# Patient Record
Sex: MALE | Race: BLACK OR AFRICAN AMERICAN | NOT HISPANIC OR LATINO | Employment: FULL TIME | URBAN - METROPOLITAN AREA
[De-identification: names, ages, dates, MRNs, and addresses within clinical notes are randomized per-mention and may not be internally consistent; named-entity substitution may affect disease eponyms.]

---

## 2020-05-21 DIAGNOSIS — E87.6 HYPOKALEMIA: Primary | ICD-10-CM

## 2020-05-21 DIAGNOSIS — E87.6 HYPOKALEMIA: ICD-10-CM

## 2020-05-21 RX ORDER — POTASSIUM CHLORIDE 1.5 G/1.77G
20 POWDER, FOR SOLUTION ORAL 2 TIMES DAILY
Qty: 60 TABLET | Refills: 1 | Status: SHIPPED | OUTPATIENT
Start: 2020-05-21 | End: 2020-05-21

## 2020-05-21 RX ORDER — POTASSIUM CHLORIDE 1500 MG/1
20 TABLET, FILM COATED, EXTENDED RELEASE ORAL 2 TIMES DAILY
Qty: 60 TABLET | Refills: 3 | Status: SHIPPED | OUTPATIENT
Start: 2020-05-21 | End: 2020-10-12

## 2020-10-10 DIAGNOSIS — E87.6 HYPOKALEMIA: ICD-10-CM

## 2020-10-12 RX ORDER — POTASSIUM CHLORIDE 1500 MG/1
20 TABLET, FILM COATED, EXTENDED RELEASE ORAL 2 TIMES DAILY
Qty: 60 TABLET | Refills: 3 | Status: SHIPPED | OUTPATIENT
Start: 2020-10-12 | End: 2021-04-20 | Stop reason: SDUPTHER

## 2020-10-16 ENCOUNTER — TELEPHONE (OUTPATIENT)
Dept: OTHER | Facility: OTHER | Age: 40
End: 2020-10-16

## 2020-10-16 DIAGNOSIS — E87.6 HYPOKALEMIA: Primary | ICD-10-CM

## 2020-10-19 RX ORDER — AMILORIDE HYDROCHLORIDE 5 MG/1
5 TABLET ORAL DAILY
Qty: 90 TABLET | Refills: 1 | Status: SHIPPED | OUTPATIENT
Start: 2020-10-19 | End: 2021-04-20 | Stop reason: SDUPTHER

## 2021-04-20 DIAGNOSIS — E87.6 HYPOKALEMIA: ICD-10-CM

## 2021-04-20 RX ORDER — POTASSIUM CHLORIDE 1500 MG/1
20 TABLET, FILM COATED, EXTENDED RELEASE ORAL 2 TIMES DAILY
Qty: 180 TABLET | Refills: 0 | Status: SHIPPED | OUTPATIENT
Start: 2021-04-20 | End: 2021-07-06

## 2021-04-20 RX ORDER — AMILORIDE HYDROCHLORIDE 5 MG/1
5 TABLET ORAL DAILY
Qty: 90 TABLET | Refills: 0 | Status: SHIPPED | OUTPATIENT
Start: 2021-04-20 | End: 2021-07-06

## 2021-04-22 ENCOUNTER — TELEPHONE (OUTPATIENT)
Dept: FAMILY MEDICINE CLINIC | Facility: CLINIC | Age: 41
End: 2021-04-22

## 2021-04-22 NOTE — TELEPHONE ENCOUNTER
Pt called and left msg stating wants to schedule an appt,, I returned call and had to leave msg to call back to reschedule an appt

## 2021-06-08 ENCOUNTER — HOSPITAL ENCOUNTER (EMERGENCY)
Facility: HOSPITAL | Age: 41
Discharge: HOME | End: 2021-06-08
Attending: EMERGENCY MEDICINE | Admitting: EMERGENCY MEDICINE
Payer: COMMERCIAL

## 2021-06-08 ENCOUNTER — APPOINTMENT (EMERGENCY)
Dept: RADIOLOGY | Facility: HOSPITAL | Age: 41
End: 2021-06-08
Payer: COMMERCIAL

## 2021-06-08 VITALS
TEMPERATURE: 97.7 F | OXYGEN SATURATION: 100 % | HEART RATE: 104 BPM | SYSTOLIC BLOOD PRESSURE: 170 MMHG | RESPIRATION RATE: 17 BRPM | DIASTOLIC BLOOD PRESSURE: 100 MMHG

## 2021-06-08 DIAGNOSIS — M65.4 DE QUERVAIN'S DISEASE (RADIAL STYLOID TENOSYNOVITIS): Primary | ICD-10-CM

## 2021-06-08 PROCEDURE — 73110 X-RAY EXAM OF WRIST: CPT

## 2021-06-08 PROCEDURE — 99283 EMERGENCY DEPT VISIT LOW MDM: CPT

## 2021-06-08 PROCEDURE — 99284 EMERGENCY DEPT VISIT MOD MDM: CPT | Performed by: EMERGENCY MEDICINE

## 2021-06-08 RX ORDER — IBUPROFEN 600 MG/1
600 TABLET ORAL ONCE
Status: COMPLETED | OUTPATIENT
Start: 2021-06-08 | End: 2021-06-08

## 2021-06-08 RX ADMIN — IBUPROFEN 600 MG: 600 TABLET, FILM COATED ORAL at 11:32

## 2021-06-08 NOTE — ED PROVIDER NOTES
History  Chief Complaint   Patient presents with    Wrist Pain     pt c/o left wrist pain x 1 week, he thinks he might of hurt it at work but unknown BETTY     18-year-old male presenting with persistent left wrist pain  He is right-hand dominant  He works as a  and has noticed worsening pain over the past 2 weeks  Patient believes this is work related but he cannot recall specific injury  He has not taken any medicine for the pain  Prior to Admission Medications   Prescriptions Last Dose Informant Patient Reported? Taking? AMILoride 5 mg tablet   No No   Sig: Take 1 tablet (5 mg total) by mouth daily   Potassium Chloride ER 20 MEQ TBCR   No No   Sig: Take 1 tablet (20 mEq total) by mouth 2 (two) times a day      Facility-Administered Medications: None       Past Medical History:   Diagnosis Date    Anxiety     Hypertension        Past Surgical History:   Procedure Laterality Date    CARDIAC CATHETERIZATION  05/31/2018       Family History   Problem Relation Age of Onset    Hypertension Mother     Hypertension Maternal Grandmother     Diabetes Neg Hx     Coronary artery disease Neg Hx      I have reviewed and agree with the history as documented  E-Cigarette/Vaping    E-Cigarette Use Never User      E-Cigarette/Vaping Substances     Social History     Tobacco Use    Smoking status: Never Smoker    Smokeless tobacco: Never Used   Substance Use Topics    Alcohol use: Never     Frequency: Never     Comment: none per minesh    Drug use: Never       Review of Systems   Constitutional: Negative for fever  HENT: Negative for congestion  Eyes: Negative for visual disturbance  Respiratory: Negative for shortness of breath  Cardiovascular: Negative for chest pain  Gastrointestinal: Negative for abdominal pain  Musculoskeletal:        Left wrist pain     Skin: Negative for rash  Neurological: Negative for weakness     Psychiatric/Behavioral: The patient is not nervous/anxious  Physical Exam  Physical Exam  Vitals signs and nursing note reviewed  Constitutional:       General: He is not in acute distress  HENT:      Head: Normocephalic and atraumatic  Mouth/Throat:      Pharynx: No posterior oropharyngeal erythema  Eyes:      Conjunctiva/sclera: Conjunctivae normal    Neck:      Musculoskeletal: No neck rigidity  Cardiovascular:      Rate and Rhythm: Regular rhythm  Pulmonary:      Effort: Pulmonary effort is normal  No respiratory distress  Abdominal:      General: There is no distension  Musculoskeletal:      Comments: No deformity  Pain localized along left thumb tendons, along distal radius  Pain exacerbated when patient holds thumb in a fist and tries to perform ulnar deviation   Skin:     General: Skin is warm and dry  Neurological:      Mental Status: He is alert  Mental status is at baseline  Psychiatric:         Mood and Affect: Mood normal          Vital Signs  ED Triage Vitals [06/08/21 1125]   Temperature Pulse Respirations Blood Pressure SpO2   97 7 °F (36 5 °C) 104 17 170/100 100 %      Temp Source Heart Rate Source Patient Position - Orthostatic VS BP Location FiO2 (%)   Oral Monitor Lying Left arm --      Pain Score       --           Vitals:    06/08/21 1125   BP: 170/100   Pulse: 104   Patient Position - Orthostatic VS: Lying         Visual Acuity      ED Medications  Medications   ibuprofen (MOTRIN) tablet 600 mg (600 mg Oral Given 6/8/21 1132)       Diagnostic Studies  Results Reviewed     None                 XR wrist 3+ views LEFT   ED Interpretation by Estuardo Cabrera DO (06/08 1154)   No fracture                   Procedures  Procedures         ED Course  ED Course as of Jun 08 1158   Tue Jun 08, 2021   1154 Xray neg for fracture                                              MDM  Number of Diagnoses or Management Options  Diagnosis management comments: 64-year-old male presenting with persistent left thumb/wrist pain  Pain is localized along palpation of some tendons  Patient is unable to hold some in the fist and perform ulnar deviation  Exam consistent with likely de Quervain tenosynovitis  We did discuss NSAIDs and wrist splint  Given the progressive symptoms, we discussed orthopedic follow-up if symptoms persist   Otherwise, we discussed f/u with workman's comp as this could be related to overuse  Disposition  Final diagnoses:   Emily Mines disease (radial styloid tenosynovitis)     Time reflects when diagnosis was documented in both MDM as applicable and the Disposition within this note     Time User Action Codes Description Comment    6/8/2021 11:38 AM William Nice Add [M65 4] Emily Mines disease (radial styloid tenosynovitis)       ED Disposition     ED Disposition Condition Date/Time Comment    Discharge Stable Tue Jun 8, 2021 11:38 AM Lyly Kang discharge to home/self care  Follow-up Information     Follow up With Specialties Details Why Contact Info    Awanda Lanes, MD Orthopedic Surgery, Hand Surgery Schedule an appointment as soon as possible for a visit in 1 week If symptoms worsen [address]            Patient's Medications   Discharge Prescriptions    No medications on file     No discharge procedures on file      PDMP Review     None          ED Provider  Electronically Signed by           Lynita Habermann, DO  06/08/21 4944

## 2021-06-08 NOTE — DISCHARGE INSTRUCTIONS
Please use the wrist splint for comfort  Please take Motrin for inflammation and pain  If symptoms persist, please call the orthopedist for evaluation

## 2021-06-08 NOTE — Clinical Note
Eli Mayorga was seen and treated in our emergency department on 6/8/2021             no use of left wrist    Diagnosis:     Mike    He may return on this date: 06/14/2021         If you have any questions or concerns, please don't hesitate to call        Dario Saunders, DO    ______________________________           _______________          _______________  Hospital Representative                              Date                                Time

## 2021-07-06 ENCOUNTER — OFFICE VISIT (OUTPATIENT)
Dept: FAMILY MEDICINE CLINIC | Facility: CLINIC | Age: 41
End: 2021-07-06

## 2021-07-06 VITALS
DIASTOLIC BLOOD PRESSURE: 100 MMHG | SYSTOLIC BLOOD PRESSURE: 160 MMHG | HEIGHT: 69 IN | OXYGEN SATURATION: 98 % | RESPIRATION RATE: 16 BRPM | TEMPERATURE: 97.7 F | WEIGHT: 155.6 LBS | BODY MASS INDEX: 23.05 KG/M2 | HEART RATE: 109 BPM

## 2021-07-06 DIAGNOSIS — E87.6 HYPOKALEMIA: ICD-10-CM

## 2021-07-06 DIAGNOSIS — I15.1 LIDDLE'S SYNDROME: Primary | ICD-10-CM

## 2021-07-06 PROCEDURE — 99213 OFFICE O/P EST LOW 20 MIN: CPT | Performed by: INTERNAL MEDICINE

## 2021-07-06 RX ORDER — AMILORIDE HYDROCHLORIDE 5 MG/1
10 TABLET ORAL DAILY
Qty: 90 TABLET | Refills: 0 | Status: SHIPPED | OUTPATIENT
Start: 2021-07-06 | End: 2021-08-11

## 2021-07-06 RX ORDER — CARVEDILOL 6.25 MG/1
6.25 TABLET ORAL 2 TIMES DAILY WITH MEALS
Qty: 180 TABLET | Refills: 3 | Status: SHIPPED | OUTPATIENT
Start: 2021-07-06 | End: 2022-03-01 | Stop reason: DRUGHIGH

## 2021-07-06 RX ORDER — AMILORIDE HYDROCHLORIDE 5 MG/1
5 TABLET ORAL DAILY
Qty: 90 TABLET | Refills: 0 | Status: CANCELLED | OUTPATIENT
Start: 2021-07-06

## 2021-07-06 RX ORDER — POTASSIUM CHLORIDE 1500 MG/1
20 TABLET, FILM COATED, EXTENDED RELEASE ORAL 2 TIMES DAILY
Qty: 180 TABLET | Refills: 0 | Status: SHIPPED | OUTPATIENT
Start: 2021-07-06 | End: 2022-04-25

## 2021-07-06 RX ORDER — CARVEDILOL 3.12 MG/1
3.12 TABLET ORAL 2 TIMES DAILY WITH MEALS
Qty: 180 TABLET | Refills: 3 | Status: CANCELLED | OUTPATIENT
Start: 2021-07-06

## 2021-07-06 NOTE — PROGRESS NOTES
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  He reports the blood pressure is not well controlled even between 128-708 systolic  He complains of lightheadedness and palpitations when the blood pressure is high  Sometimes he has blurry vision  Currently denies any of these symptoms  -recommended to go up on amiloride 10 mg daily, carvedilol 6 25 mg twice daily and potassium 20 mg twice a day  -follow with Nephrology next week  -do blood work CBC, BMP, magnesium, lipid panel and come back to the office in 1 week  Diagnoses and all orders for this visit:    Liddle's syndrome  -     Basic metabolic panel; Future  -     CBC and differential; Future  -     carvedilol (COREG) 6 25 mg tablet; Take 1 tablet (6 25 mg total) by mouth 2 (two) times a day with meals  -     Ambulatory referral to Nephrology; Future  -     Lipid panel; Future  -     Magnesium; Future    Hypokalemia  -     Potassium Chloride ER 20 MEQ TBCR; Take 1 tablet (20 mEq total) by mouth 2 (two) times a day  -     AMILoride 5 mg tablet; Take 2 tablets (10 mg total) by mouth daily    Other orders  -     Cancel: AMILoride 5 mg tablet; Take 1 tablet (5 mg total) by mouth daily  -     Cancel: carvedilol (Coreg) 3 125 mg tablet; Take 1 tablet (3 125 mg total) by mouth 2 (two) times a day with meals          Subjective:      Patient ID: Elvira Benites is a 35 y o  male  This is a 49-year-old male that comes today with a chief complaint of elevated blood pressure  He has Liddle syndrome diagnosed 2 years ago he follows with nephrology he needs to make an appointment in the next week  Patient reports being compliant with amiloride and potassium but some days his blood pressure goes as high as 180 and is associated with lightheadedness, nauseous, blurry vision and palpitations  He denies any medication side effects and he is not taking carvedilol he does not remember why    Denies having side effects with carvedilol in the past   Patient also reports muscle cramps sometimes tingling on the lips  Currently during evaluation patient denies any complains, no chest pain, shortness of breath, lightheadedness, palpitations, fever, abdominal pain, change in bowel movements or urinary habits  No smoking history only marijuana occasionally  Denies drinking alcohol or using any kind of IV drugs  Lives in a single family house with the wife and he is physically active  He follows a low-sodium diet and compliant with medications  He got the COVID vaccine otherwise no further screening at this point  Medication Refill  Pertinent negatives include no abdominal pain, arthralgias, chest pain, congestion, coughing, fatigue, fever, headaches, nausea, numbness, vomiting or weakness  Dizziness  Pertinent negatives include no abdominal pain, arthralgias, chest pain, congestion, coughing, fatigue, fever, headaches, nausea, numbness, vomiting or weakness  The following portions of the patient's history were reviewed and updated as appropriate: allergies, current medications, past family history, past medical history, past social history, past surgical history and problem list     Review of Systems   Constitutional: Negative for activity change, appetite change, fatigue and fever  HENT: Negative for congestion  Eyes: Negative for visual disturbance  Respiratory: Negative for cough, chest tightness and shortness of breath  Cardiovascular: Negative for chest pain, palpitations and leg swelling  Gastrointestinal: Negative for abdominal distention, abdominal pain, constipation, diarrhea, nausea and vomiting  Endocrine: Negative for polydipsia, polyphagia and polyuria  Genitourinary: Negative for dysuria  Musculoskeletal: Negative for arthralgias  Neurological: Negative for dizziness, facial asymmetry, weakness, numbness and headaches     Psychiatric/Behavioral: Negative for agitation, behavioral problems and confusion  Objective:      /100 (BP Location: Left arm, Patient Position: Sitting, Cuff Size: Standard)   Pulse (!) 109   Temp 97 7 °F (36 5 °C) (Temporal)   Resp 16   Ht 5' 9" (1 753 m)   Wt 70 6 kg (155 lb 9 6 oz)   SpO2 98%   BMI 22 98 kg/m²          Physical Exam  Constitutional:       General: He is not in acute distress  Appearance: Normal appearance  He is normal weight  HENT:      Head: Normocephalic and atraumatic  Eyes:      Extraocular Movements: Extraocular movements intact  Pupils: Pupils are equal, round, and reactive to light  Cardiovascular:      Rate and Rhythm: Normal rate and regular rhythm  Pulses: Normal pulses  Heart sounds: Normal heart sounds  No murmur heard  Pulmonary:      Effort: Pulmonary effort is normal  No respiratory distress  Breath sounds: Normal breath sounds  Abdominal:      General: Abdomen is flat  Bowel sounds are normal  There is no distension  Palpations: Abdomen is soft  Tenderness: There is no abdominal tenderness  There is no guarding  Musculoskeletal:         General: No swelling  Normal range of motion  Cervical back: Normal range of motion and neck supple  Right lower leg: No edema  Left lower leg: No edema  Skin:     General: Skin is dry  Capillary Refill: Capillary refill takes less than 2 seconds  Neurological:      General: No focal deficit present  Mental Status: He is alert and oriented to person, place, and time     Psychiatric:         Mood and Affect: Mood normal

## 2021-07-06 NOTE — ASSESSMENT & PLAN NOTE
-patient was diagnosed with this 2 years ago  He reports the blood pressure is not well controlled even between 210-094 systolic  He complains of lightheadedness and palpitations when the blood pressure is high  Sometimes he has blurry vision  Currently denies any of these symptoms  -recommended to go up on amiloride 10 mg daily, carvedilol 6 25 mg twice daily and potassium 20 mg twice a day  -follow with Nephrology next week  -do blood work CBC, BMP, magnesium, lipid panel and come back to the office in 1 week

## 2021-08-11 DIAGNOSIS — E87.6 HYPOKALEMIA: ICD-10-CM

## 2021-08-11 RX ORDER — AMILORIDE HYDROCHLORIDE 5 MG/1
TABLET ORAL
Qty: 90 TABLET | Refills: 0 | Status: SHIPPED | OUTPATIENT
Start: 2021-08-11 | End: 2021-09-13

## 2021-09-02 ENCOUNTER — TELEPHONE (OUTPATIENT)
Dept: NEPHROLOGY | Facility: CLINIC | Age: 41
End: 2021-09-02

## 2021-09-12 DIAGNOSIS — E87.6 HYPOKALEMIA: ICD-10-CM

## 2021-09-13 RX ORDER — AMILORIDE HYDROCHLORIDE 5 MG/1
TABLET ORAL
Qty: 60 TABLET | Refills: 1 | Status: SHIPPED | OUTPATIENT
Start: 2021-09-13 | End: 2021-10-07

## 2021-10-07 ENCOUNTER — OFFICE VISIT (OUTPATIENT)
Dept: FAMILY MEDICINE CLINIC | Facility: CLINIC | Age: 41
End: 2021-10-07
Payer: COMMERCIAL

## 2021-10-07 VITALS
DIASTOLIC BLOOD PRESSURE: 100 MMHG | SYSTOLIC BLOOD PRESSURE: 155 MMHG | WEIGHT: 155.4 LBS | HEART RATE: 132 BPM | TEMPERATURE: 97.5 F | OXYGEN SATURATION: 97 % | RESPIRATION RATE: 16 BRPM | HEIGHT: 69 IN | BODY MASS INDEX: 23.02 KG/M2

## 2021-10-07 DIAGNOSIS — I15.1 LIDDLE'S SYNDROME: Primary | ICD-10-CM

## 2021-10-07 DIAGNOSIS — G50.0 TRIGEMINAL NEURALGIA: ICD-10-CM

## 2021-10-07 DIAGNOSIS — E87.6 HYPOKALEMIA: ICD-10-CM

## 2021-10-07 PROCEDURE — 99214 OFFICE O/P EST MOD 30 MIN: CPT | Performed by: INTERNAL MEDICINE

## 2021-10-07 RX ORDER — AMILORIDE HYDROCHLORIDE 5 MG/1
20 TABLET ORAL DAILY
Qty: 60 TABLET | Refills: 1 | Status: SHIPPED | OUTPATIENT
Start: 2021-10-07 | End: 2021-12-16 | Stop reason: CLARIF

## 2021-10-15 ENCOUNTER — TELEPHONE (OUTPATIENT)
Dept: NEPHROLOGY | Facility: CLINIC | Age: 41
End: 2021-10-15

## 2021-11-17 LAB
BASOPHILS # BLD AUTO: 80 CELLS/UL (ref 0–200)
BASOPHILS NFR BLD AUTO: 1 %
BUN SERPL-MCNC: 15 MG/DL (ref 7–25)
BUN/CREAT SERPL: NORMAL (CALC) (ref 6–22)
CALCIUM SERPL-MCNC: 9.6 MG/DL (ref 8.6–10.3)
CHLORIDE SERPL-SCNC: 105 MMOL/L (ref 98–110)
CHOLEST SERPL-MCNC: 196 MG/DL
CHOLEST/HDLC SERPL: 3.8 (CALC)
CO2 SERPL-SCNC: 30 MMOL/L (ref 20–32)
CREAT SERPL-MCNC: 1.13 MG/DL (ref 0.6–1.35)
EOSINOPHIL # BLD AUTO: 520 CELLS/UL (ref 15–500)
EOSINOPHIL NFR BLD AUTO: 6.5 %
ERYTHROCYTE [DISTWIDTH] IN BLOOD BY AUTOMATED COUNT: 12.5 % (ref 11–15)
GLUCOSE SERPL-MCNC: 99 MG/DL (ref 65–99)
HCT VFR BLD AUTO: 44.4 % (ref 38.5–50)
HDLC SERPL-MCNC: 52 MG/DL
HGB BLD-MCNC: 15.1 G/DL (ref 13.2–17.1)
LDLC SERPL CALC-MCNC: 125 MG/DL (CALC)
LYMPHOCYTES # BLD AUTO: 1368 CELLS/UL (ref 850–3900)
LYMPHOCYTES NFR BLD AUTO: 17.1 %
MAGNESIUM SERPL-MCNC: 2 MG/DL (ref 1.5–2.5)
MCH RBC QN AUTO: 29 PG (ref 27–33)
MCHC RBC AUTO-ENTMCNC: 34 G/DL (ref 32–36)
MCV RBC AUTO: 85.4 FL (ref 80–100)
MONOCYTES # BLD AUTO: 744 CELLS/UL (ref 200–950)
MONOCYTES NFR BLD AUTO: 9.3 %
NEUTROPHILS # BLD AUTO: 5288 CELLS/UL (ref 1500–7800)
NEUTROPHILS NFR BLD AUTO: 66.1 %
NONHDLC SERPL-MCNC: 144 MG/DL (CALC)
PLATELET # BLD AUTO: 254 THOUSAND/UL (ref 140–400)
PMV BLD REES-ECKER: 9.7 FL (ref 7.5–12.5)
POTASSIUM SERPL-SCNC: 3.9 MMOL/L (ref 3.5–5.3)
RBC # BLD AUTO: 5.2 MILLION/UL (ref 4.2–5.8)
SL AMB EGFR AFRICAN AMERICAN: 92 ML/MIN/1.73M2
SL AMB EGFR NON AFRICAN AMERICAN: 79 ML/MIN/1.73M2
SODIUM SERPL-SCNC: 141 MMOL/L (ref 135–146)
TRIGL SERPL-MCNC: 86 MG/DL
WBC # BLD AUTO: 8 THOUSAND/UL (ref 3.8–10.8)

## 2021-11-19 ENCOUNTER — TELEPHONE (OUTPATIENT)
Dept: FAMILY MEDICINE CLINIC | Facility: CLINIC | Age: 41
End: 2021-11-19

## 2021-11-19 ENCOUNTER — OFFICE VISIT (OUTPATIENT)
Dept: FAMILY MEDICINE CLINIC | Facility: CLINIC | Age: 41
End: 2021-11-19
Payer: COMMERCIAL

## 2021-11-19 VITALS
HEIGHT: 69 IN | DIASTOLIC BLOOD PRESSURE: 80 MMHG | HEART RATE: 88 BPM | TEMPERATURE: 97.6 F | BODY MASS INDEX: 23.64 KG/M2 | RESPIRATION RATE: 16 BRPM | WEIGHT: 159.6 LBS | SYSTOLIC BLOOD PRESSURE: 140 MMHG | OXYGEN SATURATION: 98 %

## 2021-11-19 DIAGNOSIS — Z00.00 ANNUAL PHYSICAL EXAM: Primary | ICD-10-CM

## 2021-11-19 DIAGNOSIS — I10 PRIMARY HYPERTENSION: ICD-10-CM

## 2021-11-19 DIAGNOSIS — I15.1 LIDDLE'S SYNDROME: ICD-10-CM

## 2021-11-19 PROCEDURE — 99396 PREV VISIT EST AGE 40-64: CPT | Performed by: INTERNAL MEDICINE

## 2021-12-16 ENCOUNTER — CONSULT (OUTPATIENT)
Dept: NEPHROLOGY | Facility: CLINIC | Age: 41
End: 2021-12-16
Payer: COMMERCIAL

## 2021-12-16 VITALS
BODY MASS INDEX: 23.4 KG/M2 | WEIGHT: 158 LBS | HEART RATE: 80 BPM | DIASTOLIC BLOOD PRESSURE: 90 MMHG | SYSTOLIC BLOOD PRESSURE: 150 MMHG | HEIGHT: 69 IN

## 2021-12-16 DIAGNOSIS — I10 PRIMARY HYPERTENSION: ICD-10-CM

## 2021-12-16 DIAGNOSIS — I15.1 LIDDLE'S SYNDROME: ICD-10-CM

## 2021-12-16 DIAGNOSIS — I10 HYPERTENSION, UNSPECIFIED TYPE: Primary | ICD-10-CM

## 2021-12-16 LAB
SL AMB  POCT GLUCOSE, UA: NORMAL
SL AMB LEUKOCYTE ESTERASE,UA: NORMAL
SL AMB POCT BILIRUBIN,UA: NORMAL
SL AMB POCT BLOOD,UA: NORMAL
SL AMB POCT KETONES,UA: NORMAL
SL AMB POCT NITRITE,UA: NORMAL
SL AMB POCT PH,UA: 6.5
SL AMB POCT SPECIFIC GRAVITY,UA: 1.02
SL AMB POCT URINE PROTEIN: NORMAL
SL AMB POCT UROBILINOGEN: 0.2

## 2021-12-16 PROCEDURE — 99244 OFF/OP CNSLTJ NEW/EST MOD 40: CPT | Performed by: INTERNAL MEDICINE

## 2021-12-16 PROCEDURE — 81002 URINALYSIS NONAUTO W/O SCOPE: CPT | Performed by: INTERNAL MEDICINE

## 2021-12-16 RX ORDER — AMILORIDE HYDROCHLORIDE 5 MG/1
10 TABLET ORAL 2 TIMES DAILY
COMMUNITY
End: 2022-03-24 | Stop reason: SDUPTHER

## 2021-12-16 RX ORDER — NIFEDIPINE 30 MG/1
30 TABLET, EXTENDED RELEASE ORAL DAILY
Qty: 30 TABLET | Refills: 5 | Status: SHIPPED | OUTPATIENT
Start: 2021-12-16 | End: 2022-02-23 | Stop reason: ALTCHOICE

## 2022-01-10 ENCOUNTER — TELEPHONE (OUTPATIENT)
Dept: NEPHROLOGY | Facility: CLINIC | Age: 42
End: 2022-01-10

## 2022-02-23 ENCOUNTER — OFFICE VISIT (OUTPATIENT)
Dept: FAMILY MEDICINE CLINIC | Facility: CLINIC | Age: 42
End: 2022-02-23
Payer: COMMERCIAL

## 2022-02-23 VITALS
SYSTOLIC BLOOD PRESSURE: 144 MMHG | BODY MASS INDEX: 23.7 KG/M2 | TEMPERATURE: 97.1 F | RESPIRATION RATE: 16 BRPM | DIASTOLIC BLOOD PRESSURE: 92 MMHG | OXYGEN SATURATION: 98 % | HEIGHT: 69 IN | WEIGHT: 160 LBS | HEART RATE: 97 BPM

## 2022-02-23 DIAGNOSIS — G50.0 TRIGEMINAL NEURALGIA: ICD-10-CM

## 2022-02-23 DIAGNOSIS — I15.1 LIDDLE'S SYNDROME: Primary | ICD-10-CM

## 2022-02-23 PROCEDURE — 99214 OFFICE O/P EST MOD 30 MIN: CPT | Performed by: INTERNAL MEDICINE

## 2022-02-23 NOTE — ASSESSMENT & PLAN NOTE
Has been  better since up amiloride to 20 mg od  Has intolerance to nefedipine- stopped taking it  Also on coreg  Home blood pressure is fluctuating  Most of time it is around 130/84  Check bp home and call me with reading  Advised patient to follow-up with his nephrologist regularly    Check bmp- pt has a script

## 2022-02-23 NOTE — PROGRESS NOTES
Assessment/Plan:         Problem List Items Addressed This Visit        Endocrine    Liddle's syndrome - Primary     Has been  better since up amiloride to 20 mg od  Has intolerance to nefedipine- stopped taking it  Also on coreg  Home blood pressure is fluctuating  Most of time it is around 130/84  Check bp home and call me with reading  Advised patient to follow-up with his nephrologist regularly  Nervous and Auditory    Trigeminal neuralgia     Now gotten better itself  monitor                 Subjective:      Patient ID: Nell Liao is a 35 y o  male  1  htn- liddle's syndrome  He stopped taking nifedipine because it was giving him more headache  He is taking other medicines regularly  He denies any lightheadedness or dizziness  No chest pain or dyspnea  No palpitations or syncope  No edema  No cough  No muscle cramps or weakness  No other new complaint  No orthopnea  The following portions of the patient's history were reviewed and updated as appropriate:   Past Medical History:  He has a past medical history of Anxiety, Hypertension, and Liddle's syndrome  ,  _______________________________________________________________________  Medical Problems:  does not have any pertinent problems on file ,  _______________________________________________________________________  Past Surgical History:   has a past surgical history that includes Cardiac catheterization (05/31/2018)  ,  _______________________________________________________________________  Family History:  family history includes Hypertension in his maternal grandmother and mother ,  _______________________________________________________________________  Social History:   reports that he has never smoked  He has never used smokeless tobacco  He reports that he does not drink alcohol and does not use drugs  ,  _______________________________________________________________________  Allergies:  has No Known Allergies     _______________________________________________________________________  Current Outpatient Medications   Medication Sig Dispense Refill    AMILoride 5 mg tablet Take 10 mg by mouth 2 (two) times a day      ASHWAGANDHA PO Take by mouth daily      carvedilol (COREG) 6 25 mg tablet Take 1 tablet (6 25 mg total) by mouth 2 (two) times a day with meals 180 tablet 3    Potassium Chloride ER 20 MEQ TBCR Take 1 tablet (20 mEq total) by mouth 2 (two) times a day 180 tablet 0     No current facility-administered medications for this visit      _______________________________________________________________________  Review of Systems   Constitutional: Negative for chills, fatigue and fever  HENT: Negative for congestion, nosebleeds and rhinorrhea  Eyes: Negative for discharge and visual disturbance  Respiratory: Negative for cough, chest tightness, shortness of breath and wheezing  Cardiovascular: Negative for chest pain  Gastrointestinal: Negative for abdominal distention, abdominal pain, constipation, diarrhea and vomiting  Endocrine: Negative for polydipsia and polyuria  Genitourinary: Negative for difficulty urinating, frequency and hematuria  Musculoskeletal: Negative for arthralgias, back pain and myalgias  Skin: Negative for rash  Allergic/Immunologic: Negative for environmental allergies  Neurological: Negative for dizziness, syncope, weakness, light-headedness and headaches  Hematological: Negative  Psychiatric/Behavioral: Negative for agitation, confusion, decreased concentration and dysphoric mood  The patient is not nervous/anxious  All other systems reviewed and are negative          Objective:  Vitals:    02/23/22 1609 02/23/22 1627   BP: 130/90 144/92   BP Location: Left arm    Patient Position: Sitting    Cuff Size: Large    Pulse: 97    Resp: 16    Temp: (!) 97 1 °F (36 2 °C)    TempSrc: Temporal    SpO2: 98%    Weight: 72 6 kg (160 lb)    Height: 5' 9" (7 756 m)      Body mass index is 23 63 kg/m²  Physical Exam  Vitals and nursing note reviewed  Constitutional:       General: He is not in acute distress  Appearance: Normal appearance  He is well-developed  HENT:      Head: Normocephalic and atraumatic  Eyes:      General:         Right eye: No discharge  Left eye: No discharge  Neck:      Thyroid: No thyromegaly  Cardiovascular:      Rate and Rhythm: Normal rate and regular rhythm  Pulses: Normal pulses  Heart sounds: Normal heart sounds  No murmur heard  Pulmonary:      Effort: Pulmonary effort is normal       Breath sounds: Normal breath sounds  No wheezing or rhonchi  Musculoskeletal:         General: No swelling or tenderness  Cervical back: Neck supple  Right lower leg: No edema  Left lower leg: No edema  Lymphadenopathy:      Cervical: No cervical adenopathy  Skin:     General: Skin is warm  Capillary Refill: Capillary refill takes less than 2 seconds  Neurological:      Mental Status: He is alert and oriented to person, place, and time  Psychiatric:         Mood and Affect: Mood normal          Behavior: Behavior normal          Thought Content:  Thought content normal

## 2022-02-25 ENCOUNTER — TELEPHONE (OUTPATIENT)
Dept: FAMILY MEDICINE CLINIC | Facility: CLINIC | Age: 42
End: 2022-02-25

## 2022-02-25 NOTE — TELEPHONE ENCOUNTER
Patient's wife called in to state that he took his BP readings on Wednesday after he saw you on 2/23    160/100  140/90  137/88    These were the only three readings he took and that day, roughly a few minutes a part  If needed you can contact them      Ольга Patel

## 2022-03-01 DIAGNOSIS — I10 PRIMARY HYPERTENSION: Primary | ICD-10-CM

## 2022-03-01 RX ORDER — CARVEDILOL 12.5 MG/1
12.5 TABLET ORAL 2 TIMES DAILY WITH MEALS
Qty: 60 TABLET | Refills: 5 | Status: SHIPPED | OUTPATIENT
Start: 2022-03-01 | End: 2022-06-27

## 2022-03-01 NOTE — TELEPHONE ENCOUNTER
Called and spoke to Rip Natacha and states only took a reading 2/28/2022 and was 145/90 at 5:30 pm  Would like to know next step?   Collin Hogan

## 2022-03-02 NOTE — TELEPHONE ENCOUNTER
Called and spoke to Tay Salinas informed to increase Carvedilol 12 5 mg BID, informed new script at pharmacy     Colleen August

## 2022-03-24 DIAGNOSIS — I10 PRIMARY HYPERTENSION: Primary | ICD-10-CM

## 2022-03-24 RX ORDER — AMILORIDE HYDROCHLORIDE 5 MG/1
10 TABLET ORAL 2 TIMES DAILY
Qty: 180 TABLET | Refills: 2 | Status: SHIPPED | OUTPATIENT
Start: 2022-03-24 | End: 2022-08-01

## 2022-04-04 ENCOUNTER — TELEPHONE (OUTPATIENT)
Dept: NEPHROLOGY | Facility: CLINIC | Age: 42
End: 2022-04-04

## 2022-04-04 ENCOUNTER — APPOINTMENT (OUTPATIENT)
Dept: LAB | Facility: HOSPITAL | Age: 42
End: 2022-04-04
Payer: COMMERCIAL

## 2022-04-04 DIAGNOSIS — I15.1 LIDDLE'S SYNDROME: ICD-10-CM

## 2022-04-04 LAB
ANION GAP SERPL CALCULATED.3IONS-SCNC: 9 MMOL/L (ref 4–13)
BASOPHILS # BLD AUTO: 0.06 THOUSANDS/ΜL (ref 0–0.1)
BASOPHILS NFR BLD AUTO: 1 % (ref 0–1)
BUN SERPL-MCNC: 13 MG/DL (ref 5–25)
CALCIUM SERPL-MCNC: 9.4 MG/DL (ref 8.4–10.2)
CHLORIDE SERPL-SCNC: 102 MMOL/L (ref 96–108)
CHOLEST SERPL-MCNC: 194 MG/DL
CO2 SERPL-SCNC: 29 MMOL/L (ref 21–32)
CREAT SERPL-MCNC: 1.13 MG/DL (ref 0.6–1.3)
EOSINOPHIL # BLD AUTO: 0.5 THOUSAND/ΜL (ref 0–0.61)
EOSINOPHIL NFR BLD AUTO: 6 % (ref 0–6)
ERYTHROCYTE [DISTWIDTH] IN BLOOD BY AUTOMATED COUNT: 13.2 % (ref 11.6–15.1)
GFR SERPL CREATININE-BSD FRML MDRD: 78 ML/MIN/1.73SQ M
GLUCOSE SERPL-MCNC: 94 MG/DL (ref 65–140)
HCT VFR BLD AUTO: 43.2 % (ref 36.5–49.3)
HDLC SERPL-MCNC: 44 MG/DL
HGB BLD-MCNC: 14.4 G/DL (ref 12–17)
IMM GRANULOCYTES # BLD AUTO: 0.05 THOUSAND/UL (ref 0–0.2)
IMM GRANULOCYTES NFR BLD AUTO: 1 % (ref 0–2)
LDLC SERPL CALC-MCNC: 126 MG/DL (ref 0–100)
LYMPHOCYTES # BLD AUTO: 1.81 THOUSANDS/ΜL (ref 0.6–4.47)
LYMPHOCYTES NFR BLD AUTO: 22 % (ref 14–44)
MAGNESIUM SERPL-MCNC: 1.9 MG/DL (ref 1.9–2.7)
MCH RBC QN AUTO: 29.4 PG (ref 26.8–34.3)
MCHC RBC AUTO-ENTMCNC: 33.3 G/DL (ref 31.4–37.4)
MCV RBC AUTO: 88 FL (ref 82–98)
MONOCYTES # BLD AUTO: 0.71 THOUSAND/ΜL (ref 0.17–1.22)
MONOCYTES NFR BLD AUTO: 9 % (ref 4–12)
NEUTROPHILS # BLD AUTO: 4.95 THOUSANDS/ΜL (ref 1.85–7.62)
NEUTS SEG NFR BLD AUTO: 61 % (ref 43–75)
NONHDLC SERPL-MCNC: 150 MG/DL
NRBC BLD AUTO-RTO: 0 /100 WBCS
PLATELET # BLD AUTO: 239 THOUSANDS/UL (ref 149–390)
PMV BLD AUTO: 9.5 FL (ref 8.9–12.7)
POTASSIUM SERPL-SCNC: 3.7 MMOL/L (ref 3.5–5.3)
RBC # BLD AUTO: 4.89 MILLION/UL (ref 3.88–5.62)
SODIUM SERPL-SCNC: 140 MMOL/L (ref 135–147)
TRIGL SERPL-MCNC: 121 MG/DL
WBC # BLD AUTO: 8.08 THOUSAND/UL (ref 4.31–10.16)

## 2022-04-04 PROCEDURE — 83735 ASSAY OF MAGNESIUM: CPT

## 2022-04-04 PROCEDURE — 85025 COMPLETE CBC W/AUTO DIFF WBC: CPT

## 2022-04-04 PROCEDURE — 36415 COLL VENOUS BLD VENIPUNCTURE: CPT

## 2022-04-04 PROCEDURE — 80048 BASIC METABOLIC PNL TOTAL CA: CPT

## 2022-04-04 PROCEDURE — 80061 LIPID PANEL: CPT

## 2022-04-04 NOTE — TELEPHONE ENCOUNTER
Appointment Confirmation   Person confirmed appointment with  If not patient, name of the person LM 4/4    Date and time of appointment 4/5  8:30    Patient acknowledged and will be at appointment? no    Did you advise the patient that they will need a urine sample if they are a new patient?  N/A    Did you advise the patient to bring their current medications for verification? (including any OTC) Yes    Additional Information

## 2022-04-05 ENCOUNTER — TELEPHONE (OUTPATIENT)
Dept: NEPHROLOGY | Facility: CLINIC | Age: 42
End: 2022-04-05

## 2022-04-06 ENCOUNTER — TELEPHONE (OUTPATIENT)
Dept: NEPHROLOGY | Facility: CLINIC | Age: 42
End: 2022-04-06

## 2022-04-22 DIAGNOSIS — E87.6 HYPOKALEMIA: ICD-10-CM

## 2022-04-25 RX ORDER — POTASSIUM CHLORIDE 1500 MG/1
TABLET, FILM COATED, EXTENDED RELEASE ORAL
Qty: 60 TABLET | Refills: 2 | Status: SHIPPED | OUTPATIENT
Start: 2022-04-25 | End: 2022-06-24

## 2022-05-11 ENCOUNTER — TELEPHONE (OUTPATIENT)
Dept: NEPHROLOGY | Facility: CLINIC | Age: 42
End: 2022-05-11

## 2022-05-24 ENCOUNTER — TELEPHONE (OUTPATIENT)
Dept: NEPHROLOGY | Facility: CLINIC | Age: 42
End: 2022-05-24

## 2022-05-25 ENCOUNTER — TELEPHONE (OUTPATIENT)
Dept: NEPHROLOGY | Facility: CLINIC | Age: 42
End: 2022-05-25

## 2022-06-24 DIAGNOSIS — E87.6 HYPOKALEMIA: ICD-10-CM

## 2022-06-24 RX ORDER — POTASSIUM CHLORIDE 1500 MG/1
TABLET, FILM COATED, EXTENDED RELEASE ORAL
Qty: 180 TABLET | Refills: 1 | Status: SHIPPED | OUTPATIENT
Start: 2022-06-24

## 2022-06-25 DIAGNOSIS — I10 PRIMARY HYPERTENSION: ICD-10-CM

## 2022-06-27 RX ORDER — CARVEDILOL 12.5 MG/1
TABLET ORAL
Qty: 180 TABLET | Refills: 2 | Status: SHIPPED | OUTPATIENT
Start: 2022-06-27

## 2022-07-14 ENCOUNTER — OFFICE VISIT (OUTPATIENT)
Dept: FAMILY MEDICINE CLINIC | Facility: CLINIC | Age: 42
End: 2022-07-14
Payer: COMMERCIAL

## 2022-07-14 VITALS
HEIGHT: 69 IN | WEIGHT: 157.6 LBS | HEART RATE: 81 BPM | RESPIRATION RATE: 16 BRPM | BODY MASS INDEX: 23.34 KG/M2 | TEMPERATURE: 96.9 F | OXYGEN SATURATION: 96 % | DIASTOLIC BLOOD PRESSURE: 80 MMHG | SYSTOLIC BLOOD PRESSURE: 120 MMHG

## 2022-07-14 DIAGNOSIS — I15.1 LIDDLE'S SYNDROME: Primary | ICD-10-CM

## 2022-07-14 DIAGNOSIS — G50.0 TRIGEMINAL NEURALGIA: ICD-10-CM

## 2022-07-14 DIAGNOSIS — I15.8 OTHER SECONDARY HYPERTENSION: ICD-10-CM

## 2022-07-14 PROCEDURE — 99214 OFFICE O/P EST MOD 30 MIN: CPT | Performed by: INTERNAL MEDICINE

## 2022-07-14 NOTE — PROGRESS NOTES
Assessment/Plan:         Problem List Items Addressed This Visit        Endocrine    Liddle's syndrome - Primary       Cardiovascular and Mediastinum    Other secondary hypertension    Relevant Orders    Basic metabolic panel       Nervous and Auditory    Trigeminal neuralgia     Recent flare up for past 4 days  Today was one episode this morning- lasted 2-3 seconds  Refer to neruologist  Does teeth grinding sometimes  - try otc mouth guard           Relevant Orders    Ambulatory Referral to Neurology            Subjective:      Patient ID: Lane Light is a 38 y o  male  1  htn- home blood pressure is ranging from 110-140/80  Tolerating medicine well  No lightheadedness or syncope  No tinnitus  No chest pain or dyspnea  No palpitations  No edema  No orthopnea  2  Pain left- face- he get  Santi pain on the left side of the face mainly in the lower jaw area  It feels it deep sharp pain in the mandible area  He does not have molar teeth on that side  No swelling inside the oral cavity  No ulcers  The pain sensations last few seconds and then disappears  No aggravating factor  Sometime he does teeth grinding at night      The following portions of the patient's history were reviewed and updated as appropriate:   Past Medical History:  He has a past medical history of Anxiety, Hypertension, and Liddle's syndrome  ,  _______________________________________________________________________  Medical Problems:  does not have any pertinent problems on file ,  _______________________________________________________________________  Past Surgical History:   has a past surgical history that includes Cardiac catheterization (05/31/2018)  ,  _______________________________________________________________________  Family History:  family history includes Hypertension in his maternal grandmother and mother ,  _______________________________________________________________________  Social History:   reports that he has never smoked  He has never used smokeless tobacco  He reports that he does not drink alcohol and does not use drugs  ,  _______________________________________________________________________  Allergies:  has No Known Allergies     _______________________________________________________________________  Current Outpatient Medications   Medication Sig Dispense Refill    AMILoride 5 mg tablet Take 2 tablets (10 mg total) by mouth 2 (two) times a day PATIENT TAKES ONE TABLET  tablet 2    ASHWAGANDHA PO Take by mouth daily      carvedilol (COREG) 12 5 mg tablet TAKE 1 TABLET BY MOUTH TWICE A DAY WITH FOOD 180 tablet 2    Potassium Chloride ER 20 MEQ TBCR TAKE 1 TABLET BY MOUTH TWICE A  tablet 1     No current facility-administered medications for this visit      _______________________________________________________________________  Review of Systems   Constitutional: Negative for chills, fatigue and fever  HENT: Negative for congestion, nosebleeds and rhinorrhea  Eyes: Negative for discharge and visual disturbance  Respiratory: Negative for cough, chest tightness, shortness of breath and wheezing  Cardiovascular: Negative for chest pain and leg swelling  Gastrointestinal: Negative for abdominal distention, abdominal pain, constipation, diarrhea and vomiting  Endocrine: Negative for polydipsia and polyuria  Genitourinary: Negative for difficulty urinating, frequency and hematuria  Musculoskeletal: Negative for arthralgias, back pain and myalgias  Skin: Negative for rash  Allergic/Immunologic: Negative for environmental allergies  Neurological: Negative for dizziness, syncope, weakness, light-headedness and headaches  Hematological: Negative  Psychiatric/Behavioral: Negative for agitation, confusion, decreased concentration, dysphoric mood and suicidal ideas  The patient is not nervous/anxious  All other systems reviewed and are negative          Objective:  Vitals: 07/14/22 1645   BP: 150/90   BP Location: Left arm   Patient Position: Sitting   Cuff Size: Large   Pulse: 81   Resp: 16   Temp: (!) 96 9 °F (36 1 °C)   TempSrc: Temporal   SpO2: 96%   Weight: 71 5 kg (157 lb 9 6 oz)   Height: 5' 9" (1 753 m)     Body mass index is 23 27 kg/m²  Physical Exam  Vitals and nursing note reviewed  Constitutional:       General: He is not in acute distress  Appearance: Normal appearance  He is well-developed  HENT:      Head: Normocephalic and atraumatic  Mouth/Throat:      Mouth: Mucous membranes are moist       Pharynx: No oropharyngeal exudate or posterior oropharyngeal erythema  Eyes:      General:         Right eye: No discharge  Left eye: No discharge  Neck:      Thyroid: No thyromegaly  Cardiovascular:      Rate and Rhythm: Normal rate and regular rhythm  Pulses: Normal pulses  Heart sounds: Normal heart sounds  No murmur heard  Pulmonary:      Effort: Pulmonary effort is normal       Breath sounds: Normal breath sounds  No wheezing or rhonchi  Abdominal:      General: Bowel sounds are normal  There is no distension  Palpations: Abdomen is soft  Tenderness: There is no abdominal tenderness  Musculoskeletal:         General: Tenderness (left jaw area around root of molar  ) present  No swelling  Cervical back: Neck supple  Right lower leg: No edema  Left lower leg: No edema  Lymphadenopathy:      Cervical: No cervical adenopathy  Skin:     General: Skin is warm  Findings: No erythema  Neurological:      Mental Status: He is alert and oriented to person, place, and time  Cranial Nerves: No cranial nerve deficit  Sensory: No sensory deficit  Motor: No weakness  Psychiatric:         Mood and Affect: Mood normal          Behavior: Behavior normal          Thought Content:  Thought content normal

## 2022-07-14 NOTE — ASSESSMENT & PLAN NOTE
Recent pain for past 4 days  Tender spot on left jaw area  Today was one episode this morning- lasted 2-3 seconds  Refer to his dentist- pt to call his dentist  Does teeth grinding sometimes  - try otc mouth guard
Tolerating medicine well  Home blood pressure ranging from 110 to 140  Also seeing nephrologist regularly  He was given nifedipine which he stopped due to intolerance with lightheadedness    Check BMP
Yes

## 2022-08-01 ENCOUNTER — TELEPHONE (OUTPATIENT)
Dept: NEPHROLOGY | Facility: CLINIC | Age: 42
End: 2022-08-01

## 2022-08-01 ENCOUNTER — HOSPITAL ENCOUNTER (EMERGENCY)
Facility: HOSPITAL | Age: 42
Discharge: HOME | End: 2022-08-01
Attending: EMERGENCY MEDICINE
Payer: COMMERCIAL

## 2022-08-01 VITALS
SYSTOLIC BLOOD PRESSURE: 160 MMHG | OXYGEN SATURATION: 100 % | HEART RATE: 85 BPM | WEIGHT: 157 LBS | DIASTOLIC BLOOD PRESSURE: 116 MMHG | RESPIRATION RATE: 18 BRPM | BODY MASS INDEX: 23.18 KG/M2 | TEMPERATURE: 98.1 F

## 2022-08-01 DIAGNOSIS — M54.6 ACUTE LEFT-SIDED THORACIC BACK PAIN: Primary | ICD-10-CM

## 2022-08-01 PROCEDURE — 99283 EMERGENCY DEPT VISIT LOW MDM: CPT

## 2022-08-01 PROCEDURE — 99282 EMERGENCY DEPT VISIT SF MDM: CPT | Performed by: EMERGENCY MEDICINE

## 2022-08-01 NOTE — DISCHARGE INSTRUCTIONS
Take over-the-counter ibuprofen as needed for pain    Return to the ER if you develop chest pain, shortness of breath, fever, or any other new/worse symptoms

## 2022-08-01 NOTE — Clinical Note
Binta Estevez was seen and treated in our emergency department on 8/1/2022  Diagnosis:     Mike  may return to work on return date  He may return on this date: 08/02/2022    Please excuse from work on 8/1/2022  If you have any questions or concerns, please don't hesitate to call        Dario Bianchi MD    ______________________________           _______________          _______________  Hospital Representative                              Date                                Time
Mau Engel was seen and treated in our emergency department on 8/1/2022  Diagnosis:     Mike  may return to work on return date  He may return on this date: 08/02/2022    Please excuse from work on 8/1/2022  If you have any questions or concerns, please don't hesitate to call        Kathy España MD    ______________________________           _______________          _______________  Hospital Representative                              Date                                Time
No

## 2022-08-01 NOTE — TELEPHONE ENCOUNTER
Called pt to see if he was interested in 8/2 opening at 3 with Antonette Jonesmail was not set up to leave a message

## 2022-08-02 ENCOUNTER — TELEPHONE (OUTPATIENT)
Dept: PHYSICAL THERAPY | Facility: OTHER | Age: 42
End: 2022-08-02

## 2022-08-02 NOTE — ED PROVIDER NOTES
History  Chief Complaint   Patient presents with    Back Pain     Woke up this morning with shooting pain in back radiating up towards neck and could not look up or down     The patient reports he woke up this morning with a stiff neck and sore back and neck  He reports he could not touch his chin to his chest or turn his head more than a few degrees left or right  This gradually improved throughout the day and now he is able to move his neck normally but he still has pain with the same neck movements  He denies any radiation anywhere including to his arms or back  He denies any tingling or numbness  The pain is most intense in his upper back slightly to the left  It is not tearing  It feels sore  Patient denies any pain yesterday  There is no pleuritic component and there is no chest pain  No leg swelling or difficulty breathing  Patient reports that he gradually improve with time but still wanted to get checked out because of his age  Back Pain      Prior to Admission Medications   Prescriptions Last Dose Informant Patient Reported? Taking?    AMILoride 5 mg tablet 7/31/2022 at Unknown time  No Yes   Sig: Take 2 tablets (10 mg total) by mouth 2 (two) times a day PATIENT TAKES ONE TABLET BID   ASHWAGANDHA PO 8/1/2022 at Unknown time Self Yes Yes   Sig: Take by mouth daily   Potassium Chloride ER 20 MEQ TBCR 8/1/2022 at Unknown time  No Yes   Sig: TAKE 1 TABLET BY MOUTH TWICE A DAY   carvedilol (COREG) 12 5 mg tablet 8/1/2022 at Unknown time  No Yes   Sig: TAKE 1 TABLET BY MOUTH TWICE A DAY WITH FOOD      Facility-Administered Medications: None       Past Medical History:   Diagnosis Date    Anxiety     Hypertension     Liddle's syndrome     diagnosed 2 years ago       Past Surgical History:   Procedure Laterality Date    CARDIAC CATHETERIZATION  05/31/2018       Family History   Problem Relation Age of Onset    Hypertension Mother     Hypertension Maternal Grandmother     Diabetes Neg Hx     Coronary artery disease Neg Hx      I have reviewed and agree with the history as documented  E-Cigarette/Vaping    E-Cigarette Use Never User      E-Cigarette/Vaping Substances    Nicotine No     THC No     CBD No     Flavoring No      Social History     Tobacco Use    Smoking status: Never Smoker    Smokeless tobacco: Never Used   Vaping Use    Vaping Use: Never used   Substance Use Topics    Alcohol use: Never     Comment: none per [facility] Drug use: Never       Review of Systems   Musculoskeletal: Positive for back pain  All other systems reviewed and are negative  Physical Exam  Physical Exam  Vitals and nursing note reviewed  Constitutional:       Appearance: He is well-developed  HENT:      Head: Normocephalic and atraumatic  Eyes:      Conjunctiva/sclera: Conjunctivae normal    Cardiovascular:      Rate and Rhythm: Normal rate and regular rhythm  Heart sounds: No murmur heard  Pulmonary:      Effort: Pulmonary effort is normal  No respiratory distress  Breath sounds: Normal breath sounds  Abdominal:      Palpations: Abdomen is soft  Tenderness: There is no abdominal tenderness  Musculoskeletal:         General: Tenderness (Palpable tenderness and left paraspinal muscle around area of T4 reproducing patient's pain) present  Cervical back: Neck supple  Skin:     General: Skin is warm and dry  Capillary Refill: Capillary refill takes 2 to 3 seconds  Neurological:      General: No focal deficit present  Mental Status: He is alert           Vital Signs  ED Triage Vitals [08/01/22 1859]   Temperature Pulse Respirations Blood Pressure SpO2   98 1 °F (36 7 °C) 85 18 (!) 160/116 100 %      Temp Source Heart Rate Source Patient Position - Orthostatic VS BP Location FiO2 (%)   Oral Monitor Lying Left arm --      Pain Score       7           Vitals:    08/01/22 1859   BP: (!) 160/116   Pulse: 85   Patient Position - Orthostatic VS: Lying         Visual Acuity      ED Medications  Medications - No data to display    Diagnostic Studies  Results Reviewed     None                 No orders to display              Procedures  Procedures         ED Course                               SBIRT 22yo+    Flowsheet Row Most Recent Value   SBIRT (23 yo +)    In order to provide better care to our patients, we are screening all of our patients for alcohol and drug use  Would it be okay to ask you these screening questions? No Filed at: 08/01/2022 1917                    [facility]  Number of Diagnoses or Management Options  Acute left-sided thoracic back pain  Diagnosis management comments: I evaluated the patient  Physical exam is entirely consistent with musculoskeletal etiology as his inability to flex or rotate neck at onset the gradually improved over time  Disposition  Final diagnoses:   Acute left-sided thoracic back pain     Time reflects when diagnosis was documented in both MDM as applicable and the Disposition within this note     Time User Action Codes Description Comment    8/1/2022  7:10 PM Lupillo Almanza Add [M54 6] Acute left-sided thoracic back pain       ED Disposition     ED Disposition   Discharge    Condition   Stable    Date/Time   Mon Aug 1, 2022  7:10 PM    Comment   Mau Engel discharge to home/self care                 Follow-up Information     Follow up With Specialties Details Why Contact Info    Paula Manley MD Internal Medicine In 2 days  [address] [place] [address]  [phone]            Discharge Medication List as of 8/1/2022  7:15 PM      CONTINUE these medications which have NOT CHANGED    Details   AMILoride 5 mg tablet Take 2 tablets (10 mg total) by mouth 2 (two) times a day PATIENT TAKES ONE TABLET BID, Starting Thu 3/24/2022, Until Mon 8/1/2022, Normal      ASHWAGANDHA PO Take by mouth daily, Historical Med      carvedilol (COREG) 12 5 mg tablet TAKE 1 TABLET BY MOUTH TWICE A DAY WITH FOOD, Normal Potassium Chloride ER 20 MEQ TBCR TAKE 1 TABLET BY MOUTH TWICE A DAY, Normal                 PDMP Review       Value Time User    PDMP Reviewed  Yes 8/1/2022  7:02 PM Shalonda Baird MD          ED Provider  Electronically Signed by           Shalonda Baird MD  08/01/22 1266

## 2022-08-02 NOTE — TELEPHONE ENCOUNTER
Call placed to the patient per [facility] referral     Unable to LM on VM  The mailbox was full    This is the 1st attempt to reach the patient  Will defer per protocol

## 2022-08-03 ENCOUNTER — TELEPHONE (OUTPATIENT)
Dept: NEUROLOGY | Facility: CLINIC | Age: 42
End: 2022-08-03

## 2022-08-05 NOTE — TELEPHONE ENCOUNTER
Call placed to the patient per [facility] referral     Unable to leave a messgae, the mailbox is full    This the 2nd attempt to reach the patient  Will defer per protocol

## 2022-08-15 ENCOUNTER — HOSPITAL ENCOUNTER (EMERGENCY)
Facility: HOSPITAL | Age: 42
Discharge: HOME | End: 2022-08-15
Attending: EMERGENCY MEDICINE
Payer: OTHER MISCELLANEOUS

## 2022-08-15 ENCOUNTER — APPOINTMENT (EMERGENCY)
Dept: RADIOLOGY | Facility: HOSPITAL | Age: 42
End: 2022-08-15
Payer: OTHER MISCELLANEOUS

## 2022-08-15 ENCOUNTER — APPOINTMENT (EMERGENCY)
Dept: CT IMAGING | Facility: HOSPITAL | Age: 42
End: 2022-08-15
Payer: OTHER MISCELLANEOUS

## 2022-08-15 VITALS
BODY MASS INDEX: 22.22 KG/M2 | TEMPERATURE: 98.7 F | HEIGHT: 69 IN | OXYGEN SATURATION: 100 % | HEART RATE: 102 BPM | WEIGHT: 150 LBS | SYSTOLIC BLOOD PRESSURE: 172 MMHG | RESPIRATION RATE: 20 BRPM | DIASTOLIC BLOOD PRESSURE: 108 MMHG

## 2022-08-15 DIAGNOSIS — S52.002A CLOSED FRACTURE OF PROXIMAL END OF LEFT ULNA, UNSPECIFIED FRACTURE MORPHOLOGY, INITIAL ENCOUNTER: Primary | ICD-10-CM

## 2022-08-15 DIAGNOSIS — S32.591A CLOSED FRACTURE OF RIGHT INFERIOR PUBIC RAMUS, INITIAL ENCOUNTER (HCC): ICD-10-CM

## 2022-08-15 DIAGNOSIS — S32.401A CLOSED NONDISPLACED FRACTURE OF RIGHT ACETABULUM, UNSPECIFIED PORTION OF ACETABULUM, INITIAL ENCOUNTER (HCC): ICD-10-CM

## 2022-08-15 LAB
ABO GROUP BLD: NORMAL
ALBUMIN SERPL BCP-MCNC: 4.4 G/DL (ref 3.5–5)
ALP SERPL-CCNC: 71 U/L (ref 34–104)
ALT SERPL W P-5'-P-CCNC: 15 U/L (ref 7–52)
ANION GAP SERPL CALCULATED.3IONS-SCNC: 7 MMOL/L (ref 4–13)
APTT PPP: 24 SECONDS (ref 23–37)
AST SERPL W P-5'-P-CCNC: 18 U/L (ref 13–39)
BASOPHILS # BLD AUTO: 0.05 THOUSANDS/ΜL (ref 0–0.1)
BASOPHILS NFR BLD AUTO: 0 % (ref 0–1)
BILIRUB SERPL-MCNC: 0.67 MG/DL (ref 0.2–1)
BLD GP AB SCN SERPL QL: NEGATIVE
BUN SERPL-MCNC: 11 MG/DL (ref 5–25)
CALCIUM SERPL-MCNC: 9.2 MG/DL (ref 8.4–10.2)
CHLORIDE SERPL-SCNC: 105 MMOL/L (ref 96–108)
CO2 SERPL-SCNC: 27 MMOL/L (ref 21–32)
CREAT SERPL-MCNC: 1.21 MG/DL (ref 0.6–1.3)
EOSINOPHIL # BLD AUTO: 0.57 THOUSAND/ΜL (ref 0–0.61)
EOSINOPHIL NFR BLD AUTO: 4 % (ref 0–6)
ERYTHROCYTE [DISTWIDTH] IN BLOOD BY AUTOMATED COUNT: 13.2 % (ref 11.6–15.1)
GFR SERPL CREATININE-BSD FRML MDRD: 72 ML/MIN/1.73SQ M
GLUCOSE SERPL-MCNC: 94 MG/DL (ref 65–140)
HCT VFR BLD AUTO: 42.8 % (ref 36.5–49.3)
HGB BLD-MCNC: 14.3 G/DL (ref 12–17)
IMM GRANULOCYTES # BLD AUTO: 0.06 THOUSAND/UL (ref 0–0.2)
IMM GRANULOCYTES NFR BLD AUTO: 1 % (ref 0–2)
INR PPP: 0.98 (ref 0.84–1.19)
LYMPHOCYTES # BLD AUTO: 1.76 THOUSANDS/ΜL (ref 0.6–4.47)
LYMPHOCYTES NFR BLD AUTO: 14 % (ref 14–44)
MCH RBC QN AUTO: 29.1 PG (ref 26.8–34.3)
MCHC RBC AUTO-ENTMCNC: 33.4 G/DL (ref 31.4–37.4)
MCV RBC AUTO: 87 FL (ref 82–98)
MONOCYTES # BLD AUTO: 0.92 THOUSAND/ΜL (ref 0.17–1.22)
MONOCYTES NFR BLD AUTO: 7 % (ref 4–12)
NEUTROPHILS # BLD AUTO: 9.71 THOUSANDS/ΜL (ref 1.85–7.62)
NEUTS SEG NFR BLD AUTO: 74 % (ref 43–75)
NRBC BLD AUTO-RTO: 0 /100 WBCS
PLATELET # BLD AUTO: 228 THOUSANDS/UL (ref 149–390)
PMV BLD AUTO: 9 FL (ref 8.9–12.7)
POTASSIUM SERPL-SCNC: 3.7 MMOL/L (ref 3.5–5.3)
PROT SERPL-MCNC: 6.7 G/DL (ref 6.4–8.4)
PROTHROMBIN TIME: 13.2 SECONDS (ref 11.6–14.5)
RBC # BLD AUTO: 4.92 MILLION/UL (ref 3.88–5.62)
RH BLD: POSITIVE
SODIUM SERPL-SCNC: 139 MMOL/L (ref 135–147)
SPECIMEN EXPIRATION DATE: NORMAL
WBC # BLD AUTO: 13.07 THOUSAND/UL (ref 4.31–10.16)

## 2022-08-15 PROCEDURE — 29125 APPL SHORT ARM SPLINT STATIC: CPT | Performed by: PHYSICIAN ASSISTANT

## 2022-08-15 PROCEDURE — 86901 BLOOD TYPING SEROLOGIC RH(D): CPT | Performed by: PHYSICIAN ASSISTANT

## 2022-08-15 PROCEDURE — 96374 THER/PROPH/DIAG INJ IV PUSH: CPT

## 2022-08-15 PROCEDURE — 36415 COLL VENOUS BLD VENIPUNCTURE: CPT | Performed by: PHYSICIAN ASSISTANT

## 2022-08-15 PROCEDURE — 86900 BLOOD TYPING SEROLOGIC ABO: CPT | Performed by: PHYSICIAN ASSISTANT

## 2022-08-15 PROCEDURE — 96375 TX/PRO/DX INJ NEW DRUG ADDON: CPT

## 2022-08-15 PROCEDURE — 73700 CT LOWER EXTREMITY W/O DYE: CPT

## 2022-08-15 PROCEDURE — 99284 EMERGENCY DEPT VISIT MOD MDM: CPT

## 2022-08-15 PROCEDURE — 85025 COMPLETE CBC W/AUTO DIFF WBC: CPT | Performed by: PHYSICIAN ASSISTANT

## 2022-08-15 PROCEDURE — 85730 THROMBOPLASTIN TIME PARTIAL: CPT | Performed by: PHYSICIAN ASSISTANT

## 2022-08-15 PROCEDURE — 80053 COMPREHEN METABOLIC PANEL: CPT | Performed by: PHYSICIAN ASSISTANT

## 2022-08-15 PROCEDURE — 86850 RBC ANTIBODY SCREEN: CPT | Performed by: PHYSICIAN ASSISTANT

## 2022-08-15 PROCEDURE — 99285 EMERGENCY DEPT VISIT HI MDM: CPT | Performed by: PHYSICIAN ASSISTANT

## 2022-08-15 PROCEDURE — 85610 PROTHROMBIN TIME: CPT | Performed by: PHYSICIAN ASSISTANT

## 2022-08-15 PROCEDURE — 73080 X-RAY EXAM OF ELBOW: CPT

## 2022-08-15 PROCEDURE — G1004 CDSM NDSC: HCPCS

## 2022-08-15 PROCEDURE — 73502 X-RAY EXAM HIP UNI 2-3 VIEWS: CPT

## 2022-08-15 RX ORDER — ONDANSETRON 2 MG/ML
4 INJECTION INTRAMUSCULAR; INTRAVENOUS ONCE
Status: COMPLETED | OUTPATIENT
Start: 2022-08-15 | End: 2022-08-15

## 2022-08-15 RX ORDER — ACETAMINOPHEN 325 MG/1
975 TABLET ORAL ONCE
Status: COMPLETED | OUTPATIENT
Start: 2022-08-15 | End: 2022-08-15

## 2022-08-15 RX ORDER — OXYCODONE HYDROCHLORIDE 5 MG/1
5 TABLET ORAL EVERY 6 HOURS PRN
Qty: 12 TABLET | Refills: 0 | Status: SHIPPED | OUTPATIENT
Start: 2022-08-15 | End: 2022-08-18

## 2022-08-15 RX ORDER — MORPHINE SULFATE 4 MG/ML
4 INJECTION, SOLUTION INTRAMUSCULAR; INTRAVENOUS ONCE
Status: COMPLETED | OUTPATIENT
Start: 2022-08-15 | End: 2022-08-15

## 2022-08-15 RX ADMIN — ONDANSETRON 4 MG: 2 INJECTION INTRAMUSCULAR; INTRAVENOUS at 17:04

## 2022-08-15 RX ADMIN — MORPHINE SULFATE 4 MG: 4 INJECTION INTRAVENOUS at 18:37

## 2022-08-15 RX ADMIN — ACETAMINOPHEN 975 MG: 325 TABLET, FILM COATED ORAL at 17:08

## 2022-08-15 NOTE — ED TRIAGE NOTES
Via [address] from work w/complaint of fall approximately 6 feel down off of a beam and hit concrete floor striking left elbow and shoulder first; further complains of right hip pain and inability to bear weight to right leg; denies hitting his head and/or LOC;

## 2022-08-15 NOTE — DISCHARGE INSTRUCTIONS
Please return to the emergency department for worsening symptoms including chest pain, shortness of breath, dizziness, lightheadedness, fever greater than 103, severe pain, inability to walk, fainting episodes, etc  Please follow-up with your family practice provider as soon as possible  You have fractures to your left elbow and also 2 year right hip  I have given you a referral to an orthopedic surgeon  Please call in the morning to make an appointment  Your elbow will likely need surgery  Please continue wearing the sling to the left elbow  This will help keep it immobilized  If at any point you lose sensation in your hand or your hand becomes cold, please return to the emergency department for readjustment of your splint  You have fractures to your right hip  I spoke with the orthopedic surgeon who notes he may be weight-bearing as tolerated; you may put weight as you are comfortable on that leg  Please follow-up with orthopedic surgery as soon as possible  I have sent a pain medication over to your pharmacy  Please take as directed  This pain medication does have abuse potential so please take it only as directed and only as necessary

## 2022-08-16 ENCOUNTER — TELEPHONE (OUTPATIENT)
Dept: OBGYN CLINIC | Facility: HOSPITAL | Age: 42
End: 2022-08-16

## 2022-08-16 NOTE — ED PROVIDER NOTES
History  Chief Complaint   Patient presents with    Fall     [address] [place] from work w/complaint of fall approximately 6 feel down off of a beam and hit concrete floor striking left elbow and shoulder first; further complains of right hip pain and inability to bear weight to right leg; denies hitting his head and/or LOC     This is a 47-year-old male with past medical history significant for hypertension and Liddle's syndrome presenting to the emergency department today status post fall  The patient fell off of a being a while he was at work just prior to arrival and came in via private vehicle  It was about a 6 ft fall  He was not wearing protective equipment  He fell on his left elbow and left shoulder but denies any head strike  He is noting significant pain to his left elbow with deformity noted  He is also noting right-sided hip pain inability to bear weight on his right lower extremity  He denies any numbness or tingling  He has no neck pain, thoracic back pain, or lumbar spine pain  No head strike  No anticoagulants or antiplatelets  He denies any loss of consciousness or vomiting  He denies any chest pain or shortness of breath  He denies other areas of injury  The patient denies other complaints at this time  History provided by:  Patient   used: No    Fall  Mechanism of injury: fall    Injury location: left elbow; right hip  Incident location:  Work  Time since incident: just PTA    Arrived directly from scene: yes    Suspicion of alcohol use: no    Suspicion of drug use: no    Tetanus status:  Unknown  Prior to arrival data:     Loss of consciousness: no      Amnesic to event: no    Associated symptoms: no abdominal pain, no back pain, no blindness, no chest pain, no difficulty breathing, no headaches, no hearing loss, no loss of consciousness, no nausea, no neck pain, no seizures and no vomiting        Prior to Admission Medications   Prescriptions Last Dose Informant Patient Reported? Taking? AMILoride 5 mg tablet   No No   Sig: Take 2 tablets (10 mg total) by mouth 2 (two) times a day PATIENT TAKES ONE TABLET BID   ASHWAGANDHA PO  Self Yes No   Sig: Take by mouth daily   Potassium Chloride ER 20 MEQ TBCR   No No   Sig: TAKE 1 TABLET BY MOUTH TWICE A DAY   carvedilol (COREG) 12 5 mg tablet   No No   Sig: TAKE 1 TABLET BY MOUTH TWICE A DAY WITH FOOD      Facility-Administered Medications: None       Past Medical History:   Diagnosis Date    Anxiety     Hypertension     Liddle's syndrome     diagnosed 2 years ago       Past Surgical History:   Procedure Laterality Date    CARDIAC CATHETERIZATION  05/31/2018       Family History   Problem Relation Age of Onset    Hypertension Mother     Hypertension Maternal Grandmother     Diabetes Neg Hx     Coronary artery disease Neg Hx      I have reviewed and agree with the history as documented  E-Cigarette/Vaping    E-Cigarette Use Never User      E-Cigarette/Vaping Substances    Nicotine No     THC No     CBD No     Flavoring No      Social History     Tobacco Use    Smoking status: Never Smoker    Smokeless tobacco: Never Used   Vaping Use    Vaping Use: Never used   Substance Use Topics    Alcohol use: Never     Comment: none per [facility] Drug use: Never       Review of Systems   Constitutional: Negative for appetite change, chills, diaphoresis, fatigue and fever  HENT: Negative for hearing loss  Eyes: Negative for blindness and visual disturbance  Respiratory: Negative for cough, chest tightness, shortness of breath and wheezing  Cardiovascular: Negative for chest pain, palpitations and leg swelling  Gastrointestinal: Negative for abdominal pain, constipation, diarrhea, nausea and vomiting  Musculoskeletal: Positive for arthralgias (left elbow; right hip)  Negative for back pain, neck pain and neck stiffness  Skin: Negative for rash and wound     Neurological: Negative for dizziness, seizures, loss of consciousness, syncope, weakness, light-headedness, numbness and headaches  Psychiatric/Behavioral: Negative for confusion  All other systems reviewed and are negative  Physical Exam  Physical Exam  Vitals and nursing note reviewed  Constitutional:       General: He is not in acute distress  Appearance: Normal appearance  He is normal weight  He is not ill-appearing, toxic-appearing or diaphoretic  HENT:      Head: Normocephalic and atraumatic  Comments: Negative landeros sign bilaterally     Right Ear: Tympanic membrane, ear canal and external ear normal  There is no impacted cerumen  Left Ear: Tympanic membrane, ear canal and external ear normal  There is no impacted cerumen  Ears:      Comments: No hemotympanum bilaterally     Nose: Nose normal  No congestion or rhinorrhea  Mouth/Throat:      Mouth: Mucous membranes are moist       Pharynx: No oropharyngeal exudate or posterior oropharyngeal erythema  Eyes:      General: No scleral icterus  Right eye: No discharge  Left eye: No discharge  Conjunctiva/sclera: Conjunctivae normal       Comments: Negative raccoon eyes bilaterally   Neck:      Comments: No tenderness to palpation to cervical spine or paracervical musculature bilaterally; no step-offs or deformities  Cardiovascular:      Rate and Rhythm: Normal rate and regular rhythm  Pulses: Normal pulses  Heart sounds: Normal heart sounds  No murmur heard  No friction rub  No gallop  Comments: 2+ radial and DP pulses bilaterally  Pulmonary:      Effort: Pulmonary effort is normal  No respiratory distress  Breath sounds: Normal breath sounds  No stridor  No wheezing, rhonchi or rales  Comments: Clear to auscultation bilaterally without adventitious breath sounds  Chest:      Chest wall: No tenderness  Abdominal:      General: Abdomen is flat  There is no distension  Palpations: Abdomen is soft  Tenderness:  There is no abdominal tenderness  There is no right CVA tenderness, left CVA tenderness, guarding or rebound  Comments: Soft, nontender, nondistended, and without organomegaly   Musculoskeletal:         General: Normal range of motion  Cervical back: Normal range of motion  No rigidity  Right lower leg: No edema  Left lower leg: No edema  Comments: Significantly decreased range of motion to the left elbow to flexion and extension; also has decreased range of motion to supination and pronation of the left elbow joint; significant deformity noted; tenderness to palpation to left elbow joint  Normal range of motion to the left wrist and shoulder joints without tenderness to palpation throughout  The patient has tenderness to palpation to his right hip with some range of motion to flexion, extension, abduction, and adduction to the right hip joint; left hip is without tenderness to palpation and has normal range of motion throughout  Negative Frederick sign bilaterally  No tenderness to palpation to thoracic spine or lumbar spine midline or to paraspinal musculature; no step-offs or deformities throughout   Skin:     General: Skin is warm and dry  Capillary Refill: Capillary refill takes less than 2 seconds  Coloration: Skin is not jaundiced or pale  Neurological:      General: No focal deficit present  Mental Status: He is alert and oriented to person, place, and time  Mental status is at baseline  Comments:  The patient has normal sensation to the bilateral upper extremities; normal  strength bilaterally  5/5 strength in bilateral upper and lower extremities; normal sensation to the bilateral lower extremities  The patient is able to smile, frown, puff out cheeks, and raise eyebrows bilaterally symmetrically without difficulty  Normal finger-to-nose examination  No cerebellar signs are dysdiadochokinesia  Overall, a normal neurologic assessment   Psychiatric:         Mood and Affect: Mood normal          Behavior: Behavior normal          Vital Signs  ED Triage Vitals   Temperature Pulse Respirations Blood Pressure SpO2   08/15/22 1656 08/15/22 1656 08/15/22 1656 08/15/22 1701 08/15/22 1656   98 7 °F (37 1 °C) 95 18 (!) 171/110 100 %      Temp Source Heart Rate Source Patient Position - Orthostatic VS BP Location FiO2 (%)   08/15/22 1656 08/15/22 1656 08/15/22 1656 08/15/22 1656 --   Oral Monitor Lying Left arm       Pain Score       08/15/22 1656       8           Vitals:    08/15/22 1656 08/15/22 1701 08/15/22 1840   BP:  (!) 171/110 (!) 172/108   Pulse: 95  102   Patient Position - Orthostatic VS: Lying  Lying         Visual Acuity      ED Medications  Medications   morphine injection 4 mg (4 mg Intravenous Given 8/15/22 1837)   ondansetron (ZOFRAN) injection 4 mg (4 mg Intravenous Given 8/15/22 1704)   acetaminophen (TYLENOL) tablet 975 mg (975 mg Oral Given 8/15/22 1708)       Diagnostic Studies  Results Reviewed     Procedure Component Value Units Date/Time    Comprehensive metabolic panel [[id number]] Collected: 08/15/22 1659    Lab Status: Final result Specimen: Blood from Arm, Right Updated: 08/15/22 1722     Sodium 139 mmol/L      Potassium 3 7 mmol/L      Chloride 105 mmol/L      CO2 27 mmol/L      ANION GAP 7 mmol/L      BUN 11 mg/dL      Creatinine 1 21 mg/dL      Glucose 94 mg/dL      Calcium 9 2 mg/dL      AST 18 U/L      ALT 15 U/L      Alkaline Phosphatase 71 U/L      Total Protein 6 7 g/dL      Albumin 4 4 g/dL      Total Bilirubin 0 67 mg/dL      eGFR 72 ml/min/1 73sq m     Narrative:      [place] guidelines for Chronic Kidney Disease (CKD):     Stage 1 with normal or high GFR (GFR > 90 mL/min/1 73 square meters)    Stage 2 Mild CKD (GFR = 60-89 mL/min/1 73 square meters)    Stage 3A Moderate CKD (GFR = 45-59 mL/min/1 73 square meters)    Stage 3B Moderate CKD (GFR = 30-44 mL/min/1 73 square meters)    Stage 4 Severe CKD (GFR = 15-29 mL/min/1 73 square meters)    Stage 5 End Stage CKD (GFR <15 mL/min/1 73 square meters)  Note: GFR calculation is accurate only with a steady state creatinine    Protime-INR [[id number]]  (Normal) Collected: 08/15/22 1659    Lab Status: Final result Specimen: Blood from Arm, Right Updated: 08/15/22 1712     Protime 13 2 seconds      INR 0 98    APTT [[id number]]  (Normal) Collected: 08/15/22 1659    Lab Status: Final result Specimen: Blood from Arm, Right Updated: 08/15/22 1712     PTT 24 seconds     CBC and differential [[id number]]  (Abnormal) Collected: 08/15/22 1659    Lab Status: Final result Specimen: Blood from Arm, Right Updated: 08/15/22 1703     WBC 13 07 Thousand/uL      RBC 4 92 Million/uL      Hemoglobin 14 3 g/dL      Hematocrit 42 8 %      MCV 87 fL      MCH 29 1 pg      MCHC 33 4 g/dL      RDW 13 2 %      MPV 9 0 fL      Platelets 274 Thousands/uL      nRBC 0 /100 WBCs      Neutrophils Relative 74 %      Immat GRANS % 1 %      Lymphocytes Relative 14 %      Monocytes Relative 7 %      Eosinophils Relative 4 %      Basophils Relative 0 %      Neutrophils Absolute 9 71 Thousands/µL      Immature Grans Absolute 0 06 Thousand/uL      Lymphocytes Absolute 1 76 Thousands/µL      Monocytes Absolute 0 92 Thousand/µL      Eosinophils Absolute 0 57 Thousand/µL      Basophils Absolute 0 05 Thousands/µL                  CT lower extremity wo contrast right   Final Result by Jarvis Mazariegos MD (08/15 1857)   Nondisplaced right acetabular and right inferior pubic ramus fractures  The study was marked in [facility] for immediate notification  Workstation performed: [id number]         XR elbow 3+ views LEFT   Final Result by Cece Faust MD (08/15 2252)      Olecranon fracture  Workstation performed: [id number]         XR hip/pelv 2-3 vws right   Final Result by Cece Faust MD (08/15 2246)      No acute findings    Note is made that patient has subsequent CT demonstrating a radiographically occult right acetabular and inferior pubic ramus fracture  Workstation performed: [id number]                    Procedures  Splint application    Date/Time: 8/15/2022 6:00 PM  Performed by: Wendy Silveira PA-C  Authorized by: Wendy Silveira PA-C   [place] Protocol:  Procedure performed by: Briana Parks)  Consent given by: patient  Required items: required blood products, implants, devices, and special equipment available  Patient identity confirmed: verbally with patient      Pre-procedure details:     Sensation:  Normal    Skin color:  Pink; Well-Perfused  Procedure details:     Laterality:  Left    Location:  Elbow    Elbow:  L elbow    Strapping: no      Splint type: posterior long arm splint  Supplies:  Fiberglass, cotton padding and elastic bandage  Post-procedure details:     Pain:  Improved    Sensation:  Normal    Skin color:  Pink; Well-Perfused    Patient tolerance of procedure: Tolerated well, no immediate complications             ED Course  ED Course as of 08/16/22 1018   Mon Aug 15, 2022   1925 Spoke with Dr Catalina Loaiza with orthopedics  The patient will likely require surgical intervention for the left elbow  In the meanwhile, he recommends placement and posterior long-arm splint which was applied here in the emergency department  Dr Catalina Loaiza also recommends weight-bearing as tolerated for the right lower extremity acetabular and inferior pubic ramus fractures  Recommend outpatient follow-up with orthopedics  The patient was ambulatory here in the emergency department  He has been given a cane to help him ambulated home  He notes he has help from his wife at home and he would like to be discharged and he will follow-up outpatient with orthopedics  Dispo planning                                               MDM  Number of Diagnoses or Management Options  Closed fracture of proximal end of left ulna, unspecified fracture morphology, initial encounter: new and requires workup  Closed fracture of right inferior pubic ramus, initial encounter [facility]): new and requires workup  Closed nondisplaced fracture of right acetabulum, unspecified portion of acetabulum, initial encounter [facility]): new and requires workup  Diagnosis management comments: 49-year-old male presenting to the emergency department today with left-sided elbow pain and right-sided hip pain status post fall  Denies head strike  Denies neck pain  Not on anticoagulant or antiplatelets  No loss of consciousness  Vitals are stable  Deformity noted to the left elbow  Normal neurovascular assessment of the left upper extremity  The patient has tenderness to palpation with decreased range of motion to the right hip  Noting some difficulty with walking initially  X-ray of the left elbow shows an olecranon fracture  X-ray of the hip and pelvis initially negative for any acute fractures; hip CT shows a nondisplaced acetabular and inferior pubic ramus fracture  Normal neurologic assessment and no head strike; no indication for CT at this time  Patient denies other areas of tenderness and elicit no other tenderness on examination; no indication for additional imaging  I discussed the case with Dr Charlene Flores with orthopedic surgery who recommends posterior long-arm splint for the olecranon fracture  For the acetabular fracture and inferior pubic ramus fracture he recommends outpatient management and weight-bearing as tolerated  The patient was ambulatory here in the emergency department with a cane and notes he is insistent on going home  The patient is stable for discharge at this time  Gave the patient an ambulatory referral to Orthopedic surgery  Oxycodone sent to the patient's pharmacy; safe narcotic use was discussed with the patient  Patient was discharge in a sling and posterior long-arm splint  Also given a cane to aid with ambulation  Strict return precautions were given    Recommend PCP follow-up as soon as possible  The patient and/or patient's proxy verify their understanding and agree to the plan at this time  All questions answered to the patient and/or their proxy's satisfaction  All labs reviewed and utilized in the medical decision making process  All radiology studies independently viewed by me and interpreted by the radiologist  Portions of the record may have been created with voice recognition software   Occasional wrong word or "sound a like" substitutions may have occurred due to the inherent limitations of voice recognition software   Read the chart carefully and recognize, using context, where substitutions have occurred         Amount and/or Complexity of Data Reviewed  Clinical lab tests: ordered and reviewed  Tests in the radiology section of CPT®: ordered and reviewed  Discuss the patient with other providers: yes (Dr Magen Erickson - Orthopedic Surgery)  Independent visualization of images, tracings, or specimens: yes (XR Left Elbow  XR Right Hip and Pelvis)    Patient Progress  Patient progress: stable      Disposition  Final diagnoses:   Closed fracture of proximal end of left ulna, unspecified fracture morphology, initial encounter   Closed nondisplaced fracture of right acetabulum, unspecified portion of acetabulum, initial encounter ([facility] )   Closed fracture of right inferior pubic ramus, initial encounter ([facility] )     Time reflects when diagnosis was documented in both MDM as applicable and the Disposition within this note     Time User Action Codes Description Comment    8/15/2022  5:57 PM Ruby Colmenares Add [S52 002A] Closed fracture of proximal end of left ulna, unspecified fracture morphology, initial encounter     8/15/2022  7:18 PM Ruby Colmenares Add [S32 401A] Closed nondisplaced fracture of right acetabulum, unspecified portion of acetabulum, [facility] )     8/15/2022  7:18 PM Ruby Colmenares Add [S32 591A] Closed fracture of right inferior pubic ramus, initial encounter [facility])       ED Disposition     ED Disposition   Discharge    Condition   Stable    Date/Time   Mon Aug 15, 2022  7:24 PM    Comment   Nellmarlena Liao discharge to home/self care                 Follow-up Information     Follow up With Specialties Details Why Contact Info Additional Information    Jess Hernandez MD Internal Medicine Schedule an appointment as soon as possible for a visit   [address] [facility] [phone]       [address] Emergency Department Emergency Medicine Go to  If symptoms worsen [address] [id number]  [address] [facility] Emergency Department, [address] [facility], [address] [place] [address] Orthopedic Surgery Schedule an appointment as soon as possible for a visit   [address] [facility] [address] Specialists [facility] Orthopedic Surgery Call   [address] [facility] [address] [facility] [address] [facility], [address] [place] [address] [place] [phone]          Discharge Medication List as of 8/15/2022  7:24 PM      START taking these medications    Details   oxyCODONE (ROXICODONE) 5 immediate release tablet Take 1 tablet (5 mg total) by mouth every 6 (six) hours as needed for moderate pain or severe pain for up to 3 days Max Daily Amount: 20 mg, Starting Mon 8/15/2022, Until Thu 8/18/2022 at 2359, Normal         CONTINUE these medications which have NOT CHANGED    Details   AMILoride 5 mg tablet Take 2 tablets (10 mg total) by mouth 2 (two) times a day PATIENT TAKES ONE TABLET BID, Starting Thu 3/24/2022, Until Mon 8/1/2022, Normal      ASHWAGANDHA PO Take by mouth daily, Historical Med      carvedilol (COREG) 12 5 mg tablet TAKE 1 TABLET BY MOUTH TWICE A DAY WITH FOOD, Normal      Potassium Chloride ER 20 MEQ TBCR TAKE 1 TABLET BY MOUTH TWICE A DAY, Normal                 PDMP Review Value Time User    PDMP Reviewed  Yes 8/1/2022  7:02 PM Sandoval Negrete MD          ED Provider  Electronically Signed by           Barby Reeves PA-C  08/16/22 1126

## 2022-08-16 NOTE — TELEPHONE ENCOUNTER
Hello,  Please advise if the following patient can be forced onto the schedule:    Patient: Lenice Sacks    : 1980    MRN: [id number]    Call back #: [phone]    Insurance: [employer]    Reason for appointment: displaced olecranon fracture, L elbow    Requested doctor/location: [facility]/Any      Thank you

## 2022-08-18 ENCOUNTER — HOSPITAL ENCOUNTER (OUTPATIENT)
Dept: CT IMAGING | Facility: HOSPITAL | Age: 42
Discharge: HOME | End: 2022-08-18
Payer: COMMERCIAL

## 2022-08-18 ENCOUNTER — OFFICE VISIT (OUTPATIENT)
Dept: OBGYN CLINIC | Facility: CLINIC | Age: 42
End: 2022-08-18

## 2022-08-18 ENCOUNTER — PREP FOR PROCEDURE (OUTPATIENT)
Dept: OBGYN CLINIC | Facility: CLINIC | Age: 42
End: 2022-08-18

## 2022-08-18 VITALS
OXYGEN SATURATION: 98 % | DIASTOLIC BLOOD PRESSURE: 88 MMHG | BODY MASS INDEX: 22.22 KG/M2 | WEIGHT: 150 LBS | HEART RATE: 101 BPM | HEIGHT: 69 IN | SYSTOLIC BLOOD PRESSURE: 144 MMHG

## 2022-08-18 DIAGNOSIS — S32.591A CLOSED FRACTURE OF RIGHT INFERIOR PUBIC RAMUS, INITIAL ENCOUNTER (HCC): ICD-10-CM

## 2022-08-18 DIAGNOSIS — S52.002A CLOSED FRACTURE OF PROXIMAL END OF LEFT ULNA, UNSPECIFIED FRACTURE MORPHOLOGY, INITIAL ENCOUNTER: ICD-10-CM

## 2022-08-18 DIAGNOSIS — S32.401A CLOSED NONDISPLACED FRACTURE OF RIGHT ACETABULUM, UNSPECIFIED PORTION OF ACETABULUM, INITIAL ENCOUNTER (HCC): ICD-10-CM

## 2022-08-18 PROCEDURE — G1004 CDSM NDSC: HCPCS

## 2022-08-18 PROCEDURE — 73200 CT UPPER EXTREMITY W/O DYE: CPT

## 2022-08-18 PROCEDURE — 99243 OFF/OP CNSLTJ NEW/EST LOW 30: CPT | Performed by: ORTHOPAEDIC SURGERY

## 2022-08-18 RX ORDER — CEFAZOLIN SODIUM 1 G/50ML
1000 SOLUTION INTRAVENOUS ONCE
Status: CANCELLED | OUTPATIENT
Start: 2022-08-24 | End: 2022-08-18

## 2022-08-18 RX ORDER — CHLORHEXIDINE GLUCONATE 4 G/100ML
SOLUTION TOPICAL DAILY PRN
Status: CANCELLED | OUTPATIENT
Start: 2022-08-18

## 2022-08-18 RX ORDER — CHLORHEXIDINE GLUCONATE 0.12 MG/ML
15 RINSE ORAL ONCE
Status: CANCELLED | OUTPATIENT
Start: 2022-08-18 | End: 2022-08-18

## 2022-08-18 NOTE — H&P (VIEW-ONLY)
[address] [address] [place] [id number]  [phone]       Mita Moses  [id number]  1980    ORTHOPAEDIC SURGERY OUTPATIENT NOTE  8/18/2022      HISTORY:  38 y o  male  Presents for evaluation of left elbow  On 8/15 he had a fall at work from a ladder 6 feet up  He was seen in the Ed and found to have a left olecranon fracture  He was splinted   He was also found to have a     Past Medical History:   Diagnosis Date    Anxiety     Hypertension     Liddle's syndrome     diagnosed 2 years ago       Past Surgical History:   Procedure Laterality Date    CARDIAC CATHETERIZATION  05/31/2018       Social History     Socioeconomic History    Marital status: Single     Spouse name: Not on file    Number of children: Not on file    Years of education: Not on file    Highest education level: Not on file   Occupational History    Not on file   Tobacco Use    Smoking status: Never Smoker    Smokeless tobacco: Never Used   Vaping Use    Vaping Use: Never used   Substance and Sexual Activity    Alcohol use: Never     Comment: none per minesh    Drug use: Never    Sexual activity: Yes     Partners: Female   Other Topics Concern    Not on file   Social History Narrative    · Most recent tobacco use screening:   10-      · Do you currently or have you served in [employer]:   No      · Live alone or with others:   with others      · Exercise level:   Occasional      · General stress level:   Medium      · Diet:   Regular      · Caffeine intake:   None      · Last modified by [id number]   07-, 03:32      Social Determinants of Health     Financial Resource Strain: Not on file   Food Insecurity: Not on file   Transportation Needs: Not on file   Physical Activity: Not on file   Stress: Not on file   Social Connections: Not on file   Intimate Partner Violence: Not on file   Housing Stability: Not on file Family History   Problem Relation Age of Onset    Hypertension Mother     Hypertension Maternal Grandmother     Diabetes Neg Hx     Coronary artery disease Neg Hx         Patient's Medications   New Prescriptions    No medications on file   Previous Medications    AMILORIDE 5 MG TABLET    Take 2 tablets (10 mg total) by mouth 2 (two) times a day PATIENT TAKES ONE TABLET BID    ASHWAGANDHA PO    Take by mouth daily    CARVEDILOL (COREG) 12 5 MG TABLET    TAKE 1 TABLET BY MOUTH TWICE A DAY WITH FOOD    OXYCODONE (ROXICODONE) 5 IMMEDIATE RELEASE TABLET    Take 1 tablet (5 mg total) by mouth every 6 (six) hours as needed for moderate pain or severe pain for up to 3 days Max Daily Amount: 20 mg    POTASSIUM CHLORIDE ER 20 MEQ TBCR    TAKE 1 TABLET BY MOUTH TWICE A DAY   Modified Medications    No medications on file   Discontinued Medications    No medications on file       No Known Allergies     /88   Pulse 101   Ht 5' 9" (1 753 m)   Wt 68 kg (150 lb)   SpO2 98%   BMI 22 15 kg/m²      REVIEW OF SYSTEMS:  Constitutional: Negative  HEENT: Negative  Respiratory: Negative  Skin: Negative  Neurological: Negative  Psychiatric/Behavioral: Negative  Musculoskeletal: Negative except for that mentioned in the HPI  /88   Pulse 101   Ht 5' 9" (1 753 m)   Wt 68 kg (150 lb)   SpO2 98%   BMI 22 15 kg/m²   Gen: No acute distress, resting comfortably in bed  HEENT: Eyes clear, moist mucus membranes, hearing intact  Respiratory: No audible wheezing or stridor  Cardiovascular: Well Perfused peripherally, 2+ distal pulse  Abdomen: nondistended, no peritoneal signs     PHYSICAL EXAM:      Left elbow:  Splinted   Skin examined under the splint with 2 small fracture blisters on the skin  ROM not assessed  SILT m/r/u   +finger ROM  Brisk CR    IMAGING:  XR left elbow reviewed demonstrates displaced comminuted olecranon fracture    ASSESSMENT AND PLAN:  38 y o  male  With left comminuted displaced olecranon fracture  Discussed with the patient his imaging findings and treatment course  This fracture requires surgical fixation  Informed consent was signed for ORIF left olecranon  We will order a CT scan for pre-op planning  The patient understands the risks and benefits of the procedure with risks including pain, stiffness, infection, neurovascular injury, recurrence of symptoms, failure of surgical procedure, inadvertent intraoperative complications, blood loss, blood clots, allergic reaction to anesthesia, stroke, heart attack, all up to and including to death  The patient understood and did consent for surgery today

## 2022-08-18 NOTE — PROGRESS NOTES
[address] [address] [place] [id number]  [phone]       Kathy Campos  [id number]  1980    ORTHOPAEDIC SURGERY OUTPATIENT NOTE  8/18/2022      HISTORY:  38 y o  male  Presents for evaluation of left elbow  On 8/15 he had a fall at work from a ladder 6 feet up  He was seen in the Ed and found to have a left olecranon fracture  He was splinted   He was also found to have a     Past Medical History:   Diagnosis Date    Anxiety     Hypertension     Liddle's syndrome     diagnosed 2 years ago       Past Surgical History:   Procedure Laterality Date    CARDIAC CATHETERIZATION  05/31/2018       Social History     Socioeconomic History    Marital status: Single     Spouse name: Not on file    Number of children: Not on file    Years of education: Not on file    Highest education level: Not on file   Occupational History    Not on file   Tobacco Use    Smoking status: Never Smoker    Smokeless tobacco: Never Used   Vaping Use    Vaping Use: Never used   Substance and Sexual Activity    Alcohol use: Never     Comment: none per minesh    Drug use: Never    Sexual activity: Yes     Partners: Female   Other Topics Concern    Not on file   Social History Narrative    · Most recent tobacco use screening:   10-      · Do you currently or have you served in [employer]:   No      · Live alone or with others:   with others      · Exercise level:   Occasional      · General stress level:   Medium      · Diet:   Regular      · Caffeine intake:   None      · Last modified by [id number]   07-, 03:32      Social Determinants of Health     Financial Resource Strain: Not on file   Food Insecurity: Not on file   Transportation Needs: Not on file   Physical Activity: Not on file   Stress: Not on file   Social Connections: Not on file   Intimate Partner Violence: Not on file   Housing Stability: Not on file Family History   Problem Relation Age of Onset    Hypertension Mother     Hypertension Maternal Grandmother     Diabetes Neg Hx     Coronary artery disease Neg Hx         Patient's Medications   New Prescriptions    No medications on file   Previous Medications    AMILORIDE 5 MG TABLET    Take 2 tablets (10 mg total) by mouth 2 (two) times a day PATIENT TAKES ONE TABLET BID    ASHWAGANDHA PO    Take by mouth daily    CARVEDILOL (COREG) 12 5 MG TABLET    TAKE 1 TABLET BY MOUTH TWICE A DAY WITH FOOD    OXYCODONE (ROXICODONE) 5 IMMEDIATE RELEASE TABLET    Take 1 tablet (5 mg total) by mouth every 6 (six) hours as needed for moderate pain or severe pain for up to 3 days Max Daily Amount: 20 mg    POTASSIUM CHLORIDE ER 20 MEQ TBCR    TAKE 1 TABLET BY MOUTH TWICE A DAY   Modified Medications    No medications on file   Discontinued Medications    No medications on file       No Known Allergies     /88   Pulse 101   Ht 5' 9" (1 753 m)   Wt 68 kg (150 lb)   SpO2 98%   BMI 22 15 kg/m²      REVIEW OF SYSTEMS:  Constitutional: Negative  HEENT: Negative  Respiratory: Negative  Skin: Negative  Neurological: Negative  Psychiatric/Behavioral: Negative  Musculoskeletal: Negative except for that mentioned in the HPI  /88   Pulse 101   Ht 5' 9" (1 753 m)   Wt 68 kg (150 lb)   SpO2 98%   BMI 22 15 kg/m²   Gen: No acute distress, resting comfortably in bed  HEENT: Eyes clear, moist mucus membranes, hearing intact  Respiratory: No audible wheezing or stridor  Cardiovascular: Well Perfused peripherally, 2+ distal pulse  Abdomen: nondistended, no peritoneal signs     PHYSICAL EXAM:      Left elbow:  Splinted   Skin examined under the splint with 2 small fracture blisters on the skin  ROM not assessed  SILT m/r/u   +finger ROM  Brisk CR    IMAGING:  XR left elbow reviewed demonstrates displaced comminuted olecranon fracture    ASSESSMENT AND PLAN:  38 y o  male  With left comminuted displaced olecranon fracture  Discussed with the patient his imaging findings and treatment course  This fracture requires surgical fixation  Informed consent was signed for ORIF left olecranon  We will order a CT scan for pre-op planning  The patient understands the risks and benefits of the procedure with risks including pain, stiffness, infection, neurovascular injury, recurrence of symptoms, failure of surgical procedure, inadvertent intraoperative complications, blood loss, blood clots, allergic reaction to anesthesia, stroke, heart attack, all up to and including to death  The patient understood and did consent for surgery today

## 2022-08-23 ENCOUNTER — ANESTHESIA EVENT (OUTPATIENT)
Dept: PERIOP | Facility: HOSPITAL | Age: 42
End: 2022-08-23
Payer: OTHER MISCELLANEOUS

## 2022-08-23 NOTE — PRE-PROCEDURE INSTRUCTIONS
Pre-Surgery Instructions:   Medication Instructions    AMILoride 5 mg tablet Hold day of surgery   ASHWAGANDHA PO Stop taking 1 day prior to surgery   carvedilol (COREG) 12 5 mg tablet Take day of surgery   Potassium Chloride ER 20 MEQ TBCR Hold day of surgery  Pre procedure instructions reviewed verbalizes understanding  NPO after MN  Bathing reviewed  Morning meds with water  No NSAIDS   Stop supplements/vitamins

## 2022-08-24 ENCOUNTER — HOSPITAL ENCOUNTER (OUTPATIENT)
Facility: HOSPITAL | Age: 42
Setting detail: OUTPATIENT SURGERY
Discharge: HOME | End: 2022-08-24
Attending: ORTHOPAEDIC SURGERY | Admitting: ORTHOPAEDIC SURGERY
Payer: OTHER MISCELLANEOUS

## 2022-08-24 ENCOUNTER — ANESTHESIA (OUTPATIENT)
Dept: PERIOP | Facility: HOSPITAL | Age: 42
End: 2022-08-24
Payer: OTHER MISCELLANEOUS

## 2022-08-24 ENCOUNTER — APPOINTMENT (OUTPATIENT)
Dept: RADIOLOGY | Facility: HOSPITAL | Age: 42
End: 2022-08-24
Payer: OTHER MISCELLANEOUS

## 2022-08-24 VITALS
BODY MASS INDEX: 23.37 KG/M2 | DIASTOLIC BLOOD PRESSURE: 106 MMHG | WEIGHT: 157.8 LBS | HEIGHT: 69 IN | HEART RATE: 91 BPM | RESPIRATION RATE: 14 BRPM | OXYGEN SATURATION: 97 % | TEMPERATURE: 98.5 F | SYSTOLIC BLOOD PRESSURE: 165 MMHG

## 2022-08-24 DIAGNOSIS — S52.022A CLOSED FRACTURE OF OLECRANON PROCESS OF LEFT ULNA, INITIAL ENCOUNTER: Primary | ICD-10-CM

## 2022-08-24 PROCEDURE — C1713 ANCHOR/SCREW BN/BN,TIS/BN: HCPCS | Performed by: ORTHOPAEDIC SURGERY

## 2022-08-24 PROCEDURE — 24685 OPTX ULNAR FX PROX END W/FIX: CPT | Performed by: PHYSICIAN ASSISTANT

## 2022-08-24 PROCEDURE — 73070 X-RAY EXAM OF ELBOW: CPT

## 2022-08-24 PROCEDURE — 24685 OPTX ULNAR FX PROX END W/FIX: CPT | Performed by: ORTHOPAEDIC SURGERY

## 2022-08-24 DEVICE — 3.5MM CORTEX SCREW SELF-TAPPING 28MM: Type: IMPLANTABLE DEVICE | Site: ELBOW | Status: FUNCTIONAL

## 2022-08-24 DEVICE — 2.7MM VA LCKNG SCREW SLF-TPNG WITH T8 STARDRIVE RECESS 14MM: Type: IMPLANTABLE DEVICE | Site: ELBOW | Status: FUNCTIONAL

## 2022-08-24 DEVICE — 2.7MM VA LCKNG SCREW SLF-TPNG WITH T8 STARDRIVE RECESS 16MM: Type: IMPLANTABLE DEVICE | Site: ELBOW | Status: FUNCTIONAL

## 2022-08-24 DEVICE — 2.7MM VA LCKNG SCREW SLF-TPNG WITH T8 STARDRIVE RECESS 24MM: Type: IMPLANTABLE DEVICE | Site: ELBOW | Status: FUNCTIONAL

## 2022-08-24 DEVICE — 2.7MM VA LCKNG SCREW SLF-TPNG WITH T8 STARDRIVE RECESS 10MM: Type: IMPLANTABLE DEVICE | Site: ELBOW | Status: FUNCTIONAL

## 2022-08-24 DEVICE — 3.5MM LOCKING SCREW SLF-TPNG W/STARDRIVE(TM) RECESS 22MM: Type: IMPLANTABLE DEVICE | Site: ELBOW | Status: FUNCTIONAL

## 2022-08-24 DEVICE — 2.7MM/3.5MM VA-LCP PROXIMAL OLECRANON PL 2H/LT/73MM
Type: IMPLANTABLE DEVICE | Site: ELBOW | Status: FUNCTIONAL
Brand: VA-LCP

## 2022-08-24 RX ORDER — MIDAZOLAM HYDROCHLORIDE 2 MG/2ML
INJECTION, SOLUTION INTRAMUSCULAR; INTRAVENOUS
Status: COMPLETED | OUTPATIENT
Start: 2022-08-24 | End: 2022-08-24

## 2022-08-24 RX ORDER — PROMETHAZINE HYDROCHLORIDE 25 MG/ML
25 INJECTION, SOLUTION INTRAMUSCULAR; INTRAVENOUS ONCE AS NEEDED
Status: DISCONTINUED | OUTPATIENT
Start: 2022-08-24 | End: 2022-08-24 | Stop reason: HOSPADM

## 2022-08-24 RX ORDER — HYDROMORPHONE HCL 110MG/55ML
PATIENT CONTROLLED ANALGESIA SYRINGE INTRAVENOUS AS NEEDED
Status: DISCONTINUED | OUTPATIENT
Start: 2022-08-24 | End: 2022-08-24

## 2022-08-24 RX ORDER — ONDANSETRON 2 MG/ML
INJECTION INTRAMUSCULAR; INTRAVENOUS AS NEEDED
Status: DISCONTINUED | OUTPATIENT
Start: 2022-08-24 | End: 2022-08-24

## 2022-08-24 RX ORDER — CEFAZOLIN SODIUM 1 G/50ML
1000 SOLUTION INTRAVENOUS ONCE
Status: COMPLETED | OUTPATIENT
Start: 2022-08-24 | End: 2022-08-24

## 2022-08-24 RX ORDER — FENTANYL CITRATE/PF 50 MCG/ML
25 SYRINGE (ML) INJECTION
Status: DISCONTINUED | OUTPATIENT
Start: 2022-08-24 | End: 2022-08-24 | Stop reason: HOSPADM

## 2022-08-24 RX ORDER — HYDROMORPHONE HCL/PF 1 MG/ML
0.5 SYRINGE (ML) INJECTION
Status: DISCONTINUED | OUTPATIENT
Start: 2022-08-24 | End: 2022-08-24 | Stop reason: HOSPADM

## 2022-08-24 RX ORDER — CHLORHEXIDINE GLUCONATE 4 G/100ML
SOLUTION TOPICAL DAILY PRN
Status: DISCONTINUED | OUTPATIENT
Start: 2022-08-24 | End: 2022-08-24 | Stop reason: HOSPADM

## 2022-08-24 RX ORDER — FENTANYL CITRATE 50 UG/ML
INJECTION, SOLUTION INTRAMUSCULAR; INTRAVENOUS AS NEEDED
Status: DISCONTINUED | OUTPATIENT
Start: 2022-08-24 | End: 2022-08-24

## 2022-08-24 RX ORDER — CEPHALEXIN 500 MG/1
500 CAPSULE ORAL EVERY 6 HOURS SCHEDULED
Qty: 8 CAPSULE | Refills: 0 | Status: SHIPPED | OUTPATIENT
Start: 2022-08-24 | End: 2022-08-26

## 2022-08-24 RX ORDER — DEXAMETHASONE SODIUM PHOSPHATE 10 MG/ML
INJECTION, SOLUTION INTRAMUSCULAR; INTRAVENOUS AS NEEDED
Status: DISCONTINUED | OUTPATIENT
Start: 2022-08-24 | End: 2022-08-24

## 2022-08-24 RX ORDER — METOCLOPRAMIDE HYDROCHLORIDE 5 MG/ML
10 INJECTION INTRAMUSCULAR; INTRAVENOUS ONCE AS NEEDED
Status: DISCONTINUED | OUTPATIENT
Start: 2022-08-24 | End: 2022-08-24 | Stop reason: HOSPADM

## 2022-08-24 RX ORDER — PROPOFOL 10 MG/ML
INJECTION, EMULSION INTRAVENOUS CONTINUOUS PRN
Status: DISCONTINUED | OUTPATIENT
Start: 2022-08-24 | End: 2022-08-24

## 2022-08-24 RX ORDER — ROPIVACAINE HYDROCHLORIDE 5 MG/ML
INJECTION, SOLUTION EPIDURAL; INFILTRATION; PERINEURAL
Status: COMPLETED | OUTPATIENT
Start: 2022-08-24 | End: 2022-08-24

## 2022-08-24 RX ORDER — VANCOMYCIN HYDROCHLORIDE 1 G/20ML
INJECTION, POWDER, LYOPHILIZED, FOR SOLUTION INTRAVENOUS AS NEEDED
Status: DISCONTINUED | OUTPATIENT
Start: 2022-08-24 | End: 2022-08-24 | Stop reason: HOSPADM

## 2022-08-24 RX ORDER — OXYCODONE HYDROCHLORIDE 5 MG/1
5 TABLET ORAL EVERY 6 HOURS PRN
Qty: 12 TABLET | Refills: 0 | Status: SHIPPED | OUTPATIENT
Start: 2022-08-24 | End: 2022-08-27

## 2022-08-24 RX ORDER — LIDOCAINE HYDROCHLORIDE 10 MG/ML
INJECTION, SOLUTION EPIDURAL; INFILTRATION; INTRACAUDAL; PERINEURAL AS NEEDED
Status: DISCONTINUED | OUTPATIENT
Start: 2022-08-24 | End: 2022-08-24

## 2022-08-24 RX ORDER — SODIUM CHLORIDE, SODIUM LACTATE, POTASSIUM CHLORIDE, CALCIUM CHLORIDE 600; 310; 30; 20 MG/100ML; MG/100ML; MG/100ML; MG/100ML
INJECTION, SOLUTION INTRAVENOUS CONTINUOUS PRN
Status: DISCONTINUED | OUTPATIENT
Start: 2022-08-24 | End: 2022-08-24

## 2022-08-24 RX ORDER — TRANEXAMIC ACID 100 MG/ML
INJECTION, SOLUTION INTRAVENOUS AS NEEDED
Status: DISCONTINUED | OUTPATIENT
Start: 2022-08-24 | End: 2022-08-24

## 2022-08-24 RX ORDER — GLYCOPYRROLATE 0.2 MG/ML
INJECTION INTRAMUSCULAR; INTRAVENOUS AS NEEDED
Status: DISCONTINUED | OUTPATIENT
Start: 2022-08-24 | End: 2022-08-24

## 2022-08-24 RX ORDER — CHLORHEXIDINE GLUCONATE 0.12 MG/ML
15 RINSE ORAL ONCE
Status: DISCONTINUED | OUTPATIENT
Start: 2022-08-24 | End: 2022-08-24 | Stop reason: HOSPADM

## 2022-08-24 RX ADMIN — FENTANYL CITRATE 25 MCG: 50 INJECTION, SOLUTION INTRAMUSCULAR; INTRAVENOUS at 07:47

## 2022-08-24 RX ADMIN — SODIUM CHLORIDE, SODIUM LACTATE, POTASSIUM CHLORIDE, AND CALCIUM CHLORIDE: .6; .31; .03; .02 INJECTION, SOLUTION INTRAVENOUS at 07:24

## 2022-08-24 RX ADMIN — ONDANSETRON 4 MG: 2 INJECTION INTRAMUSCULAR; INTRAVENOUS at 08:49

## 2022-08-24 RX ADMIN — DEXAMETHASONE SODIUM PHOSPHATE 10 MG: 10 INJECTION INTRAMUSCULAR; INTRAVENOUS at 07:30

## 2022-08-24 RX ADMIN — HYDROMORPHONE HYDROCHLORIDE 0.2 MG: 2 INJECTION, SOLUTION INTRAMUSCULAR; INTRAVENOUS; SUBCUTANEOUS at 07:53

## 2022-08-24 RX ADMIN — CEFAZOLIN SODIUM 1000 MG: 1 SOLUTION INTRAVENOUS at 07:40

## 2022-08-24 RX ADMIN — DEXAMETHASONE SODIUM PHOSPHATE 5 MG: 10 INJECTION INTRAMUSCULAR; INTRAVENOUS at 07:21

## 2022-08-24 RX ADMIN — HYDROMORPHONE HYDROCHLORIDE 0.6 MG: 2 INJECTION, SOLUTION INTRAMUSCULAR; INTRAVENOUS; SUBCUTANEOUS at 08:55

## 2022-08-24 RX ADMIN — TRANEXAMIC ACID 1 G: 1 INJECTION, SOLUTION INTRAVENOUS at 09:12

## 2022-08-24 RX ADMIN — HYDROMORPHONE HYDROCHLORIDE 0.2 MG: 2 INJECTION, SOLUTION INTRAMUSCULAR; INTRAVENOUS; SUBCUTANEOUS at 07:44

## 2022-08-24 RX ADMIN — LIDOCAINE HYDROCHLORIDE 50 MG: 10 INJECTION, SOLUTION EPIDURAL; INFILTRATION; INTRACAUDAL; PERINEURAL at 07:30

## 2022-08-24 RX ADMIN — PROPOFOL 100 MCG/KG/MIN: 10 INJECTION, EMULSION INTRAVENOUS at 07:30

## 2022-08-24 RX ADMIN — MIDAZOLAM HYDROCHLORIDE 2 MG: 1 INJECTION, SOLUTION INTRAMUSCULAR; INTRAVENOUS at 07:18

## 2022-08-24 RX ADMIN — Medication 20 MG: at 08:19

## 2022-08-24 RX ADMIN — ROPIVACAINE HYDROCHLORIDE 25 ML: 5 INJECTION, SOLUTION EPIDURAL; INFILTRATION; PERINEURAL at 07:21

## 2022-08-24 RX ADMIN — Medication 30 MG: at 07:30

## 2022-08-24 RX ADMIN — GLYCOPYRROLATE 0.2 MCG: 0.2 INJECTION, SOLUTION INTRAMUSCULAR; INTRAVENOUS at 07:30

## 2022-08-24 NOTE — OP NOTE
OPERATIVE REPORT  PATIENT NAME: Nell Liao    :  1980  MRN: [id number]  Pt Location: [facility] OR ROOM 01    SURGERY DATE: 2022    Surgeon(s) and Role:     * Farnaz Lora - Primary     * Za Engle PA-C - Assisting    Preop Diagnosis:  Closed fracture of proximal end of left ulna, unspecified fracture morphology, initial encounter [S5 002A]    Post-Op Diagnosis Codes:     * Closed fracture of proximal end of left ulna, unspecified fracture morphology, initial encounter [S52 002A]    Procedure(s) (LRB):  OPEN REDUCTION W/ INTERNAL FIXATION (ORIF) ELBOW- left olecranon and all associated reconstructive procedure (Left)    Specimen(s):  * No specimens in log *    Estimated Blood Loss:   Minimal    Drains:  * No LDAs found *    Anesthesia Type:   General w/ Interscalene Block    Operative Indications:  Closed fracture of proximal end of left ulna, unspecified fracture morphology, initial encounter [S5 002A]      Operative Findings:  Transverse, displaced olecranon fracture    Complications:   None    Procedure and Technique:  The patient was seen in the preoperative holding area where the operative extremity was marked  He was taken operating room placed in lateral decubitus position  His left upper extremity was prepped and draped in usual sterile fashion  A time-out was called and patient was administered Ancef 2 g IV prior to incision  Using a 15 blade knife a curvilinear incision was made over the olecranon  Subcutaneous dissection was taken down to the fracture site  Fracture hematoma was encountered and this was debrided using a combination of irrigation, curette, and rongeur  The 2 fracture ends of the olecranon were cleaned with curette and rongeur is to allow for anatomic reduction and apposition  Two K-wires were placed for provisional fixation  Intraoperative fluoro demonstrated anatomic reduction of the olecranon fracture  A Synthes olecranon plate was then placed on the ulna    A subsequent drilling and filling with cortical and locking screws and then performed under fluoroscopic imaging  Once the plate was fixated  Intraoperative fluoro demonstrated anatomic alignment of the fracture site and appropriate length of the screws  Once the fracture site was fixated the wounds copiously irrigated  Vancomycin powder was placed deep to the wound  The fascia was closed with 0 Vicryl  Subcutaneous tissue was closed with 2-0 and 3-0 Monocryl  Exofin was placed  Dressings included 4 x 4 gauze, Xeroform, Webril, a posterior fiberglass splint and Ace bandage dressing  Patient is placed on regular sling  There were no complications throughout the case     I was present for the entire procedure, A qualified resident physician was not available and A physician assistant was required during the procedure for retraction tissue handling,dissection and suturing    Patient Disposition:  PACU       SIGNATURE: Farnaz Garciamabel  DATE: August 24, 2022  TIME: 9:25 AM

## 2022-08-24 NOTE — ANESTHESIA POSTPROCEDURE EVALUATION
Post-Op Assessment Note    CV Status:  Stable  Pain Score: 0    Pain management: adequate     Mental Status:  Confused   Hydration Status:  Euvolemic   PONV Controlled:  Controlled   Airway Patency:  Patent      Post Op Vitals Reviewed: Yes      Staff: Anesthesiologist, CRNA         No complications documented      BP  156/110   Temp   97 5   Pulse 110   Resp   18   SpO2   99

## 2022-08-24 NOTE — DISCHARGE INSTRUCTIONS
Farnaz Lora,     Orthopedic Surgery, Shoulder/Elbow and Sports Medicine  [facility]   [address] [facility], [address] [place]  Phone: [phone]    General Post-op Surgical Instructions:    Date of Procedure - 08/24/22     Procedure - Left olecranon fracture open reduction internal fixation    Weight Bearing Status - non weight bearing      PT/OT Instructions - no formal therapy yet, movement of fingers encouraged  Stitches/Staples - Will be removed at 7-10 days postop; we will then place steristrips on incision site    Wound Care - N/A - keep splint on and clean and dry until first office follow up visit    Xray follow up - to be done at or prior to office visit follow-up if indicated    Additional Info - Please complete your course of antibiotics - keflex 500 mg every 6 hours for 48 hours    Any questions or concerns please call [phone] please!

## 2022-08-24 NOTE — ANESTHESIA PREPROCEDURE EVALUATION
Procedure:  OPEN REDUCTION W/ INTERNAL FIXATION (ORIF) ELBOW- left olecranon and all associated reconstructive procedure (Left Elbow)    Relevant Problems   CARDIO   (+) Other secondary hypertension        Physical Exam    Airway    Mallampati score: IV  TM Distance: >3 FB  Neck ROM: full     Dental   No notable dental hx     Cardiovascular  Cardiovascular exam normal    Pulmonary  Pulmonary exam normal     Other Findings        Anesthesia Plan  ASA Score- 2     Anesthesia Type- regional with ASA Monitors  Additional Monitors:   Airway Plan:           Plan Factors-Exercise tolerance (METS): >4 METS  Chart reviewed  EKG reviewed  Existing labs reviewed  Patient summary reviewed  Patient is not a current smoker  Induction- intravenous  Postoperative Plan- Plan for postoperative opioid use  Informed Consent- Anesthetic plan and risks discussed with patient  I personally reviewed this patient with the CRNA  Discussed and agreed on the Anesthesia Plan with the CRNA  Noemi Cheng

## 2022-08-24 NOTE — ANESTHESIA PROCEDURE NOTES
Peripheral Block    Patient location during procedure: pre-op  Start time: 8/24/2022 7:21 AM  Reason for block: at surgeon's request and post-op pain management  Staffing  Performed: Anesthesiologist   Anesthesiologist: Jocelyne Sena MD  Preanesthetic Checklist  Completed: patient identified, IV checked, site marked, risks and benefits discussed, surgical consent, monitors and equipment checked, pre-op evaluation and timeout performed  Peripheral Block  Patient position: supine  Prep: ChloraPrep  Patient monitoring: heart rate, continuous pulse ox, frequent blood pressure checks and cardiac monitor  Block type: supraclavicular  Laterality: left  Injection technique: single-shot  Procedures: ultrasound guided, Ultrasound guidance required for the procedure to increase accuracy and safety of medication placement and decrease risk of complications    Ultrasound permanent image savedropivacaine (NAROPIN) 0 5 % - Perineural   25 mL - 8/24/2022 7:21:00 AM  midazolam (VERSED) 2 mg/2 mL - Intravenous   2 mg - 8/24/2022 7:18:00 AM  Needle  Needle type: Stimuplex   Needle gauge: 22 G  Needle length: 10 cm  Needle localization: ultrasound guidance  Assessment  Injection assessment: incremental injection, local visualized surrounding nerve on ultrasound, negative aspiration for heme and no paresthesia on injection  Paresthesia pain: none  Heart rate change: no  Slow fractionated injection: yes  Post-procedure:  site cleaned  patient tolerated the procedure well with no immediate complications

## 2022-08-25 ENCOUNTER — TELEPHONE (OUTPATIENT)
Dept: OBGYN CLINIC | Facility: CLINIC | Age: 42
End: 2022-08-25

## 2022-08-25 NOTE — TELEPHONE ENCOUNTER
Called pt's wife back with Dr Lili Ortiz instructions for pain relief  She verbalized understanding

## 2022-08-25 NOTE — TELEPHONE ENCOUNTER
Pt's wife called asking for something stronger for pt's pain  Stated I would send a message to the provider and get back to her  She verbalized understanding

## 2022-08-30 PROBLEM — E87.6 HYPOKALEMIA: Status: ACTIVE | Noted: 2022-08-30

## 2022-08-31 ENCOUNTER — TELEPHONE (OUTPATIENT)
Dept: NEPHROLOGY | Facility: CLINIC | Age: 42
End: 2022-08-31

## 2022-08-31 ENCOUNTER — HOSPITAL ENCOUNTER (OUTPATIENT)
Dept: RADIOLOGY | Facility: HOSPITAL | Age: 42
Discharge: HOME | End: 2022-08-31
Attending: ORTHOPAEDIC SURGERY
Payer: COMMERCIAL

## 2022-08-31 ENCOUNTER — OFFICE VISIT (OUTPATIENT)
Dept: OBGYN CLINIC | Facility: CLINIC | Age: 42
End: 2022-08-31

## 2022-08-31 DIAGNOSIS — S52.002A CLOSED FRACTURE OF PROXIMAL END OF LEFT ULNA, UNSPECIFIED FRACTURE MORPHOLOGY, INITIAL ENCOUNTER: Primary | ICD-10-CM

## 2022-08-31 DIAGNOSIS — S52.002A CLOSED FRACTURE OF PROXIMAL END OF LEFT ULNA, UNSPECIFIED FRACTURE MORPHOLOGY, INITIAL ENCOUNTER: ICD-10-CM

## 2022-08-31 PROCEDURE — 73080 X-RAY EXAM OF ELBOW: CPT

## 2022-08-31 PROCEDURE — 99024 POSTOP FOLLOW-UP VISIT: CPT | Performed by: ORTHOPAEDIC SURGERY

## 2022-08-31 NOTE — PATIENT INSTRUCTIONS
You may shower now and let water/soap gently wash over area  No scrubbing or soaking incision  We'll have you start therapy to work on motion    No strengthening until 6 weeks postop

## 2022-08-31 NOTE — TELEPHONE ENCOUNTER
Called to reschedule 8/31 no show appt with [facility]  No answer, and the patient's voicemail has not been set up  Letter sent

## 2022-09-06 ENCOUNTER — EVALUATION (OUTPATIENT)
Dept: PHYSICAL THERAPY | Facility: MEDICAL CENTER | Age: 42
End: 2022-09-06
Payer: COMMERCIAL

## 2022-09-06 DIAGNOSIS — S52.002D CLOSED FRACTURE OF PROXIMAL END OF LEFT ULNA WITH ROUTINE HEALING, UNSPECIFIED FRACTURE MORPHOLOGY, SUBSEQUENT ENCOUNTER: Primary | ICD-10-CM

## 2022-09-06 PROCEDURE — 97161 PT EVAL LOW COMPLEX 20 MIN: CPT

## 2022-09-06 NOTE — PROGRESS NOTES
PT Evaluation     Today's date: 2022  Patient name: Nico Torrez  : 1980  MRN: [id number]  Referring provider: Hayley Wren PA-C  Dx:   Encounter Diagnosis     ICD-10-CM    1  Closed fracture of proximal end of left ulna with routine healing, unspecified fracture morphology, subsequent encounter  S52 002D Ambulatory Referral to Physical Therapy       Start Time: 945  Stop Time: 1504  Total time in clinic (min): 30 minutes    Assessment  Assessment details: Patient is a 38 y o  male reporting to therapy with the referring diagnosis of closed fracture of proximal end of left ulna, unspecified fracture morphology  Patient is post ORIF with date of surgery being 22  Upon exam, patient presents with elbow pain/soreness and deficits in strength, elbow AROM,elbow PROM, endurance, and muscle flexibility which interferes with their ability to perform ADLs  This includes performing lifting, bathing, and cooking type activities  Elizabeth Franco did have edema present today with joint line circumferential measurements  He was educated on proper use of elevation and ice throughout each day to assist with this  He is a good candidate for therapy in order to address his stated deficits and improve his function so they can return to all activities  The patient was educated on the technique and use of self PROM at home to help with stiffness and ROM until his next follow up session  This was displayed for him and he was educated on keeping degree of motions within his limits  Positive prognostic factors include good attitude toward therapy and good reception of information today  Negative prognostic factors include traumatic mechanism of injury and need for ORIF          Impairments: abnormal or restricted ROM, abnormal movement, activity intolerance, impaired physical strength, lacks appropriate home exercise program and pain with function    Symptom irritability: moderateUnderstanding of Dx/Px/POC: good   Prognosis: good    Goals  STG  -Patient will be IND with basic level HEP within 4 weeks in order to make improvements at home   - Patient will have a decrease in 3 points on the NPRS within 4 weeks in order to improve quality of life  - Patient will have 90% of elbow PROM within 4 weeks in order to improve function      LTG  -Patient will be IND with an advanced level HEP within 8 weeks in order to make improvements at home   - Patient will have a decrease in 6 points on the NPRS within 6 weeks in order to improve quality of life  - Patient will have 90% of elbow AROM within 8 weeks in order to improve function  - Patient will reach his FOTO score goal within 8 weeks  -Begin strengthening exercises post doctors follow up and post 6 weeks surgery  -return to all ADLs within 12 weeks with minimal interference from his elbow    Plan  Plan details: Skilled PT to improve strength, ROM, flexibility, endurance, pain, and function  Patient would benefit from: PT eval and skilled physical therapy  Referral necessary: No  Planned modality interventions: TENS, electrical stimulation/Russian stimulation, biofeedback and cryotherapy  Planned therapy interventions: ADL retraining, balance/weight bearing training, joint mobilization, manual therapy, massage, muscle pump exercises, neuromuscular re-education, patient education, postural training, strengthening, stretching, therapeutic activities, therapeutic exercise, flexibility, functional ROM exercises, graded activity, graded exercise and home exercise program  Frequency: 2x week  Duration in weeks: 12  Plan of Care beginning date: 9/6/2022  Plan of Care expiration date: 11/29/2022  Treatment plan discussed with: patient        Subjective Evaluation    History of Present Illness  Mechanism of injury: Patient reports that he was at work on 8/15/22 when he slipped and fell and landed on his left side  He reports that he broke his elbow and needed surgery which he had on 8/24   Since this surgery he reports that his elbow is "so sore and stiff'  He has a hard time straightening and bending his elbow  He feels like "it is stuck"  He further states that he feels a little bit of swelling  He is not using any ice to help with the swelling  He feels "limited right now" and reports difficulty with washing his face, lifting his daughter, and cooking  His goals for therapy are to get "back to 100%"  He denies numbness or tingling symptoms  For work the patient is a             Not a recurrent problem   Quality of life: good    Pain  Current pain ratin  At best pain ratin  At worst pain ratin  Quality: discomfort and tight  Relieving factors: relaxation and rest  Progression: no change    Social Support    Employment status: working    Diagnostic Tests  X-ray: abnormal  Treatments  No previous or current treatments  Patient Goals  Patient goals for therapy: decreased pain, decreased edema, increased motion, increased strength, independence with ADLs/IADLs and return to sport/leisure activities          Objective     Tenderness     Left Elbow   Tenderness in the olecranon process  Left Wrist/Hand   Tenderness in the olecranon process       Additional Tenderness Details  General tenderness to lateral and posterior elbow with gentle palpation     Neurological Testing     Sensation     Elbow   Left Elbow   Intact: light touch    Right Elbow   Intact: light touch    Reflexes   Left   Taylor's reflex: negative    Right   Taylor's reflex: negative    Additional Neurological Details  Dermatomes WNL BL  Myotomes WNL no test for left sided elbow flexion, extension due to recent ORIF  Patient denies numbness and tingling    Active Range of Motion     Left Elbow   Flexion: 80 degrees with pain  Extension: -34 degrees with pain  Forearm supination: 85 degrees with pain  Forearm pronation: 80 degrees with pain    Passive Range of Motion     Left Elbow   Flexion: 87 degrees with pain  Extension: -22 degrees with pain  Forearm supination: 90 degrees   Forearm pronation: 80 degrees WFL    Strength/Myotome Testing     Right Elbow   Normal strength    Additional Strength Details  Defer left sided strength testing at this time, surgery 8/24    Swelling   Left Elbow Girth Measurements   Joint line: 28 cm    Right Elbow Girth Measurements   Joint line: 25 cm             Precautions:   Past Medical History:   Diagnosis Date    Anxiety     Hypertension     Liddle's syndrome     diagnosed 2 years ago     ORIF Left ulna 8/24/22   Per doctors orders no strengthening till 6 weeks post op (10/5)      Manuals 9/6            Elbow PROM             STM                                       Neuro Re-Ed                                                                                                        Ther Ex             Elbow self PROM patient educated on technique            Elbow AROM             Supine elbow extension stretch             Wrist AROM                                                                 Ther Activity                                       Gait Training                                       Modalities

## 2022-09-07 ENCOUNTER — OFFICE VISIT (OUTPATIENT)
Dept: PHYSICAL THERAPY | Facility: MEDICAL CENTER | Age: 42
End: 2022-09-07
Payer: COMMERCIAL

## 2022-09-07 DIAGNOSIS — S52.002D CLOSED FRACTURE OF PROXIMAL END OF LEFT ULNA WITH ROUTINE HEALING, UNSPECIFIED FRACTURE MORPHOLOGY, SUBSEQUENT ENCOUNTER: Primary | ICD-10-CM

## 2022-09-07 PROCEDURE — 97110 THERAPEUTIC EXERCISES: CPT

## 2022-09-07 PROCEDURE — 97140 MANUAL THERAPY 1/> REGIONS: CPT

## 2022-09-07 NOTE — PROGRESS NOTES
Daily Note     Today's date: 2022  Patient name: Maryann Rodriguez  : 1980  MRN: [id number]  Referring provider: Kimi Patricio PA-C  Dx:   Encounter Diagnosis     ICD-10-CM    1  Closed fracture of proximal end of left ulna with routine healing, unspecified fracture morphology, subsequent encounter  S52 002D        Start Time: 1402  Stop Time: 3354  Total time in clinic (min): 44 minutes    Subjective: Patient reports that his elbow is "sore and stiff"  He reports doing home exercises since his initial evaluation  Objective: See treatment diary below      Assessment: Tolerated treatment fair  A moderate amount of discomfort was present when the patient got to approximately 80-85 degrees of elbow flexion  Discomfort subsided with quick rest  Patient was educated on the appropriate intensity and degree of motion of elbow self PROM and low load extension stretching in order to keep discomfort minimal  Patient demonstrated fatigue post treatment and would benefit from continued PT      Plan: Continue per plan of care  Precautions:   Past Medical History:   Diagnosis Date    Anxiety     Hypertension     Liddle's syndrome     diagnosed 2 years ago     ORIF Left ulna 22  Per doctors orders no strengthening till 6 weeks post op (10/5)    PT 1:1 time 4816-9810  Manuals            Elbow PROM  TE 13'           STM  TE gentle anterior elbow 5'                                     Neuro Re-Ed                                                                                                        Ther Ex             Elbow self PROM patient educated on technique 20x 2" ea  Flex/ext/pron/sup           Elbow AROM  20x 2" ea  Flex/ext/sup/pron           Supine low load elbow extension stretch  2x2' elbow resting on towel roll           Wrist AROM   10x5" ea  Flex/ext Elbow slightly bent           Table slides for elbow flex/ext ROM  AAROM done over slide board, Forearm neutral 20x ea  Ther Activity                                       Gait Training                                       Modalities

## 2022-09-12 ENCOUNTER — OFFICE VISIT (OUTPATIENT)
Dept: PHYSICAL THERAPY | Facility: MEDICAL CENTER | Age: 42
End: 2022-09-12
Payer: COMMERCIAL

## 2022-09-12 DIAGNOSIS — S52.002D CLOSED FRACTURE OF PROXIMAL END OF LEFT ULNA WITH ROUTINE HEALING, UNSPECIFIED FRACTURE MORPHOLOGY, SUBSEQUENT ENCOUNTER: Primary | ICD-10-CM

## 2022-09-12 PROCEDURE — 97140 MANUAL THERAPY 1/> REGIONS: CPT

## 2022-09-12 PROCEDURE — 97110 THERAPEUTIC EXERCISES: CPT

## 2022-09-12 NOTE — PROGRESS NOTES
Daily Note     Today's date: 2022  Patient name: Shira Gibson  : 1980  MRN: [id number]  Referring provider: Gerard Sierra PA-C  Dx:   Encounter Diagnosis     ICD-10-CM    1  Closed fracture of proximal end of left ulna with routine healing, unspecified fracture morphology, subsequent encounter  S52 002D        Start Time: 1148  Stop Time: 1228  Total time in clinic (min): 40 minutes    Subjective: Patient reports no major changes since last session  Objective: See treatment diary below      Assessment: Tolerated treatment well  Progressed hold times of self PROM exercise  Patient reported less stiffness post session  He also demonstrated the ability to touch his face and contralateral underarm which he reports he has not been able to do in a while  Patient demonstrated fatigue post treatment and would benefit from continued PT  Plan: Continue per plan of care  Precautions:   Past Medical History:   Diagnosis Date    Anxiety     Hypertension     Liddle's syndrome     diagnosed 2 years ago     ORIF Left ulna 22  Per doctors orders no strengthening till 6 weeks post op (10/5)      Manuals           Elbow PROM  TE 13' TE 10'          STM  TE gentle anterior elbow 5' TE gentle anterior elbow 5'                                    Neuro Re-Ed                                                                                                        Ther Ex             Elbow self PROM patient educated on technique 20x 2" ea  Flex/ext/pron/sup 20x 5" ea  Flex/ext/pron/sup          Elbow AROM  20x 2" ea  Flex/ext/sup/pron 20x 2" ea  Flex/ext/sup/pron          Supine low load elbow extension stretch  2x2' elbow resting on towel roll 2'x2 elbow resting on towel           Wrist AROM   10x5" ea  Flex/ext Elbow slightly bent 2x10 5" ea  Flex/ext Elbow extended          Table slides for elbow flex/ext ROM  AAROM done over slide board, Forearm neutral 20x ea   AAROM done over slide board, Forearm neutral 20x 5" ea                                                   Ther Activity                                       Gait Training                                       Modalities

## 2022-09-14 ENCOUNTER — OFFICE VISIT (OUTPATIENT)
Dept: PHYSICAL THERAPY | Facility: MEDICAL CENTER | Age: 42
End: 2022-09-14
Payer: COMMERCIAL

## 2022-09-14 DIAGNOSIS — S52.002D CLOSED FRACTURE OF PROXIMAL END OF LEFT ULNA WITH ROUTINE HEALING, UNSPECIFIED FRACTURE MORPHOLOGY, SUBSEQUENT ENCOUNTER: Primary | ICD-10-CM

## 2022-09-14 PROCEDURE — 97140 MANUAL THERAPY 1/> REGIONS: CPT

## 2022-09-14 PROCEDURE — 97110 THERAPEUTIC EXERCISES: CPT

## 2022-09-14 NOTE — PROGRESS NOTES
Daily Note     Today's date: 2022  Patient name: Herson Balbuena  : 1980  MRN: [id number]  Referring provider: Lu Millan PA-C  Dx:   Encounter Diagnosis     ICD-10-CM    1  Closed fracture of proximal end of left ulna with routine healing, unspecified fracture morphology, subsequent encounter  S52 002D        Start Time: 1020  Stop Time: 1100  Total time in clinic (min): 40 minutes    Subjective: Patient reports he feels like his motion is improving  He reports that his elbow started to stiffen up again about 30 minutes after last therapy session  Objective: See treatment diary below      Assessment: Tolerated treatment well  Patient was educated today on the location of his fracture and surgery and how it effects his flexion and extension movement  Patient was also educated that he can do his home exercises, especially self PROM exercises, multiple times throughout the day to help with stiffness as long as he tolerates the increased frequency well  Patient demonstrated fatigue post treatment and would benefit from continued PT  Plan: Continue per plan of care  Precautions:   Past Medical History:   Diagnosis Date    Anxiety     Hypertension     Liddle's syndrome     diagnosed 2 years ago     ORIF Left ulna 22  Per doctors orders no strengthening till 6 weeks post op (10/5)      PT 1:1 time [id number]  Manuals          Elbow PROM  TE 13' TE 10' TE 15' focus more on flex/ext today         STM  TE gentle anterior elbow 5' TE gentle anterior elbow 5'                                    Neuro Re-Ed                                                                                                        Ther Ex             Elbow self PROM patient educated on technique 20x 2" ea  Flex/ext/pron/sup 20x 5" ea  Flex/ext/pron/sup 20x 5" ea  Flex/ext/pron/sup         Elbow AROM  20x 2" ea  Flex/ext/sup/pron 20x 2" ea  Flex/ext/sup/pron 20x 2" ea   Flex/ext/sup/pron Supine low load elbow extension stretch  2x2' elbow resting on towel roll 2'x2 elbow resting on towel  2'x2 elbow resting on towel          Wrist AROM   10x5" ea  Flex/ext Elbow slightly bent 2x10 5" ea  Flex/ext Elbow extended 2x10 5" ea  Flex/ext Elbow extended         Table slides for elbow flex/ext ROM  AAROM done over slide board, Forearm neutral 20x ea  AROM done over slide board, Forearm neutral 20x 5" ea  AROM done over slide board, Forearm neutral 20x 5" ea                                                  Ther Activity                                       Gait Training                                       Modalities

## 2022-09-19 ENCOUNTER — OFFICE VISIT (OUTPATIENT)
Dept: PHYSICAL THERAPY | Facility: MEDICAL CENTER | Age: 42
End: 2022-09-19
Payer: COMMERCIAL

## 2022-09-19 DIAGNOSIS — S52.002D CLOSED FRACTURE OF PROXIMAL END OF LEFT ULNA WITH ROUTINE HEALING, UNSPECIFIED FRACTURE MORPHOLOGY, SUBSEQUENT ENCOUNTER: Primary | ICD-10-CM

## 2022-09-19 PROCEDURE — 97110 THERAPEUTIC EXERCISES: CPT

## 2022-09-19 PROCEDURE — 97140 MANUAL THERAPY 1/> REGIONS: CPT

## 2022-09-21 ENCOUNTER — TELEPHONE (OUTPATIENT)
Dept: OBGYN CLINIC | Facility: HOSPITAL | Age: 42
End: 2022-09-21

## 2022-09-21 ENCOUNTER — APPOINTMENT (OUTPATIENT)
Dept: PHYSICAL THERAPY | Facility: MEDICAL CENTER | Age: 42
End: 2022-09-21
Payer: COMMERCIAL

## 2022-09-22 ENCOUNTER — OFFICE VISIT (OUTPATIENT)
Dept: PHYSICAL THERAPY | Facility: MEDICAL CENTER | Age: 42
End: 2022-09-22
Payer: COMMERCIAL

## 2022-09-22 DIAGNOSIS — S52.002D CLOSED FRACTURE OF PROXIMAL END OF LEFT ULNA WITH ROUTINE HEALING, UNSPECIFIED FRACTURE MORPHOLOGY, SUBSEQUENT ENCOUNTER: Primary | ICD-10-CM

## 2022-09-22 PROCEDURE — 97110 THERAPEUTIC EXERCISES: CPT

## 2022-09-22 PROCEDURE — 97140 MANUAL THERAPY 1/> REGIONS: CPT

## 2022-09-22 NOTE — PROGRESS NOTES
Daily Note     Today's date: 2022  Patient name: Maryann Rodriguez  : 1980  MRN: [id number]  Referring provider: Kimi Patricio PA-C  Dx:   Encounter Diagnosis     ICD-10-CM    1  Closed fracture of proximal end of left ulna with routine healing, unspecified fracture morphology, subsequent encounter  S52 002D        Start Time: 1530  Stop Time: 1615  Total time in clinic (min): 45 minutes    Subjective: Patient reports no change in status since last session  Objective: See treatment diary below      Assessment: Patient able to achieve approximately 90 degrees of elbow flexion today before onset of soreness  Soreness dissipates with rest  Overall he continues to tolerate his ROM exercises well  Patient demonstrated fatigue post treatment and would benefit from continued PT  Plan: Continue per plan of care  Precautions:   Past Medical History:   Diagnosis Date    Anxiety     Hypertension     Liddle's syndrome     diagnosed 2 years ago     ORIF Left ulna 22  Per doctors orders no strengthening till 6 weeks post op (10/5)      PT 1:1 time [id number]  Manuals        Elbow PROM  TE 13' TE 10' TE 15' focus more on flex/ext today TE 15' flex/ext TE 15' flex/ext       STM  TE gentle anterior elbow 5' TE gentle anterior elbow 5'                                    Neuro Re-Ed                                                                                                        Ther Ex             Elbow self PROM patient educated on technique 20x 2" ea  Flex/ext/pron/sup 20x 5" ea  Flex/ext/pron/sup 20x 5" ea  Flex/ext/pron/sup 20x 5" ea  Flex/ext/pron/sup 20x 5" ea  Flex/ext/pron/sup       Elbow AROM  20x 2" ea  Flex/ext/sup/pron 20x 2" ea  Flex/ext/sup/pron 20x 2" ea  Flex/ext/sup/pron 20x 2" ea  Flex/ext/sup/pron 20x 2" ea   Flex/ext/sup/pron       Supine low load elbow extension stretch  2x2' elbow resting on towel roll 2'x2 elbow resting on towel  2'x2 elbow resting on towel  2'x2 elbow resting on towel  2'x1 elbow resting on towel        Wrist AROM   10x5" ea  Flex/ext Elbow slightly bent 2x10 5" ea  Flex/ext Elbow extended 2x10 5" ea  Flex/ext Elbow extended 10x10" ea  Flex/ext Elbow extended 10x10" ea  Flex/ext Elbow extended       Table slides for elbow flex/ext ROM  AAROM done over slide board, Forearm neutral 20x ea  AROM done over slide board, Forearm neutral 20x 5" ea  AROM done over slide board, Forearm neutral 20x 5" ea  AROM done over slide board, Forearm neutral 20x 5" ea  AROM done over slide board, Forearm neutral 20x 5" ea         Finger ladder      x10 x10                                 Ther Activity                                       Gait Training                                       Modalities

## 2022-09-26 ENCOUNTER — OFFICE VISIT (OUTPATIENT)
Dept: PHYSICAL THERAPY | Facility: MEDICAL CENTER | Age: 42
End: 2022-09-26
Payer: COMMERCIAL

## 2022-09-26 DIAGNOSIS — S52.002D CLOSED FRACTURE OF PROXIMAL END OF LEFT ULNA WITH ROUTINE HEALING, UNSPECIFIED FRACTURE MORPHOLOGY, SUBSEQUENT ENCOUNTER: Primary | ICD-10-CM

## 2022-09-26 PROCEDURE — 97110 THERAPEUTIC EXERCISES: CPT

## 2022-09-26 PROCEDURE — 97140 MANUAL THERAPY 1/> REGIONS: CPT

## 2022-09-26 NOTE — PROGRESS NOTES
Daily Note     Today's date: 2022  Patient name: Eli Mayorga  : 1980  MRN: [id number]  Referring provider: Sherman Dewitt PA-C  Dx:   Encounter Diagnosis     ICD-10-CM    1  Closed fracture of proximal end of left ulna with routine healing, unspecified fracture morphology, subsequent encounter  S52 002D        Start Time: 1100  Stop Time: 1145  Total time in clinic (min): 45 minutes    Subjective: Patient reports pain is getting better but stiffness remains  Overall his motion has been better  Objective: See treatment diary below      Assessment: Tolerated interventions well today  Implemented pully exercise to further assist with regaining elbow range of motion  Patient demonstrated fatigue post treatment and would benefit from continued PT  Range of motion exercises will be continue and progressed as tolerated until patient is cleared for light strengthening  Plan: Continue per plan of care  Precautions:   Past Medical History:   Diagnosis Date    Anxiety     Hypertension     Liddle's syndrome     diagnosed 2 years ago     ORIF Left ulna 22  Per doctors orders no strengthening till 6 weeks post op (10/5)      PT 1:1 time 6534-5397  Manuals       Elbow PROM  TE 13' TE 10' TE 15' focus more on flex/ext today TE 15' flex/ext TE 15' flex/ext TE 15' flex/ext      STM  TE gentle anterior elbow 5' TE gentle anterior elbow 5'                                    Neuro Re-Ed                                                                                                        Ther Ex             Elbow self PROM patient educated on technique 20x 2" ea  Flex/ext/pron/sup 20x 5" ea  Flex/ext/pron/sup 20x 5" ea  Flex/ext/pron/sup 20x 5" ea  Flex/ext/pron/sup 20x 5" ea  Flex/ext/pron/sup 20x 5" ea  Flex/ext/pron/sup      Elbow AROM  20x 2" ea  Flex/ext/sup/pron 20x 2" ea  Flex/ext/sup/pron 20x 2" ea  Flex/ext/sup/pron 20x 2" ea  Flex/ext/sup/pron 20x 2" ea  Flex/ext/sup/pron 20x 2" ea  Flex/ext/sup/pron      Supine low load elbow extension stretch  2x2' elbow resting on towel roll 2'x2 elbow resting on towel  2'x2 elbow resting on towel  2'x2 elbow resting on towel  2'x1 elbow resting on towel  2'x1 elbow resting on towel       Wrist AROM   10x5" ea  Flex/ext Elbow slightly bent 2x10 5" ea  Flex/ext Elbow extended 2x10 5" ea  Flex/ext Elbow extended 10x10" ea  Flex/ext Elbow extended 10x10" ea  Flex/ext Elbow extended 10x10" ea  Flex/ext Elbow extended      Table slides for elbow flex/ext ROM  AAROM done over slide board, Forearm neutral 20x ea  AROM done over slide board, Forearm neutral 20x 5" ea  AROM done over slide board, Forearm neutral 20x 5" ea  AROM done over slide board, Forearm neutral 20x 5" ea  AROM done over slide board, Forearm neutral 20x 5" ea  AROM done over slide board, Forearm neutral 20x 5" ea        Finger ladder      x10 x10 x10      pullies       3'                   Ther Activity                                       Gait Training                                       Modalities

## 2022-09-28 ENCOUNTER — APPOINTMENT (OUTPATIENT)
Dept: PHYSICAL THERAPY | Facility: MEDICAL CENTER | Age: 42
End: 2022-09-28
Payer: COMMERCIAL

## 2022-10-03 ENCOUNTER — OFFICE VISIT (OUTPATIENT)
Dept: PHYSICAL THERAPY | Facility: MEDICAL CENTER | Age: 42
End: 2022-10-03
Payer: COMMERCIAL

## 2022-10-03 DIAGNOSIS — S52.002D CLOSED FRACTURE OF PROXIMAL END OF LEFT ULNA WITH ROUTINE HEALING, UNSPECIFIED FRACTURE MORPHOLOGY, SUBSEQUENT ENCOUNTER: Primary | ICD-10-CM

## 2022-10-03 PROCEDURE — 97110 THERAPEUTIC EXERCISES: CPT

## 2022-10-03 PROCEDURE — 97140 MANUAL THERAPY 1/> REGIONS: CPT

## 2022-10-03 NOTE — PROGRESS NOTES
Daily Note     Today's date: 10/3/2022  Patient name: Mau Engel  : 1980  MRN: [id number]  Referring provider: Marcia Ashford PA-C  Dx:   Encounter Diagnosis     ICD-10-CM    1  Closed fracture of proximal end of left ulna with routine healing, unspecified fracture morphology, subsequent encounter  S52 002D        Start Time: 1100  Stop Time: 1142  Total time in clinic (min): 42 minutes    Subjective: Patient reports tightness and stiffness has been getting better  Objective: See treatment diary below      Assessment: Patient displays improved flexion ROM today based on observation of movements  He completes all range of motion exercises without pain  Patient demonstrated fatigue post treatment and would benefit from continued PT  Patient has his six week post op appointment on 10/5  Increased reps of finger ladder and pullie exercise performed today  Plan: Continue per plan of care  Progress note next session  Patient has his six week post op visit on 10/5 with his referring provider  Precautions:   Past Medical History:   Diagnosis Date    Anxiety     Hypertension     Liddle's syndrome     diagnosed 2 years ago     ORIF Left ulna 22  Per doctors orders no strengthening till 6 weeks post op (10/5)        Manuals 9/6 9/7 9/12 9/14 9/19 9/22 9/26 10/3     Elbow PROM  TE 13' TE 10' TE 15' focus more on flex/ext today TE 15' flex/ext TE 15' flex/ext TE 15' flex/ext TE 15' flex/ext     STM  TE gentle anterior elbow 5' TE gentle anterior elbow 5'                                    Neuro Re-Ed                                       Bicep curls             tricep extensions             therabar wrist flexion/ext             Hammer pronation/supination              strengthening             Ther Ex             Elbow self PROM patient educated on technique 20x 2" ea  Flex/ext/pron/sup 20x 5" ea  Flex/ext/pron/sup 20x 5" ea  Flex/ext/pron/sup 20x 5" ea  Flex/ext/pron/sup 20x 5" ea  Flex/ext/pron/sup 20x 5" ea  Flex/ext/pron/sup np today      Elbow AROM  20x 2" ea  Flex/ext/sup/pron 20x 2" ea  Flex/ext/sup/pron 20x 2" ea  Flex/ext/sup/pron 20x 2" ea  Flex/ext/sup/pron 20x 2" ea  Flex/ext/sup/pron 20x 2" ea  Flex/ext/sup/pron 20x 2" ea  Flex/ext/sup/pron     Supine low load elbow extension stretch  2x2' elbow resting on towel roll 2'x2 elbow resting on towel  2'x2 elbow resting on towel  2'x2 elbow resting on towel  2'x1 elbow resting on towel  2'x1 elbow resting on towel  2'x1 elbow resting on towel      Wrist AROM   10x5" ea  Flex/ext Elbow slightly bent 2x10 5" ea  Flex/ext Elbow extended 2x10 5" ea  Flex/ext Elbow extended 10x10" ea  Flex/ext Elbow extended 10x10" ea  Flex/ext Elbow extended 10x10" ea  Flex/ext Elbow extended 10x10" ea  Flex/ext Elbow extended     Table slides for elbow flex/ext ROM  AAROM done over slide board, Forearm neutral 20x ea  AROM done over slide board, Forearm neutral 20x 5" ea  AROM done over slide board, Forearm neutral 20x 5" ea  AROM done over slide board, Forearm neutral 20x 5" ea  AROM done over slide board, Forearm neutral 20x 5" ea  AROM done over slide board, Forearm neutral 20x 5" ea  AROM done over slide board, Forearm neutral 20x 5" ea       Finger ladder      x10 x10 x10 2x10     pullies       3' 5'                  Ther Activity                                       Gait Training                                       Modalities

## 2022-10-05 ENCOUNTER — HOSPITAL ENCOUNTER (OUTPATIENT)
Dept: RADIOLOGY | Facility: HOSPITAL | Age: 42
Discharge: HOME | End: 2022-10-05
Attending: ORTHOPAEDIC SURGERY
Payer: COMMERCIAL

## 2022-10-05 ENCOUNTER — APPOINTMENT (OUTPATIENT)
Dept: PHYSICAL THERAPY | Facility: MEDICAL CENTER | Age: 42
End: 2022-10-05

## 2022-10-05 ENCOUNTER — OFFICE VISIT (OUTPATIENT)
Dept: OBGYN CLINIC | Facility: CLINIC | Age: 42
End: 2022-10-05

## 2022-10-05 VITALS
SYSTOLIC BLOOD PRESSURE: 128 MMHG | DIASTOLIC BLOOD PRESSURE: 74 MMHG | WEIGHT: 156 LBS | RESPIRATION RATE: 16 BRPM | OXYGEN SATURATION: 100 % | HEIGHT: 69 IN | BODY MASS INDEX: 23.11 KG/M2 | HEART RATE: 49 BPM

## 2022-10-05 DIAGNOSIS — Z48.89 AFTERCARE FOLLOWING SURGERY: ICD-10-CM

## 2022-10-05 DIAGNOSIS — Z48.89 AFTERCARE FOLLOWING SURGERY: Primary | ICD-10-CM

## 2022-10-05 PROCEDURE — 73080 X-RAY EXAM OF ELBOW: CPT

## 2022-10-05 PROCEDURE — 99024 POSTOP FOLLOW-UP VISIT: CPT | Performed by: ORTHOPAEDIC SURGERY

## 2022-10-05 NOTE — PROGRESS NOTES
[address] [facility] [address]  [phone]       Lenice Sacks  [id number]  1980    ORTHOPAEDIC SURGERY OUTPATIENT NOTE  10/5/2022      HISTORY:  38 y o  male who presents to the office today 6 weeks s/p left elbow olecranon ORIF, DOS 8/24/2022  He states that he is doing well postoperatively  He states that his pain is well controlled and rates his pain as a 3-4/10  He has been complaint with formal physical therapy and notes improvements      Past Medical History:   Diagnosis Date    Anxiety     Hypertension     Liddle's syndrome     diagnosed 2 years ago       Past Surgical History:   Procedure Laterality Date    CARDIAC CATHETERIZATION  05/31/2018    OH OPEN TX ULNAR FRACTURE PROX END Left 8/24/2022    Procedure: OPEN REDUCTION W/ INTERNAL FIXATION (ORIF) ELBOW- left olecranon and all associated reconstructive procedure;  Surgeon: Shane Torres;  Location: [facility];  Service: Orthopedics       Social History     Socioeconomic History    Marital status: Single     Spouse name: Not on file    Number of children: Not on file    Years of education: Not on file    Highest education level: Not on file   Occupational History    Not on file   Tobacco Use    Smoking status: Never Smoker    Smokeless tobacco: Never Used   Vaping Use    Vaping Use: Never used   Substance and Sexual Activity    Alcohol use: Never     Comment: none per minesh    Drug use: Yes     Types: Marijuana     Comment: every two three weeks    Sexual activity: Yes     Partners: Female   Other Topics Concern    Not on file   Social History Narrative    · Most recent tobacco use screening:   10-      · Do you currently or have you served in the [employer]:   No      · Live alone or with others:   with others      · Exercise level:   Occasional      · General stress level:   Medium      · Diet:   Regular      · Caffeine intake:   None      · Last modified by [id number]   07-, 03:32      Social Determinants of Health     Financial Resource Strain: Not on file   Food Insecurity: Not on file   Transportation Needs: Not on file   Physical Activity: Not on file   Stress: Not on file   Social Connections: Not on file   Intimate Partner Violence: Not on file   Housing Stability: Not on file       Family History   Problem Relation Age of Onset    Hypertension Mother     Hypertension Maternal Grandmother     Diabetes Neg Hx     Coronary artery disease Neg Hx         Patient's Medications   New Prescriptions    No medications on file   Previous Medications    AMILORIDE 5 MG TABLET    Take 2 tablets (10 mg total) by mouth 2 (two) times a day PATIENT TAKES ONE TABLET BID    ASHWAGANDHA PO    Take by mouth daily    CARVEDILOL (COREG) 12 5 MG TABLET    TAKE 1 TABLET BY MOUTH TWICE A DAY WITH FOOD    POTASSIUM CHLORIDE ER 20 MEQ TBCR    TAKE 1 TABLET BY MOUTH TWICE A DAY   Modified Medications    No medications on file   Discontinued Medications    No medications on file       No Known Allergies     /74 (BP Location: Left arm, Patient Position: Sitting)   Pulse (!) 49   Resp 16   Ht 5' 9" (1 753 m)   Wt 70 8 kg (156 lb)   SpO2 100%   BMI 23 04 kg/m²      REVIEW OF SYSTEMS:  Constitutional: Negative  HEENT: Negative  Respiratory: Negative  Skin: Negative  Neurological: Negative  Psychiatric/Behavioral: Negative  Musculoskeletal: Negative except for that mentioned in the HPI  /74 (BP Location: Left arm, Patient Position: Sitting)   Pulse (!) 49   Resp 16   Ht 5' 9" (1 753 m)   Wt 70 8 kg (156 lb)   SpO2 100%   BMI 23 04 kg/m²   Gen: Alert and oriented to person, place, time  HEENT: EOMI, eyes clear, moist mucus membranes, hearing intact  Respiratory: Bilateral chest rise   No audible wheezing found  Cardiovascular: Regular Rate and Rhythm  Abdomen: soft nontender/nondistended     PHYSICAL EXAM:  Left Elbow  Well-healed surgical scar  No erythema, warmth or signs of infection  Flexion 136 degrees  Extension 10 degrees  Full supination and pronation  Neurovascularly intact    IMAGING:  X-rays performed in the office today of his left elbow demonstrates stable orthopedic hardware with no signs of hardware failure  There is interval signs of healing but fracture is healing slower than expected  ASSESSMENT AND PLAN:  38 y o  male 6 weeks s/p left elbow olecranon ORIF, DOS 8/24/2022    X-rays were reviewed with the patient at today's visit  He is doing well postoperatively  I discussed with the patient that he should take OTC vitamin D and calcium to help with bone healing  He should continue his efforts with formal physical therapy and may begin strengthening at this time  He rates his SANE score as a 70% and VAS score as a 3-4/10  At this time, he will remain out of work  A work note was provided to the patient at today's visit  I will follow-up with him in 6 weeks for a clinical re-evaluation and repeat left elbow x-rays  Understood and had no further questions        Scribe Attestation    I,:  Emerald Cordon am acting as a scribe while in the presence of the attending physician :       I,:  Shelly Mejia personally performed the services described in this documentation    as scribed in my presence :

## 2022-10-05 NOTE — LETTER
October 5, 2022     Patient: Olegario Singh  YOB: 1980  Date of Visit: 10/5/2022      To Whom it May Concern:    Mike Jeannine Solorzano is under my professional care  Mike was seen in my office on 10/5/2022  Mike will remain out of work until cleared  If you have any questions or concerns, please don't hesitate to call           Sincerely,          Farnaz Lora        CC: No Recipients

## 2022-10-06 ENCOUNTER — OFFICE VISIT (OUTPATIENT)
Dept: PHYSICAL THERAPY | Facility: MEDICAL CENTER | Age: 42
End: 2022-10-06
Payer: COMMERCIAL

## 2022-10-06 ENCOUNTER — TELEPHONE (OUTPATIENT)
Dept: OBGYN CLINIC | Facility: HOSPITAL | Age: 42
End: 2022-10-06

## 2022-10-06 DIAGNOSIS — S52.002D CLOSED FRACTURE OF PROXIMAL END OF LEFT ULNA WITH ROUTINE HEALING, UNSPECIFIED FRACTURE MORPHOLOGY, SUBSEQUENT ENCOUNTER: Primary | ICD-10-CM

## 2022-10-06 PROCEDURE — 97140 MANUAL THERAPY 1/> REGIONS: CPT

## 2022-10-06 PROCEDURE — 97112 NEUROMUSCULAR REEDUCATION: CPT

## 2022-10-06 PROCEDURE — 97110 THERAPEUTIC EXERCISES: CPT

## 2022-10-06 NOTE — TELEPHONE ENCOUNTER
Caller: Lizbeth    Doctor: Birtha Siemens    Reason for call: check on if patient came in for his appt    Call back#:

## 2022-10-06 NOTE — PROGRESS NOTES
PT ReEvaluation/Daily Note    Today's date: 10/6/2022  Patient name: Tessa Tipton  : 1980  MRN: [id number]  Referring provider: Jean-Paul Guerin PA-C  Dx:   Encounter Diagnosis     ICD-10-CM    1  Closed fracture of proximal end of left ulna with routine healing, unspecified fracture morphology, subsequent encounter  S52 002D        Start Time: 0800  Stop Time: 0845  Total time in clinic (min): 45 minutes    Assessment  Assessment details: ReEvaluation 10/6  Patient reports to therapy for a reevaluation on his ninth visit to date over the course of the last month  The patient is currently six weeks post op  Upon examination today, he displays steady progress in elbow passive and active range of motion, decreases in self reported pain levels, and improvements in function according to foto outcomes scores  These stated improvements have led to more independence with basic tasks including bathing and reaching his under arms  However he remains limited with moderated to higher level activities such as lifting  The patient will continue to benefit from skilled physical therapy to further progress toward return to full level of function  He shows good progress in therapy goals  He recently had his follow up doctors visit and was cleared for strengthening exercises  A few light strengthening exercises were introduced to him today and were tolerated well  They will be progressed moving forward as the patient tolerates  He was educated on possibility of mild delayed onset muscle soreness following new exercises       Impairments: abnormal or restricted ROM, abnormal movement, activity intolerance, impaired physical strength, lacks appropriate home exercise program and pain with function    Symptom irritability: moderateUnderstanding of Dx/Px/POC: good   Prognosis: good    Goals  STG  -Patient will be IND with basic level HEP within 4 weeks in order to make improvements at home - met   - Patient will have a decrease in 3 points on the NPRS within 4 weeks in order to improve quality of life- met   - Patient will have 90% of elbow PROM within 4 weeks in order to improve function- met       LTG  -Patient will be IND with an advanced level HEP within 8 weeks in order to make improvements at home - not met   - Patient will have a decrease in 6 points on the NPRS within 6 weeks in order to improve quality of life- not met   - Patient will have 90% of elbow AROM within 8 weeks in order to improve function- met   - Patient will reach his FOTO score goal within 8 weeks- not met   -Begin strengthening exercises post doctors follow up and post 6 weeks surgery- met   -return to all ADLs within 12 weeks with minimal interference from his elbow- not met     Plan  Plan details: Skilled PT to improve strength, ROM, flexibility, endurance, pain, and function  Patient would benefit from: PT eval and skilled physical therapy  Referral necessary: No  Planned modality interventions: TENS, electrical stimulation/Russian stimulation, biofeedback and cryotherapy  Planned therapy interventions: ADL retraining, balance/weight bearing training, joint mobilization, manual therapy, massage, muscle pump exercises, neuromuscular re-education, patient education, postural training, strengthening, stretching, therapeutic activities, therapeutic exercise, flexibility, functional ROM exercises, graded activity, graded exercise and home exercise program  Frequency: 2x week  Duration in weeks: 12  Plan of Care beginning date: 9/6/2022  Plan of Care expiration date: 11/29/2022  Treatment plan discussed with: patient        Subjective Evaluation    History of Present Illness  Mechanism of injury: ReEvaluation 10/6  Patient reports that since starting therapy his motions overall have gotten better  He states he is now able to reach his under arm and his face  He is also able to now do more basic activities with this arm around the home   Pain and soreness has "improved a lot" however he still does get his usual stiffness  Patient also still reports some difficulty with lifting and getting dressed  His goals for therapy moving forward are to continue to "get back as close to 100% as he can"  Overall he states he is about 70% improved since starting therapy  Not a recurrent problem   Quality of life: good    Pain  Current pain ratin  At best pain ratin  At worst pain ratin  Quality: discomfort and tight  Relieving factors: relaxation and rest  Progression: no change    Social Support    Employment status: working    Diagnostic Tests  X-ray: abnormal  Treatments  No previous or current treatments  Patient Goals  Patient goals for therapy: decreased pain, decreased edema, increased motion, increased strength, independence with ADLs/IADLs and return to sport/leisure activities          Objective     Tenderness     Left Elbow   Tenderness in the olecranon process  Left Wrist/Hand   Tenderness in the olecranon process  Neurological Testing     Additional Neurological Details  No changes reported since initial evaluation     Active Range of Motion     Left Elbow   Flexion: 129 degrees   Extension: -10 degrees   Forearm supination: WFL  Forearm pronation: WFL    Passive Range of Motion     Left Elbow   Flexion: 130 degrees   Extension: -8 degrees   Forearm supination: WFL  Forearm pronation: [facility]    Strength/Myotome Testing     Left Elbow   Flexion: 3  Extension: 3  Forearm supination: 3  Forearm pronation: 3    Right Elbow   Normal strength    Additional Strength Details  MMT based on AROM    Swelling   Left Elbow Girth Measurements   Joint line: 25 cm    Right Elbow Girth Measurements   Joint line: 25 cm             Precautions:   Past Medical History:   Diagnosis Date    Anxiety     Hypertension     Liddle's syndrome     diagnosed 2 years ago     ORIF Left ulna 22   Per doctors orders no strengthening till 6 weeks post op (10/5)    Manuals 9/6 9/7 9/12 9/14 9/19 9/22 9/26 10/3 10/6    Elbow PROM  TE 13' TE 10' TE 15' focus more on flex/ext today TE 15' flex/ext TE 15' flex/ext TE 15' flex/ext TE 15' flex/ext TE 15' flex/ext    STM  TE gentle anterior elbow 5' TE gentle anterior elbow 5'                                    Neuro Re-Ed             Elbow isometrics         2x10 2" ea  flex/ext/pron/sup     strengthening         Green 2x10 2"    Wrist flex/ext         2x10 1" 1# ea  Bicep curls             tricep extensions             therabar wrist flexion/ext             Hammer pronation/supination                          Ther Ex             Elbow self PROM patient educated on technique 20x 2" ea  Flex/ext/pron/sup 20x 5" ea  Flex/ext/pron/sup 20x 5" ea  Flex/ext/pron/sup 20x 5" ea  Flex/ext/pron/sup 20x 5" ea  Flex/ext/pron/sup 20x 5" ea  Flex/ext/pron/sup np today      Elbow AROM  20x 2" ea  Flex/ext/sup/pron 20x 2" ea  Flex/ext/sup/pron 20x 2" ea  Flex/ext/sup/pron 20x 2" ea  Flex/ext/sup/pron 20x 2" ea  Flex/ext/sup/pron 20x 2" ea  Flex/ext/sup/pron 20x 2" ea  Flex/ext/sup/pron     Supine low load elbow extension stretch  2x2' elbow resting on towel roll 2'x2 elbow resting on towel  2'x2 elbow resting on towel  2'x2 elbow resting on towel  2'x1 elbow resting on towel  2'x1 elbow resting on towel  2'x1 elbow resting on towel      Wrist AROM   10x5" ea  Flex/ext Elbow slightly bent 2x10 5" ea  Flex/ext Elbow extended 2x10 5" ea  Flex/ext Elbow extended 10x10" ea  Flex/ext Elbow extended 10x10" ea  Flex/ext Elbow extended 10x10" ea  Flex/ext Elbow extended 10x10" ea  Flex/ext Elbow extended     Table slides for elbow flex/ext ROM  AAROM done over slide board, Forearm neutral 20x ea  AROM done over slide board, Forearm neutral 20x 5" ea  AROM done over slide board, Forearm neutral 20x 5" ea  AROM done over slide board, Forearm neutral 20x 5" ea  AROM done over slide board, Forearm neutral 20x 5" ea   AROM done over slide board, Forearm neutral 20x 5" ea  AROM done over slide board, Forearm neutral 20x 5" ea  AROM done over slide board, Forearm neutral 10x 5" ea      Finger ladder      x10 x10 x10 2x10 2x10    pullies       3' 5' 5'                 Ther Activity                                       Gait Training                                       Modalities

## 2022-10-10 ENCOUNTER — OFFICE VISIT (OUTPATIENT)
Dept: PHYSICAL THERAPY | Facility: MEDICAL CENTER | Age: 42
End: 2022-10-10
Payer: COMMERCIAL

## 2022-10-10 DIAGNOSIS — S52.002D CLOSED FRACTURE OF PROXIMAL END OF LEFT ULNA WITH ROUTINE HEALING, UNSPECIFIED FRACTURE MORPHOLOGY, SUBSEQUENT ENCOUNTER: Primary | ICD-10-CM

## 2022-10-10 PROCEDURE — 97112 NEUROMUSCULAR REEDUCATION: CPT

## 2022-10-10 PROCEDURE — 97110 THERAPEUTIC EXERCISES: CPT

## 2022-10-10 PROCEDURE — 97140 MANUAL THERAPY 1/> REGIONS: CPT

## 2022-10-10 NOTE — PROGRESS NOTES
Daily Note     Today's date: 10/10/2022  Patient name: Federico Gutierrez  : 1980  MRN: [id number]  Referring provider: Jazmin Moses PA-C  Dx:   Encounter Diagnosis     ICD-10-CM    1  Closed fracture of proximal end of left ulna with routine healing, unspecified fracture morphology, subsequent encounter  S52 002D        Start Time: 1015  Stop Time: 1056  Total time in clinic (min): 41 minutes    Subjective: Patient reports stiffness is still present but it is getting better day by day  No complaints reported following last session  Objective: See treatment diary below      Assessment:  Continued implementation of light forearm and elbow strengthening exercises  Patient tolerated them all well and was appropriately challenged  Mild muscle fatigue was present post session  Patient demonstrated fatigue post treatment and would benefit from continued PT  Plan: Continue per plan of care  Precautions:   Past Medical History:   Diagnosis Date   • Anxiety    • Hypertension    • Liddle's syndrome     diagnosed 2 years ago     ORIF Left ulna 22  Manuals 9/6 9/7 9/12 9/14 9/19 9/22 9/26 10/3 10/6 10/10   Elbow PROM  TE 13' TE 10' TE 15' focus more on flex/ext today TE 15' flex/ext TE 15' flex/ext TE 15' flex/ext TE 15' flex/ext TE 15' flex/ext TE 10' flex/ext   STM  TE gentle anterior elbow 5' TE gentle anterior elbow 5'                                    Neuro Re-Ed             Elbow isometrics         2x10 2" ea  flex/ext/pron/sup 2x10 2" ea  flex/ext/pron/sup    strengthening         Green 2x10 2" Green 2x10 2"   Wrist flex/ext         2x10 1" 1# ea  2x10 1" 1# ea  therabar wrist flexion/ext             therabar pronation/supination          Red 20x 2" ea  Ther Ex             Elbow self PROM patient educated on technique 20x 2" ea  Flex/ext/pron/sup 20x 5" ea  Flex/ext/pron/sup 20x 5" ea  Flex/ext/pron/sup 20x 5" ea  Flex/ext/pron/sup 20x 5" ea   Flex/ext/pron/sup 20x 5" ea  Flex/ext/pron/sup np today      Elbow AROM  20x 2" ea  Flex/ext/sup/pron 20x 2" ea  Flex/ext/sup/pron 20x 2" ea  Flex/ext/sup/pron 20x 2" ea  Flex/ext/sup/pron 20x 2" ea  Flex/ext/sup/pron 20x 2" ea  Flex/ext/sup/pron 20x 2" ea  Flex/ext/sup/pron     Supine low load elbow extension stretch  2x2' elbow resting on towel roll 2'x2 elbow resting on towel  2'x2 elbow resting on towel  2'x2 elbow resting on towel  2'x1 elbow resting on towel  2'x1 elbow resting on towel  2'x1 elbow resting on towel      Wrist AROM   10x5" ea  Flex/ext Elbow slightly bent 2x10 5" ea  Flex/ext Elbow extended 2x10 5" ea  Flex/ext Elbow extended 10x10" ea  Flex/ext Elbow extended 10x10" ea  Flex/ext Elbow extended 10x10" ea  Flex/ext Elbow extended 10x10" ea  Flex/ext Elbow extended     Table slides for elbow flex/ext ROM  AAROM done over slide board, Forearm neutral 20x ea  AROM done over slide board, Forearm neutral 20x 5" ea  AROM done over slide board, Forearm neutral 20x 5" ea  AROM done over slide board, Forearm neutral 20x 5" ea  AROM done over slide board, Forearm neutral 20x 5" ea  AROM done over slide board, Forearm neutral 20x 5" ea  AROM done over slide board, Forearm neutral 20x 5" ea  AROM done over slide board, Forearm neutral 10x 5" ea  AROM done over slide board, Forearm neutral 10x 5" ea     Finger ladder      x10 x10 x10 2x10 2x10 x10   pullies       3' 5' 5' 5'   Bicep curls          2x10 1# arm supinated and neutral   triceps extensions          2x10 YTB   Ther Activity                                       Gait Training                                       Modalities

## 2022-10-12 ENCOUNTER — APPOINTMENT (OUTPATIENT)
Dept: PHYSICAL THERAPY | Facility: MEDICAL CENTER | Age: 42
End: 2022-10-12

## 2022-10-18 ENCOUNTER — OFFICE VISIT (OUTPATIENT)
Dept: PHYSICAL THERAPY | Facility: MEDICAL CENTER | Age: 42
End: 2022-10-18
Payer: COMMERCIAL

## 2022-10-18 DIAGNOSIS — S52.002D CLOSED FRACTURE OF PROXIMAL END OF LEFT ULNA WITH ROUTINE HEALING, UNSPECIFIED FRACTURE MORPHOLOGY, SUBSEQUENT ENCOUNTER: Primary | ICD-10-CM

## 2022-10-18 PROCEDURE — 97110 THERAPEUTIC EXERCISES: CPT

## 2022-10-18 PROCEDURE — 97112 NEUROMUSCULAR REEDUCATION: CPT

## 2022-10-18 NOTE — PROGRESS NOTES
Daily Note     Today's date: 10/18/2022  Patient name: José Miguel Doan  : 1980  MRN: [id number]  Referring provider: Tobias Hugo PA-C  Dx:   Encounter Diagnosis     ICD-10-CM    1  Closed fracture of proximal end of left ulna with routine healing, unspecified fracture morphology, subsequent encounter  S52 002D        Start Time: 930  Stop Time: 1015  Total time in clinic (min): 45 minutes    Subjective: Patient offers no new complaints at this time  No soreness following last session  Objective: See treatment diary below      Assessment:  Patient tolerated exercises last session well without reports of discomfort or soreness following their completion  Light strengthening activities were further progressed today  Patient demonstrated fatigue post treatment and would benefit from continued PT  Fatigue subsided with brief 60 second recovery breaks  Plan: Continue per plan of care  Precautions:   Past Medical History:   Diagnosis Date   • Anxiety    • Hypertension    • Liddle's syndrome     diagnosed 2 years ago     ORIF Left ulna 22  PT 1:1 time [id number]  Manuals 10/18         1010   Elbow PROM          TE 10' flex/ext   STM                                       Neuro Re-Ed             Elbow isometrics np today         2x10 2" ea  flex/ext/pron/sup    strengthening Blue 2x10 2"         Green 2x10 2"   Wrist flex/ext 2x10 1" 2# ea  2x10 1" 1# ea  therabar wrist flexion/ext Red 2x10            therabar pronation/supination  20x 2" ea  1# cuff weight on end         Red 20x 2" ea  Radial/ulnar deviation   20x 2" ea  1# cuff weight on end            Ther Ex             Elbow self PROM             Elbow AROM             Supine low load elbow extension stretch             Wrist AROM              Table slides for elbow flex/ext ROM AROM done over slide board, Forearm neutral 20x 5" ea  AROM done over slide board, Forearm neutral 10x 5" ea     Finger ladder  x10 x10   pullies 5'         5'   Bicep curls 2x10 3# arm supinated and neutral         2x10 1# arm supinated and neutral   triceps extensions 2x10 YTB         2x10 YTB   Ther Activity                                       Gait Training                                       Modalities

## 2022-10-20 ENCOUNTER — OFFICE VISIT (OUTPATIENT)
Dept: PHYSICAL THERAPY | Facility: MEDICAL CENTER | Age: 42
End: 2022-10-20
Payer: COMMERCIAL

## 2022-10-20 DIAGNOSIS — S52.002D CLOSED FRACTURE OF PROXIMAL END OF LEFT ULNA WITH ROUTINE HEALING, UNSPECIFIED FRACTURE MORPHOLOGY, SUBSEQUENT ENCOUNTER: Primary | ICD-10-CM

## 2022-10-20 PROCEDURE — 97110 THERAPEUTIC EXERCISES: CPT

## 2022-10-20 PROCEDURE — 97112 NEUROMUSCULAR REEDUCATION: CPT

## 2022-10-20 NOTE — PROGRESS NOTES
Daily Note     Today's date: 10/20/2022  Patient name: Fitz Ceja  : 1980  MRN: [id number]  Referring provider: Erma Madrigal PA-C  Dx:   Encounter Diagnosis     ICD-10-CM    1  Closed fracture of proximal end of left ulna with routine healing, unspecified fracture morphology, subsequent encounter  S52 002D        Start Time: 933  Stop Time: 1015  Total time in clinic (min): 42 minutes    Subjective: Patient reports having some muscle soreness following last session but it has since subsided  Objective: See treatment diary below      Assessment:  Continued strengthening exercises with good toleration to all interventions  Patient's discomfort was minimal  Able to add standing upright wall push ups  Patient demonstrated fatigue post treatment and would benefit from continued PT  Patient will continue to be progressed as tolerated  Plan: Continue per plan of care  Precautions:   Past Medical History:   Diagnosis Date   • Anxiety    • Hypertension    • Liddle's syndrome     diagnosed 2 years ago     ORIF Left ulna 22  Manuals 10/18 10/20        10/10   Elbow PROM          TE 10' flex/ext   STM                                       Neuro Re-Ed             Elbow isometrics np today np today        2x10 2" ea  flex/ext/pron/sup    strengthening Blue 2x10 2" Blue 2x10 2"        Green 2x10 2"   Wrist flex/ext 2x10 1" 2# ea  2x10 1" 2# ea  2x10 1" 1# ea  therabar wrist flexion/ext Red 2x10 green 2x10           therabar pronation/supination  20x 2" ea  1# cuff weight on end 20x 2" ea  1# cuff weight on end        Red 20x 2" ea     Radial/ulnar deviation   20x 2" ea  with therabar and 1# cuff weight on end 20x 2" ea  with therabar and 1# cuff weight on end           Ther Ex             Elbow self PROM             Elbow AROM             Supine low load elbow extension stretch             Wrist AROM              Table slides for elbow flex/ext ROM AROM done over slide board, Forearm neutral 20x 5" ea  AROM done over slide board, Forearm neutral 20x 5" ea  AROM done over slide board, Forearm neutral 10x 5" ea     Finger ladder  x10 2x10        x10   pullies 5' 5'        5'   Bicep curls 2x10 3# arm supinated and neutral 2x10 3# arm supinated and neutral        2x10 1# arm supinated and neutral   triceps extensions 2x10 YTB 2x10 YTB        2x10 YTB   Overhead press  2x10 2#           Wall push ups  10x standing straight upward            Ther Activity                                       Gait Training                                       Modalities

## 2022-10-24 ENCOUNTER — OFFICE VISIT (OUTPATIENT)
Dept: PHYSICAL THERAPY | Facility: MEDICAL CENTER | Age: 42
End: 2022-10-24
Payer: COMMERCIAL

## 2022-10-24 DIAGNOSIS — S52.002D CLOSED FRACTURE OF PROXIMAL END OF LEFT ULNA WITH ROUTINE HEALING, UNSPECIFIED FRACTURE MORPHOLOGY, SUBSEQUENT ENCOUNTER: Primary | ICD-10-CM

## 2022-10-24 PROCEDURE — 97110 THERAPEUTIC EXERCISES: CPT

## 2022-10-24 PROCEDURE — 97112 NEUROMUSCULAR REEDUCATION: CPT

## 2022-10-24 NOTE — PROGRESS NOTES
Daily Note     Today's date: 10/24/2022  Patient name: Parrish Rodarte  : 1980  MRN: [id number]  Referring provider: Luz Moore PA-C  Dx:   Encounter Diagnosis     ICD-10-CM    1  Closed fracture of proximal end of left ulna with routine healing, unspecified fracture morphology, subsequent encounter  S52 002D        Start Time: 1016  Stop Time: 1056  Total time in clinic (min): 40 minutes    Subjective: Patient reports no complaints following last exercise session  Soreness has been minimal        Objective: See treatment diary below      Assessment:  Small progressions made in resistance of strengthening exercises  This was tolerated well with occasional need for brief recovery breaks throughout the session  Patient demonstrated fatigue post treatment and would benefit from continued PT  Plan: Continue per plan of care  Precautions:   Past Medical History:   Diagnosis Date   • Anxiety    • Hypertension    • Liddle's syndrome     diagnosed 2 years ago     ORIF Left ulna 22  Manuals 10/18 10/20 10/24       10/10   Elbow PROM          TE 10' flex/ext   STM                                       Neuro Re-Ed             Elbow isometrics np today np today        2x10 2" ea  flex/ext/pron/sup    strengthening Blue 2x10 2" Blue 2x10 2" Blue 2x10 2"       Green 2x10 2"   Wrist flex/ext 2x10 1" 2# ea  2x10 1" 2# ea  2x10 1" 3# ea  2x10 1" 1# ea  therabar wrist flexion/ext Red 2x10 green 2x10 green 2x10          therabar pronation/supination  20x 2" ea  1# cuff weight on end 20x 2" ea  1# cuff weight on end 20x 2" ea  1 5# cuff weight on end       Red 20x 2" ea     Radial/ulnar deviation   20x 2" ea  with therabar and 1# cuff weight on end 20x 2" ea  with therabar and 1# cuff weight on end 20x 2" ea  with therabar and 1 5# cuff weight on end          Ther Ex             Elbow self PROM             Elbow AROM             Supine low load elbow extension stretch             Wrist AROM              Table slides for elbow flex/ext ROM AROM done over slide board, Forearm neutral 20x 5" ea  AROM done over slide board, Forearm neutral 20x 5" ea  AROM done over slide board, Forearm neutral 20x 5" ea  AROM done over slide board, Forearm neutral 10x 5" ea     Finger ladder  x10 2x10 2x10       x10   pullies 5' 5' 5'       5'   Bicep curls 2x10 3# arm supinated and neutral 2x10 3# arm supinated and neutral 2x10 3# arm supinated and neutral       2x10 1# arm supinated and neutral   triceps extensions 2x10 YTB 2x10 YTB 2x10 YTB       2x10 YTB   Overhead press  2x10 2# 2x10 2#          Wall push ups  10x standing straight upward  10x standing straight upward           Ther Activity                                       Gait Training                                       Modalities

## 2022-10-26 ENCOUNTER — OFFICE VISIT (OUTPATIENT)
Dept: PHYSICAL THERAPY | Facility: MEDICAL CENTER | Age: 42
End: 2022-10-26
Payer: COMMERCIAL

## 2022-10-26 DIAGNOSIS — S52.002D CLOSED FRACTURE OF PROXIMAL END OF LEFT ULNA WITH ROUTINE HEALING, UNSPECIFIED FRACTURE MORPHOLOGY, SUBSEQUENT ENCOUNTER: Primary | ICD-10-CM

## 2022-10-26 PROCEDURE — 97110 THERAPEUTIC EXERCISES: CPT

## 2022-10-26 PROCEDURE — 97112 NEUROMUSCULAR REEDUCATION: CPT

## 2022-10-26 NOTE — PROGRESS NOTES
Daily Note     Today's date: 10/26/2022  Patient name: Don Ronquillo  : 1980  MRN: [id number]  Referring provider: Lalito Atkins PA-C  Dx:   Encounter Diagnosis     ICD-10-CM    1  Closed fracture of proximal end of left ulna with routine healing, unspecified fracture morphology, subsequent encounter  S52 002D        Start Time: 1015  Stop Time: 1100  Total time in clinic (min): 45 minutes    Subjective: Patient reports no complaints or changes at this time  Exercises are all going well  Objective: See treatment diary below      Assessment:  Patient reports no significant complaints with his elbow and has been adhering to all of his strengthening exercises  He reports good tolerance to these exercises without complaints of discomfort or soreness  At this time patient believes he can transition to his home exercise program  He was provided with a written handout of all exercises to refer to if needed  His chart will be left open for the next two weeks and he will follow up with therapy as needed over that time  If he is not heard from he will be discharged  Patient is in agreement with this plan  He will be following up with his referring provider on   Plan: Transition to HEP and follow up with therapy if needed until his doctors follow up  Precautions:   Past Medical History:   Diagnosis Date   • Anxiety    • Hypertension    • Liddle's syndrome     diagnosed 2 years ago     ORIF Left ulna 22  Manuals 10/18 10/20 10/24 10/26      10/10   Elbow PROM          TE 10' flex/ext   STM                                       Neuro Re-Ed             Elbow isometrics np today np today        2x10 2" ea  flex/ext/pron/sup    strengthening Blue 2x10 2" Blue 2x10 2" Blue 2x10 2" Black 2x10 2"      Green 2x10 2"   Wrist flex/ext 2x10 1" 2# ea  2x10 1" 2# ea  2x10 1" 3# ea  2x10 1" 4# ea  2x10 1" 1# ea     therabar wrist flexion/ext Red 2x10 green 2x10 green 2x10 green 2x10 therabar pronation/supination  20x 2" ea  1# cuff weight on end 20x 2" ea  1# cuff weight on end 20x 2" ea  1 5# cuff weight on end 20x 2" ea  1 5# cuff weight on end      Red 20x 2" ea  Radial/ulnar deviation   20x 2" ea  with therabar and 1# cuff weight on end 20x 2" ea  with therabar and 1# cuff weight on end 20x 2" ea  with therabar and 1 5# cuff weight on end 20x 2" ea  with therabar and 1 5# cuff weight on end         Ther Ex             Elbow self PROM             Elbow AROM             Supine low load elbow extension stretch             Wrist AROM              Table slides for elbow flex/ext ROM AROM done over slide board, Forearm neutral 20x 5" ea  AROM done over slide board, Forearm neutral 20x 5" ea  AROM done over slide board, Forearm neutral 20x 5" ea  AROM done over slide board, Forearm neutral 20x 5" ea  AROM done over slide board, Forearm neutral 10x 5" ea     Finger ladder  x10 2x10 2x10 2x10      x10   pullies 5' 5' 5' 6'      5'   Bicep curls 2x10 3# arm supinated and neutral 2x10 3# arm supinated and neutral 2x10 3# arm supinated and neutral 2x10 4# arm supinated and neutral      2x10 1# arm supinated and neutral   triceps extensions 2x10 YTB 2x10 YTB 2x10 YTB 2x10 YTB      2x10 YTB   Overhead press  2x10 2# 2x10 2# 2x10 3#         Wall push ups  10x standing straight upward  10x standing straight upward  2x10 standing straight upward          Ther Activity                                       Gait Training                                       Modalities

## 2022-11-02 ENCOUNTER — HOSPITAL ENCOUNTER (EMERGENCY)
Facility: HOSPITAL | Age: 42
Discharge: HOME | End: 2022-11-02
Attending: EMERGENCY MEDICINE

## 2022-11-02 VITALS
DIASTOLIC BLOOD PRESSURE: 110 MMHG | WEIGHT: 150 LBS | SYSTOLIC BLOOD PRESSURE: 183 MMHG | RESPIRATION RATE: 14 BRPM | BODY MASS INDEX: 22.15 KG/M2 | HEART RATE: 105 BPM | TEMPERATURE: 97.9 F | OXYGEN SATURATION: 96 %

## 2022-11-02 DIAGNOSIS — E87.6 HYPOKALEMIA: ICD-10-CM

## 2022-11-02 DIAGNOSIS — G50.0 TRIGEMINAL NEURALGIA OF LEFT SIDE OF FACE: Primary | ICD-10-CM

## 2022-11-02 DIAGNOSIS — R03.0 ELEVATED BLOOD PRESSURE READING: ICD-10-CM

## 2022-11-02 LAB
ANION GAP SERPL CALCULATED.3IONS-SCNC: 8 MMOL/L (ref 4–13)
BASOPHILS # BLD AUTO: 0.05 THOUSANDS/ÂΜL (ref 0–0.1)
BASOPHILS NFR BLD AUTO: 1 % (ref 0–1)
BUN SERPL-MCNC: 11 MG/DL (ref 5–25)
CALCIUM SERPL-MCNC: 9.2 MG/DL (ref 8.4–10.2)
CHLORIDE SERPL-SCNC: 101 MMOL/L (ref 96–108)
CO2 SERPL-SCNC: 29 MMOL/L (ref 21–32)
CREAT SERPL-MCNC: 1.16 MG/DL (ref 0.6–1.3)
EOSINOPHIL # BLD AUTO: 0.25 THOUSAND/ÂΜL (ref 0–0.61)
EOSINOPHIL NFR BLD AUTO: 3 % (ref 0–6)
ERYTHROCYTE [DISTWIDTH] IN BLOOD BY AUTOMATED COUNT: 12.9 % (ref 11.6–15.1)
GFR SERPL CREATININE-BSD FRML MDRD: 76 ML/MIN/1.73SQ M
GLUCOSE SERPL-MCNC: 104 MG/DL (ref 65–140)
HCT VFR BLD AUTO: 44.9 % (ref 36.5–49.3)
HGB BLD-MCNC: 14.8 G/DL (ref 12–17)
IMM GRANULOCYTES # BLD AUTO: 0.04 THOUSAND/UL (ref 0–0.2)
IMM GRANULOCYTES NFR BLD AUTO: 0 % (ref 0–2)
LYMPHOCYTES # BLD AUTO: 0.75 THOUSANDS/ÂΜL (ref 0.6–4.47)
LYMPHOCYTES NFR BLD AUTO: 8 % (ref 14–44)
MCH RBC QN AUTO: 29.1 PG (ref 26.8–34.3)
MCHC RBC AUTO-ENTMCNC: 33 G/DL (ref 31.4–37.4)
MCV RBC AUTO: 88 FL (ref 82–98)
MONOCYTES # BLD AUTO: 1.37 THOUSAND/ÂΜL (ref 0.17–1.22)
MONOCYTES NFR BLD AUTO: 15 % (ref 4–12)
NEUTROPHILS # BLD AUTO: 6.99 THOUSANDS/ÂΜL (ref 1.85–7.62)
NEUTS SEG NFR BLD AUTO: 73 % (ref 43–75)
NRBC BLD AUTO-RTO: 0 /100 WBCS
PLATELET # BLD AUTO: 207 THOUSANDS/UL (ref 149–390)
PMV BLD AUTO: 9 FL (ref 8.9–12.7)
POTASSIUM SERPL-SCNC: 3.2 MMOL/L (ref 3.5–5.3)
RBC # BLD AUTO: 5.09 MILLION/UL (ref 3.88–5.62)
SODIUM SERPL-SCNC: 138 MMOL/L (ref 135–147)
WBC # BLD AUTO: 9.45 THOUSAND/UL (ref 4.31–10.16)

## 2022-11-02 RX ORDER — GABAPENTIN 100 MG/1
100 CAPSULE ORAL ONCE
Status: COMPLETED | OUTPATIENT
Start: 2022-11-02 | End: 2022-11-02

## 2022-11-02 RX ORDER — GABAPENTIN 100 MG/1
100 CAPSULE ORAL 3 TIMES DAILY PRN
Qty: 15 CAPSULE | Refills: 0 | Status: SHIPPED | OUTPATIENT
Start: 2022-11-02 | End: 2022-11-07

## 2022-11-02 RX ORDER — POTASSIUM CHLORIDE 20 MEQ/1
40 TABLET, EXTENDED RELEASE ORAL ONCE
Status: COMPLETED | OUTPATIENT
Start: 2022-11-02 | End: 2022-11-02

## 2022-11-02 RX ADMIN — POTASSIUM CHLORIDE 40 MEQ: 1500 TABLET, EXTENDED RELEASE ORAL at 19:53

## 2022-11-02 RX ADMIN — GABAPENTIN 100 MG: 100 CAPSULE ORAL at 19:57

## 2022-11-02 NOTE — DISCHARGE INSTRUCTIONS
Follow up with neurology and dentistry as scheduled  You can try the prescribed medication as directed when your facial pain occurs  Please return to the emergency department if you develop worsening symptoms, severe pain, drooping of your face, numbness, weakness, vision changes, fever, or anything else concerning to you  Follow up with your primary care physician regarding your elevated blood pressure

## 2022-11-02 NOTE — ED PROVIDER NOTES
History  Chief Complaint   Patient presents with   • Headache     Extreme headache left jaw and left ear ongoing for a while  Sometimes weeks between episodes  Pain started this morning and has not subsided  Took tylenol and oxy without relief  Pt describes pain as stabbing and shooting     38year-old-male with history of hypertension, anxiety who presents for left sided facial and jaw pain  Patient reports that he has had several episodes of this pain over the past year  The pain originates around the anterior portion of his left ear and radiates through the left jaw  He described the pain as an electric shock sensation  The pain is very brief only lasting a few seconds before resolving  Today he has had the pain numerous times which prompted him to seek evaluation  He currently does not have the pain  He tried tylenol and oxycodone today without relief  Of note, he was diagnosed with trigeminal neuralgia by his primary care physician who referred him to neurology  He has an appointment with them in January  He has not had any numbness, vision changes, hearing changes, facial drooping  Prior to Admission Medications   Prescriptions Last Dose Informant Patient Reported? Taking?    AMILoride 5 mg tablet 11/1/2022 at Unknown time Self No Yes   Sig: Take 2 tablets (10 mg total) by mouth 2 (two) times a day PATIENT TAKES ONE TABLET BID   ASHWAGANDHA PO 11/1/2022 at Unknown time Self Yes Yes   Sig: Take by mouth daily   Potassium Chloride ER 20 MEQ TBCR 11/1/2022 at Unknown time Self No Yes   Sig: TAKE 1 TABLET BY MOUTH TWICE A DAY   carvedilol (COREG) 12 5 mg tablet 11/1/2022 at Unknown time Self No Yes   Sig: TAKE 1 TABLET BY MOUTH TWICE A DAY WITH FOOD      Facility-Administered Medications: None       Past Medical History:   Diagnosis Date   • Anxiety    • Hypertension    • Liddle's syndrome     diagnosed 2 years ago       Past Surgical History:   Procedure Laterality Date   • CARDIAC CATHETERIZATION 05/31/2018   • [place] OPEN TX ULNAR FRACTURE PROX END Left 8/24/2022    Procedure: OPEN REDUCTION W/ INTERNAL FIXATION (ORIF) ELBOW- left olecranon and all associated reconstructive procedure;  Surgeon: Kojo Bowser;  Location:  MAIN OR;  Service: Orthopedics       Family History   Problem Relation Age of Onset   • Hypertension Mother    • Hypertension Maternal Grandmother    • Diabetes Neg Hx    • Coronary artery disease Neg Hx      I have reviewed and agree with the history as documented  E-Cigarette/Vaping   • E-Cigarette Use Never User      E-Cigarette/Vaping Substances   • Nicotine No    • THC No    • CBD No    • Flavoring No    • Other No    • Unknown No      Social History     Tobacco Use   • Smoking status: Never Smoker   • Smokeless tobacco: Never Used   Vaping Use   • Vaping Use: Never used   Substance Use Topics   • Alcohol use: Never     Comment: none per minesh   • Drug use: Yes     Types: Marijuana     Comment: every two three weeks       Review of Systems   Constitutional: Negative for chills and fever  HENT: Negative for congestion, dental problem, facial swelling and trouble swallowing  Eyes: Negative for visual disturbance  Respiratory: Negative for cough, chest tightness and shortness of breath  Cardiovascular: Negative for chest pain and leg swelling  Gastrointestinal: Negative for abdominal pain, diarrhea, nausea and vomiting  Genitourinary: Negative for difficulty urinating and flank pain  Musculoskeletal: Negative for back pain and gait problem  Skin: Negative for pallor and rash  Neurological: Negative for syncope, weakness, light-headedness, numbness and headaches  All other systems reviewed and are negative  Physical Exam  Physical Exam  Vitals and nursing note reviewed  Constitutional:       General: He is not in acute distress  HENT:      Head: Normocephalic and atraumatic  Comments: No tenderness along the temporal region  No facial swelling       Left Ear: Tympanic membrane and ear canal normal       Nose: Nose normal       Mouth/Throat:      Mouth: Mucous membranes are moist       Pharynx: No oropharyngeal exudate or posterior oropharyngeal erythema  Comments: Normal dentition  Eyes:      Extraocular Movements: Extraocular movements intact  Pupils: Pupils are equal, round, and reactive to light  Cardiovascular:      Rate and Rhythm: Normal rate and regular rhythm  Heart sounds: No murmur heard  No friction rub  No gallop  Pulmonary:      Effort: Pulmonary effort is normal       Breath sounds: Normal breath sounds  No wheezing, rhonchi or rales  Abdominal:      General: There is no distension  Palpations: Abdomen is soft  Tenderness: There is no abdominal tenderness  Musculoskeletal:         General: No swelling or tenderness  Normal range of motion  Cervical back: Normal range of motion and neck supple  Skin:     General: Skin is warm and dry  Coloration: Skin is not pale  Findings: No rash  Neurological:      General: No focal deficit present  Mental Status: He is alert and oriented to person, place, and time  Comments: CN 2-12 intact  Strength and sensation intact to all extremities     Psychiatric:         Behavior: Behavior normal          Vital Signs  ED Triage Vitals [11/02/22 1838]   Temperature Pulse Respirations Blood Pressure SpO2   97 9 °F (36 6 °C) (!) 110 18 (!) 197/113 99 %      Temp Source Heart Rate Source Patient Position - Orthostatic VS BP Location FiO2 (%)   Oral Monitor Lying Left arm --      Pain Score       10 - Worst Possible Pain           Vitals:    11/02/22 1838 11/02/22 1953   BP: (!) 197/113 (!) 183/110   Pulse: (!) 110 105   Patient Position - Orthostatic VS: Lying          Visual Acuity      ED Medications  Medications   potassium chloride (K-DUR,KLOR-CON) CR tablet 40 mEq (40 mEq Oral Given 11/2/22 1953)   gabapentin (NEURONTIN) capsule 100 mg (100 mg Oral Given 11/2/22 1957)       Diagnostic Studies  Results Reviewed     Procedure Component Value Units Date/Time    Basic metabolic panel [[id number]]  (Abnormal) Collected: 11/02/22 1901    Lab Status: Final result Specimen: Blood from Arm, Left Updated: 11/02/22 1949     Sodium 138 mmol/L      Potassium 3 2 mmol/L      Chloride 101 mmol/L      CO2 29 mmol/L      ANION GAP 8 mmol/L      BUN 11 mg/dL      Creatinine 1 16 mg/dL      Glucose 104 mg/dL      Calcium 9 2 mg/dL      eGFR 76 ml/min/1 73sq m     Narrative:      [place] guidelines for Chronic Kidney Disease (CKD):   •  Stage 1 with normal or high GFR (GFR > 90 mL/min/1 73 square meters)  •  Stage 2 Mild CKD (GFR = 60-89 mL/min/1 73 square meters)  •  Stage 3A Moderate CKD (GFR = 45-59 mL/min/1 73 square meters)  •  Stage 3B Moderate CKD (GFR = 30-44 mL/min/1 73 square meters)  •  Stage 4 Severe CKD (GFR = 15-29 mL/min/1 73 square meters)  •  Stage 5 End Stage CKD (GFR <15 mL/min/1 73 square meters)  Note: GFR calculation is accurate only with a steady state creatinine    CBC and differential [[id number]]  (Abnormal) Collected: 11/02/22 1901    Lab Status: Final result Specimen: Blood from Arm, Left Updated: 11/02/22 1906     WBC 9 45 Thousand/uL      RBC 5 09 Million/uL      Hemoglobin 14 8 g/dL      Hematocrit 44 9 %      MCV 88 fL      MCH 29 1 pg      MCHC 33 0 g/dL      RDW 12 9 %      MPV 9 0 fL      Platelets 883 Thousands/uL      nRBC 0 /100 WBCs      Neutrophils Relative 73 %      Immat GRANS % 0 %      Lymphocytes Relative 8 %      Monocytes Relative 15 %      Eosinophils Relative 3 %      Basophils Relative 1 %      Neutrophils Absolute 6 99 Thousands/µL      Immature Grans Absolute 0 04 Thousand/uL      Lymphocytes Absolute 0 75 Thousands/µL      Monocytes Absolute 1 37 Thousand/µL      Eosinophils Absolute 0 25 Thousand/µL      Basophils Absolute 0 05 Thousands/µL                  No orders to display Procedures  Procedures         ED Course  ED Course as of 11/02/22 2339   Wed Nov 02, 2022   1908 CBC and differential(!)                               SBIRT 20yo+    Pinky Warebeto Most Recent Value   SBIRT (23 yo +)    In order to provide better care to our patients, we are screening all of our patients for alcohol and drug use  Would it be okay to ask you these screening questions? No Filed at: 11/02/2022 1910                    [facility]  Number of Diagnoses or Management Options  Elevated blood pressure reading: new and requires workup  Hypokalemia: new and requires workup  Trigeminal neuralgia of left side of face: new and requires workup  Diagnosis management comments: 40-year-old male who presents for left-sided facial pain  History and exam consistent with trigeminal neuralgia  No other concerning exam findings  Patient asymptomatic at this current time, offered treatment with carbamazepine, patient declining at this time given his lack of symptoms  Will check basic labs and initiate patient on gabapentin  Labs remarkable for mild hypokalemia which was orally repleted  Renal function normal   Patient noted to have hypertension but asymptomatic  Advised follow-up with primary care physician regarding elevated blood pressure reading as well as Neurology regarding his trigeminal neuralgia  Return precautions discussed           Amount and/or Complexity of Data Reviewed  Clinical lab tests: ordered and reviewed  Review and summarize past medical records: yes    Risk of Complications, Morbidity, and/or Mortality  Presenting problems: high  Diagnostic procedures: low  Management options: low    Patient Progress  Patient progress: stable      Disposition  Final diagnoses:   Trigeminal neuralgia of left side of face   Hypokalemia   Elevated blood pressure reading     Time reflects when diagnosis was documented in both MDM as applicable and the Disposition within this note     Time User Action Codes Description Comment    11/2/2022  7:23 PM Helena Haines Add [G50 0] Trigeminal neuralgia of left side of face     11/2/2022  7:52 PM Helena Haines Add [E87 6] Hypokalemia     11/2/2022  7:52 PM Helena Haines Modify [E87 6] Hypokalemia     11/2/2022  7:54 PM Helena Haines Add [R03 0] Elevated blood pressure reading       ED Disposition     ED Disposition   Discharge    Condition   Stable    Date/Time   Wed Nov 2, 2022  7:23 PM    Comment   Portia Gastelum discharge to home/self care  Follow-up Information     Follow up With Specialties Details Why Contact Info Additional Information    Titus Marquez MD Internal Medicine In 2 days  [address] [place] [address]  [address] [facility] [address]       Neurology [facility] Neurology  Or any neurologist [address] [place] [id number] [phone] Neurology [facility], [address] [place] [address], [address] [facility] [address]          Discharge Medication List as of 11/2/2022  7:55 PM      START taking these medications    Details   gabapentin (Neurontin) 100 mg capsule Take 1 capsule (100 mg total) by mouth 3 (three) times a day as needed (facial pain) for up to 5 days, Starting Wed 11/2/2022, Until Mon 11/7/2022 at 2359, Normal         CONTINUE these medications which have NOT CHANGED    Details   AMILoride 5 mg tablet Take 2 tablets (10 mg total) by mouth 2 (two) times a day PATIENT TAKES ONE TABLET BID, Starting Thu 3/24/2022, Until Wed 11/2/2022, Normal      ASHWAGANDHA PO Take by mouth daily, Historical Med      carvedilol (COREG) 12 5 mg tablet TAKE 1 TABLET BY MOUTH TWICE A DAY WITH FOOD, Normal      Potassium Chloride ER 20 MEQ TBCR TAKE 1 TABLET BY MOUTH TWICE A DAY, Normal             No discharge procedures on file      PDMP Review       Value Time User    PDMP Reviewed  Yes 8/1/2022  7:02 PM Shwetha Bhardwaj MD          ED Provider  Electronically Signed by           Chiquita Hou MD  11/02/22 1019

## 2022-11-16 ENCOUNTER — OFFICE VISIT (OUTPATIENT)
Dept: OBGYN CLINIC | Facility: CLINIC | Age: 42
End: 2022-11-16

## 2022-11-16 ENCOUNTER — HOSPITAL ENCOUNTER (OUTPATIENT)
Dept: RADIOLOGY | Facility: HOSPITAL | Age: 42
Discharge: HOME | End: 2022-11-16
Attending: ORTHOPAEDIC SURGERY

## 2022-11-16 VITALS
WEIGHT: 155.2 LBS | BODY MASS INDEX: 22.99 KG/M2 | HEART RATE: 98 BPM | HEIGHT: 69 IN | DIASTOLIC BLOOD PRESSURE: 113 MMHG | SYSTOLIC BLOOD PRESSURE: 165 MMHG

## 2022-11-16 DIAGNOSIS — Z48.89 AFTERCARE FOLLOWING SURGERY: ICD-10-CM

## 2022-11-16 DIAGNOSIS — Z48.89 AFTERCARE FOLLOWING SURGERY: Primary | ICD-10-CM

## 2022-11-16 NOTE — PROGRESS NOTES
[address] [facility] [address]  [phone]       Herson Balbuena  [id number]  1980    ORTHOPAEDIC SURGERY OUTPATIENT NOTE  11/16/2022      HISTORY:  38 y o  male  who presents today 12 weeks status post left elbow olecranon ORIF, date of surgery 08/24/2022  Patient states he is doing well  Denies any pain  He does feel his strength is getting better  He is currently physical therapy and tolerated sessions well  Overall he feels is 85% improved  He states his pain level today is a 1/10      Past Medical History:   Diagnosis Date   • Anxiety    • Hypertension    • Liddle's syndrome     diagnosed 2 years ago       Past Surgical History:   Procedure Laterality Date   • CARDIAC CATHETERIZATION  05/31/2018   • [place] OPEN TX ULNAR FRACTURE PROX END Left 8/24/2022    Procedure: OPEN REDUCTION W/ INTERNAL FIXATION (ORIF) ELBOW- left olecranon and all associated reconstructive procedure;  Surgeon: Georgina Hernandez;  Location:  MAIN OR;  Service: Orthopedics       Social History     Socioeconomic History   • Marital status: Single     Spouse name: Not on file   • Number of children: Not on file   • Years of education: Not on file   • Highest education level: Not on file   Occupational History   • Not on file   Tobacco Use   • Smoking status: Never   • Smokeless tobacco: Never   Vaping Use   • Vaping Use: Never used   Substance and Sexual Activity   • Alcohol use: Never     Comment: none per minesh   • Drug use: Yes     Types: Marijuana     Comment: every two three weeks   • Sexual activity: Yes     Partners: Female   Other Topics Concern   • Not on file   Social History Narrative    · Most recent tobacco use screening:   10-      · Do you currently or have you served in [employer]:   No      · Live alone or with others:   with others      · Exercise level:   Occasional      · General stress level:   Medium · Diet:   Regular      · Caffeine intake:   None      · Last modified by [id number]   07-, 03:32      Social Determinants of Health     Financial Resource Strain: Not on file   Food Insecurity: Not on file   Transportation Needs: Not on file   Physical Activity: Not on file   Stress: Not on file   Social Connections: Not on file   Intimate Partner Violence: Not on file   Housing Stability: Not on file       Family History   Problem Relation Age of Onset   • Hypertension Mother    • Hypertension Maternal Grandmother    • Diabetes Neg Hx    • Coronary artery disease Neg Hx         Patient's Medications   New Prescriptions    No medications on file   Previous Medications    AMILORIDE 5 MG TABLET    Take 2 tablets (10 mg total) by mouth 2 (two) times a day PATIENT TAKES ONE TABLET BID    ASHWAGANDHA PO    Take by mouth daily    CARVEDILOL (COREG) 12 5 MG TABLET    TAKE 1 TABLET BY MOUTH TWICE A DAY WITH FOOD    GABAPENTIN (NEURONTIN) 100 MG CAPSULE    Take 1 capsule (100 mg total) by mouth 3 (three) times a day as needed (facial pain) for up to 5 days    POTASSIUM CHLORIDE ER 20 MEQ TBCR    TAKE 1 TABLET BY MOUTH TWICE A DAY   Modified Medications    No medications on file   Discontinued Medications    No medications on file       No Known Allergies     BP (!) 165/113 (BP Location: Right arm, Patient Position: Sitting, Cuff Size: Adult)   Pulse 98   Ht 5' 9" (1 753 m) Comment: verbal  Wt 70 4 kg (155 lb 3 2 oz)   BMI 22 92 kg/m²      REVIEW OF SYSTEMS:  Constitutional: Negative  HEENT: Negative  Respiratory: Negative  Skin: Negative  Neurological: Negative  Psychiatric/Behavioral: Negative  Musculoskeletal: Negative except for that mentioned in the HPI        PHYSICAL EXAM:   Left elbow   Surgical incision well healed  No erythema   Extension : 5 degrees short of full extension   4/5 strengthening forward flexion   Full supination and pronation       IMAGING:  X-ray left elbow demonstrates status post ORIF, no evidence of hardware failure, well-healed proximal ulnar fracture, fracture line maintained    ASSESSMENT AND PLAN:  38 y o  male  12 weeks status post a left elbow olecranon ORIF, date of surgery 08/24/2022  Patient is doing well  He is continue physical therapy working on strengthening  Patient has no restrictions and is able to start working out again  He is aware it may take up to 6-12 months overall for his strength to improve  He is to follow-up as an as needed basis        Scribe Attestation    I,:  Sandi Melvin am acting as a scribe while in the presence of the attending physician :       I,:  David Cosme personally performed the services described in this documentation    as scribed in my presence :

## 2022-12-07 ENCOUNTER — TRANSCRIBE ORDERS (OUTPATIENT)
Dept: OBGYN CLINIC | Facility: CLINIC | Age: 42
End: 2022-12-07

## 2023-02-14 ENCOUNTER — TELEPHONE (OUTPATIENT)
Dept: NEUROLOGY | Facility: CLINIC | Age: 43
End: 2023-02-14

## 2023-02-15 ENCOUNTER — CONSULT (OUTPATIENT)
Dept: NEUROLOGY | Facility: CLINIC | Age: 43
End: 2023-02-15

## 2023-02-15 VITALS
DIASTOLIC BLOOD PRESSURE: 98 MMHG | WEIGHT: 160.2 LBS | TEMPERATURE: 98.2 F | SYSTOLIC BLOOD PRESSURE: 150 MMHG | HEART RATE: 98 BPM | HEIGHT: 69 IN | BODY MASS INDEX: 23.73 KG/M2

## 2023-02-15 DIAGNOSIS — G50.0 TRIGEMINAL NEURALGIA: Primary | ICD-10-CM

## 2023-02-15 DIAGNOSIS — H92.09 EAR PAIN: ICD-10-CM

## 2023-02-15 NOTE — PATIENT INSTRUCTIONS
Patient Instructions:  -please schedule your MRI brain/trigeminal  -please make an appointment to be seen by the ear, nose and throat specialists  -please reach out to the office if you have worsening pain and would like to be started on a medication

## 2023-02-15 NOTE — PROGRESS NOTES
[address] Neurology Consult  PATIENT:  Aiyana Guerrero  MRN:  [id number]  :  1980  DATE OF SERVICE:  2/15/2023  REFERRED BY: Hillary Rapp*  PMD: Vivian Mackay MD    Assessment/Plan:     Aiyana Guerrero is a very pleasant 38 y o  male with a past medical history that includes HTN who presents for evaluation of facial pain  Trigeminal vs glossopharyngeal neuralgia  -main distribution of pt's pain is more consistent with trigeminal neuralgia  However, he endorses significant pain involving the ear as well, which is less typical  -recommend ENT evaluation for exam given pt endorses pain deep within his L ear  -discussed possible preventative options  Pt declined preventative treatment at this time  Discussed starting preventative treatment in the future if symptoms become more frequent  -MRI brain trigeminal w/wo to r/o structural causes of his symptoms    CC:   Facial pain    History of Present Illness:     38 y o  male with a past medical history that includes HTN who presents for evaluation of facial pain  He states that his symptoms started over 1 year ago  Symptoms have occurred episodicaly over the past year  Describes a stabbing, sharp pain in the top of his L ear, inside of his L ear, and radiating to the V2/V3 distribution  These symptoms usually only last for a few minutes  Describes the pain as shooting, electric-like pain which almost feels like a needle  Denies any cutaneous or mechanical triggers  Denies any change in sensation over the affected areas  In this past week he has had 2 of these episodes  Denies any trauma, dental procedures, or other triggers prior to the onset of these episodes  He presented to the ED for his symptoms back in 2022  He was offered a trial of carbamazepine at that time but he declined  He states that he was seen by his dentist for these episodes but examination and xrays were unremarkable       Past Medical History:     Past Medical History:   Diagnosis Date   • Anxiety    • Hypertension    • Liddle's syndrome     diagnosed 2 years ago       Patient Active Problem List   Diagnosis   • Liddle's syndrome   • Trigeminal neuralgia   • Annual physical exam   • Other secondary hypertension   • Hypokalemia       Medications:      Current Outpatient Medications   Medication Sig Dispense Refill   • AMILoride 5 mg tablet Take 2 tablets (10 mg total) by mouth 2 (two) times a day PATIENT TAKES ONE TABLET  tablet 2   • ASHWAGANDHA PO Take by mouth daily     • carvedilol (COREG) 12 5 mg tablet TAKE 1 TABLET BY MOUTH TWICE A DAY WITH FOOD 180 tablet 2   • Potassium Chloride ER 20 MEQ TBCR TAKE 1 TABLET BY MOUTH TWICE A  tablet 1   • gabapentin (Neurontin) 100 mg capsule Take 1 capsule (100 mg total) by mouth 3 (three) times a day as needed (facial pain) for up to 5 days (Patient not taking: Reported on 2/15/2023) 15 capsule 0     No current facility-administered medications for this visit          Allergies:    No Known Allergies    Family History:     Family History   Problem Relation Age of Onset   • Hypertension Mother    • Hypertension Maternal Grandmother    • Diabetes Neg Hx    • Coronary artery disease Neg Hx        Social History:       Social History     Socioeconomic History   • Marital status: Single     Spouse name: Not on file   • Number of children: Not on file   • Years of education: Not on file   • Highest education level: Not on file   Occupational History   • Not on file   Tobacco Use   • Smoking status: Never   • Smokeless tobacco: Never   Vaping Use   • Vaping Use: Never used   Substance and Sexual Activity   • Alcohol use: Never     Comment: none per minesh   • Drug use: Yes     Types: Marijuana     Comment: every two three weeks   • Sexual activity: Yes     Partners: Female   Other Topics Concern   • Not on file   Social History Narrative    · Most recent tobacco use screening:   10-      · Do you currently or have you served in the [employer]:   No      · Live alone or with others:   with others      · Exercise level:   Occasional      · General stress level:   Medium      · Diet:   Regular      · Caffeine intake:   None      · Last modified by [id number]   07-, 03:32      Social Determinants of Health     Financial Resource Strain: Not on file   Food Insecurity: Not on file   Transportation Needs: Not on file   Physical Activity: Not on file   Stress: Not on file   Social Connections: Not on file   Intimate Partner Violence: Not on file   Housing Stability: Not on file         Objective:   /98 (BP Location: Left arm, Patient Position: Sitting, Cuff Size: Large)   Pulse 98   Temp 98 2 °F (36 8 °C)   Ht 5' 9" (1 753 m)   Wt 72 7 kg (160 lb 3 2 oz)   BMI 23 66 kg/m²     General: Patient is not in any acute/apparent distress, well nourished, well developed and cooperative  HEENT: normocephalic, atraumatic, moist membranes  Neck: supple  Extremities: no edema noted   Skin: no lesions or rash  Musculosketal: no bony abnormalities    Neurologic Examination:   Mental status: alert, awake, oriented X 3 and following commands  Speech/Language: Speech is fluent without any dysarthria, no aphasia noted, can name, repeat, read and write and comprehension intact    Cranial Nerves:   CN I: smell not tested  CN II: Visual fields full to confrontation  CN III, IV, VI: Extraocular movements intact bilaterally  Pupils equal round and reactive to light bilaterally  CN V: Facial sensation is normal   CN VII: Full and symmetric facial movement  CN VIII: Hearing is normal   CN IX, X: Palate elevates symmetrically  CN XI: Shoulder shrug strength is normal   CN XII: Tongue midline without atrophy or fasciculations  Motor:   Strength 5/5 in all 4 extremities  Bulk/tone - normal   Fasiculations - none    Sensory:   Sensation intact to soft touch in all 4 extremities      Cerebellar:   Finger-to-nose intact, normal heel to shin  Reflexes: 2+ in all 4 extremities  Pathologic reflexes - babinski reflex negative    Gait:   Normal casual gait     Review of Systems:     Review of Systems   Constitutional: Negative  Negative for appetite change and fever  HENT: Negative  Negative for hearing loss, tinnitus, trouble swallowing and voice change  Eyes: Negative  Negative for photophobia, pain and visual disturbance  Respiratory: Negative  Negative for shortness of breath  Cardiovascular: Negative  Negative for palpitations  Gastrointestinal: Negative  Negative for nausea and vomiting  Endocrine: Negative  Negative for cold intolerance  Genitourinary: Negative  Negative for dysuria, frequency and urgency  Musculoskeletal: Negative  Negative for gait problem, myalgias and neck pain  Skin: Negative  Negative for rash  Allergic/Immunologic: Negative  Neurological: Negative  Negative for dizziness, tremors, seizures, syncope, facial asymmetry, speech difficulty, weakness, light-headedness, numbness and headaches  Pain in the left side of ear and in ear   Hematological: Negative  Does not bruise/bleed easily  Psychiatric/Behavioral: Positive for sleep disturbance (Sometimes)  Negative for confusion and hallucinations  I have spent 32 minutes with Patient today in which greater than 50% of this time was spent in counseling/coordination of care regarding Risks and benefits of tx options, Instructions for management, Patient and family education, Risk factor reductions and Impressions  I also spent 15 minutes non face to face for this patient the same day

## 2023-02-20 DIAGNOSIS — I10 PRIMARY HYPERTENSION: ICD-10-CM

## 2023-02-20 RX ORDER — AMILORIDE HYDROCHLORIDE 5 MG/1
10 TABLET ORAL 2 TIMES DAILY
Qty: 180 TABLET | Refills: 2 | OUTPATIENT
Start: 2023-02-20 | End: 2023-05-21

## 2023-02-20 NOTE — TELEPHONE ENCOUNTER
Good morning  I'm calling with regards to Ragini Christopher  His birth date is [id number]  Reasons I need to reschedule an appointment that he missed with Doctor Genoveva Carlson  And the second reason is there was a prescription that he urgently needs one of his daily medicines  It's called the amiloride HCL 5 milligram  He ran out  [facility] is unable to refill it and he really does need that  That's one of his daily required medications  My name is Melinda See  I'm his wife  You can give me a call back at [phone] so I can get an appointment for him to come in and see [facility] and see if he can get his medicine  Thank you

## 2023-02-22 ENCOUNTER — TELEPHONE (OUTPATIENT)
Dept: FAMILY MEDICINE CLINIC | Facility: CLINIC | Age: 43
End: 2023-02-22

## 2023-02-22 DIAGNOSIS — I10 PRIMARY HYPERTENSION: ICD-10-CM

## 2023-02-22 RX ORDER — AMILORIDE HYDROCHLORIDE 5 MG/1
10 TABLET ORAL 2 TIMES DAILY
Qty: 180 TABLET | Refills: 2 | Status: SHIPPED | OUTPATIENT
Start: 2023-02-22 | End: 2023-05-23

## 2023-02-22 NOTE — TELEPHONE ENCOUNTER
Patient needs a refill on amiloride he has an appointment on 3/27/23   The patient is out of this medication  Can it be sent to [facility]   Thank you

## 2023-03-22 ENCOUNTER — TELEPHONE (OUTPATIENT)
Dept: FAMILY MEDICINE CLINIC | Facility: CLINIC | Age: 43
End: 2023-03-22

## 2023-05-16 ENCOUNTER — TELEPHONE (OUTPATIENT)
Dept: FAMILY MEDICINE CLINIC | Facility: CLINIC | Age: 43
End: 2023-05-16

## 2023-05-16 NOTE — TELEPHONE ENCOUNTER
Good morning  I'm calling on behalf of Anne Smith  First name is MIKE's last name is Nick Foley  His birthday is [birth year]  I am calling with regards to a refill for his Colorcon M20 Potassium pills  I know Doctor Lazaro Sandoval is no longer with the practice and Mike has to schedule an appointment to come in to see your physician  But he is running low on Colorcon and Colorcon is one of his needed daily medicines if someone can give me a call back  My name is Solange Arguellomaria del carmen  I'm his wife  I can be reached at [phone]  Thank you

## 2023-05-16 NOTE — TELEPHONE ENCOUNTER
Requested medications will be addressed at time of visit 5/17/2023  Patient has not been seen in over a year

## 2023-05-17 ENCOUNTER — TELEPHONE (OUTPATIENT)
Dept: FAMILY MEDICINE CLINIC | Facility: CLINIC | Age: 43
End: 2023-05-17

## 2023-05-17 NOTE — TELEPHONE ENCOUNTER
Pt.'s wife called in requesting a refill for Klor-Con 20 mgs, Per Kaelyn Cleaves we have no record on file showing pt. On Klor-Con and would need pharmacy to fax a copy of medical records indicating that pt. Has been prescribed this med previously. I provided our fax number to pt. s wife

## 2023-06-14 ENCOUNTER — APPOINTMENT (EMERGENCY)
Dept: RADIOLOGY | Facility: HOSPITAL | Age: 43
End: 2023-06-14
Payer: COMMERCIAL

## 2023-06-14 ENCOUNTER — HOSPITAL ENCOUNTER (EMERGENCY)
Facility: HOSPITAL | Age: 43
Discharge: HOME | End: 2023-06-14
Attending: EMERGENCY MEDICINE
Payer: COMMERCIAL

## 2023-06-14 VITALS
TEMPERATURE: 97.5 F | RESPIRATION RATE: 18 BRPM | OXYGEN SATURATION: 100 % | HEART RATE: 85 BPM | DIASTOLIC BLOOD PRESSURE: 107 MMHG | SYSTOLIC BLOOD PRESSURE: 199 MMHG

## 2023-06-14 DIAGNOSIS — I10 HYPERTENSION: ICD-10-CM

## 2023-06-14 DIAGNOSIS — R07.89 CHEST DISCOMFORT: Primary | ICD-10-CM

## 2023-06-14 LAB
ALBUMIN SERPL BCP-MCNC: 4.7 G/DL (ref 3.5–5)
ALP SERPL-CCNC: 74 U/L (ref 34–104)
ALT SERPL W P-5'-P-CCNC: 25 U/L (ref 7–52)
ANION GAP SERPL CALCULATED.3IONS-SCNC: 6 MMOL/L (ref 4–13)
AST SERPL W P-5'-P-CCNC: 19 U/L (ref 13–39)
BASOPHILS # BLD AUTO: 0.06 THOUSANDS/ÂΜL (ref 0–0.1)
BASOPHILS NFR BLD AUTO: 1 % (ref 0–1)
BILIRUB SERPL-MCNC: 1.13 MG/DL (ref 0.2–1)
BUN SERPL-MCNC: 13 MG/DL (ref 5–25)
CALCIUM SERPL-MCNC: 9.6 MG/DL (ref 8.4–10.2)
CARDIAC TROPONIN I PNL SERPL HS: 4 NG/L
CHLORIDE SERPL-SCNC: 101 MMOL/L (ref 96–108)
CO2 SERPL-SCNC: 30 MMOL/L (ref 21–32)
CREAT SERPL-MCNC: 1.27 MG/DL (ref 0.6–1.3)
CRP SERPL QL: <1 MG/L
D DIMER PPP FEU-MCNC: 0.44 UG/ML FEU
EOSINOPHIL # BLD AUTO: 0.29 THOUSAND/ÂΜL (ref 0–0.61)
EOSINOPHIL NFR BLD AUTO: 3 % (ref 0–6)
ERYTHROCYTE [DISTWIDTH] IN BLOOD BY AUTOMATED COUNT: 12.7 % (ref 11.6–15.1)
ERYTHROCYTE [SEDIMENTATION RATE] IN BLOOD: 1 MM/HOUR (ref 0–15)
GFR SERPL CREATININE-BSD FRML MDRD: 67 ML/MIN/1.73SQ M
GLUCOSE SERPL-MCNC: 105 MG/DL (ref 65–140)
HCT VFR BLD AUTO: 46.9 % (ref 36.5–49.3)
HGB BLD-MCNC: 15.7 G/DL (ref 12–17)
IMM GRANULOCYTES # BLD AUTO: 0.03 THOUSAND/UL (ref 0–0.2)
IMM GRANULOCYTES NFR BLD AUTO: 0 % (ref 0–2)
LYMPHOCYTES # BLD AUTO: 1.68 THOUSANDS/ÂΜL (ref 0.6–4.47)
LYMPHOCYTES NFR BLD AUTO: 18 % (ref 14–44)
MAGNESIUM SERPL-MCNC: 2.2 MG/DL (ref 1.9–2.7)
MCH RBC QN AUTO: 28.7 PG (ref 26.8–34.3)
MCHC RBC AUTO-ENTMCNC: 33.5 G/DL (ref 31.4–37.4)
MCV RBC AUTO: 86 FL (ref 82–98)
MONOCYTES # BLD AUTO: 0.78 THOUSAND/ÂΜL (ref 0.17–1.22)
MONOCYTES NFR BLD AUTO: 8 % (ref 4–12)
NEUTROPHILS # BLD AUTO: 6.72 THOUSANDS/ÂΜL (ref 1.85–7.62)
NEUTS SEG NFR BLD AUTO: 70 % (ref 43–75)
NRBC BLD AUTO-RTO: 0 /100 WBCS
PLATELET # BLD AUTO: 228 THOUSANDS/UL (ref 149–390)
PMV BLD AUTO: 8.8 FL (ref 8.9–12.7)
POTASSIUM SERPL-SCNC: 4 MMOL/L (ref 3.5–5.3)
PROT SERPL-MCNC: 7.2 G/DL (ref 6.4–8.4)
RBC # BLD AUTO: 5.47 MILLION/UL (ref 3.88–5.62)
SODIUM SERPL-SCNC: 137 MMOL/L (ref 135–147)
WBC # BLD AUTO: 9.56 THOUSAND/UL (ref 4.31–10.16)

## 2023-06-14 PROCEDURE — 96360 HYDRATION IV INFUSION INIT: CPT

## 2023-06-14 PROCEDURE — 85379 FIBRIN DEGRADATION QUANT: CPT | Performed by: PHYSICIAN ASSISTANT

## 2023-06-14 PROCEDURE — 85025 COMPLETE CBC W/AUTO DIFF WBC: CPT | Performed by: PHYSICIAN ASSISTANT

## 2023-06-14 PROCEDURE — 80053 COMPREHEN METABOLIC PANEL: CPT | Performed by: PHYSICIAN ASSISTANT

## 2023-06-14 PROCEDURE — 99285 EMERGENCY DEPT VISIT HI MDM: CPT

## 2023-06-14 PROCEDURE — 71046 X-RAY EXAM CHEST 2 VIEWS: CPT

## 2023-06-14 PROCEDURE — 83735 ASSAY OF MAGNESIUM: CPT | Performed by: PHYSICIAN ASSISTANT

## 2023-06-14 PROCEDURE — 86140 C-REACTIVE PROTEIN: CPT | Performed by: PHYSICIAN ASSISTANT

## 2023-06-14 PROCEDURE — 93005 ELECTROCARDIOGRAM TRACING: CPT

## 2023-06-14 PROCEDURE — 84484 ASSAY OF TROPONIN QUANT: CPT | Performed by: PHYSICIAN ASSISTANT

## 2023-06-14 PROCEDURE — 36415 COLL VENOUS BLD VENIPUNCTURE: CPT | Performed by: PHYSICIAN ASSISTANT

## 2023-06-14 PROCEDURE — 85652 RBC SED RATE AUTOMATED: CPT | Performed by: PHYSICIAN ASSISTANT

## 2023-06-14 RX ORDER — KETOROLAC TROMETHAMINE 30 MG/ML
15 INJECTION, SOLUTION INTRAMUSCULAR; INTRAVENOUS ONCE
Status: DISCONTINUED | OUTPATIENT
Start: 2023-06-14 | End: 2023-06-14 | Stop reason: HOSPADM

## 2023-06-14 RX ADMIN — SODIUM CHLORIDE 1000 ML: 0.9 INJECTION, SOLUTION INTRAVENOUS at 13:45

## 2023-06-14 NOTE — DISCHARGE INSTRUCTIONS
Please return to the emergency department for worsening symptoms including chest pain, shortness of breath, dizziness, lightheadedness, fever greater than 103, severe pain, inability to walk, fainting episodes, etc  Please follow-up with your family practice provider as soon as possible    Follow-up with a cardiologist

## 2023-06-14 NOTE — ED PROVIDER NOTES
"History  Chief Complaint   Patient presents with   • Chest Pain     Pt reports unusual sensations in chest x 2 days; states \"its not pain\"      This is a 78-year-old male with past medical history significant for hypertension presenting to the emergency department today for chest discomfort  This has been ongoing for approximately 3 days  It is located diffusely throughout his chest   He points out that it is not \"pain\" per se but it is an uncomfortable feeling in his chest   Pain is constant and does not radiate  He notes it is worse with deep breathing  He denies any palpitations or shortness of breath  He denies any dizziness or visual disturbances but does occasionally note lightheadedness  He denies any nausea, vomiting, diarrhea, or constipation  He has not had any fevers or chills  He has no upper respiratory symptoms  He has a history of hypertension and notes that he took his medication earlier this morning  The patient denies other complaints at this time  History provided by:  Patient   used: No    Chest Pain  Chest pain location: generalized    Pain radiates to:  Does not radiate  Pain radiates to the back: no    Pain severity:  Moderate  Onset quality:  Gradual  Duration:  3 days  Timing:  Constant  Progression:  Worsening  Chronicity:  New  Context: breathing    Relieved by:  Nothing  Worsened by:  Nothing tried  Ineffective treatments:  None tried  Associated symptoms: anxiety    Associated symptoms: no abdominal pain, no altered mental status, no anorexia, no back pain, no claudication, no cough, no diaphoresis, no dizziness, no dysphagia, no fatigue, no fever, no headache, no heartburn, no lower extremity edema, no nausea, no near-syncope, no numbness, no orthopnea, no palpitations, no PND, no shortness of breath, no syncope, not vomiting and no weakness    Risk factors: hypertension and male sex        Prior to Admission Medications   Prescriptions Last Dose " Informant Patient Reported? Taking? AMILoride 5 mg tablet   No No   Sig: Take 2 tablets (10 mg total) by mouth 2 (two) times a day PATIENT TAKES ONE TABLET BID   ASHWAGANDHA PO  Self Yes No   Sig: Take by mouth daily   Potassium Chloride ER 20 MEQ TBCR  Self No No   Sig: TAKE 1 TABLET BY MOUTH TWICE A DAY   carvedilol (COREG) 12 5 mg tablet  Self No No   Sig: TAKE 1 TABLET BY MOUTH TWICE A DAY WITH FOOD      Facility-Administered Medications: None       Past Medical History:   Diagnosis Date   • Anxiety    • Hypertension    • Liddle's syndrome     diagnosed 2 years ago       Past Surgical History:   Procedure Laterality Date   • CARDIAC CATHETERIZATION  05/31/2018   • [place] OPEN TREATMENT ULNAR FRACTURE PROXIMAL END Left 8/24/2022    Procedure: OPEN REDUCTION W/ INTERNAL FIXATION (ORIF) ELBOW- left olecranon and all associated reconstructive procedure;  Surgeon: Luisa Grayson;  Location: [facility];  Service: Orthopedics       Family History   Problem Relation Age of Onset   • Hypertension Mother    • Hypertension Maternal Grandmother    • Diabetes Neg Hx    • Coronary artery disease Neg Hx      I have reviewed and agree with the history as documented  E-Cigarette/Vaping   • E-Cigarette Use Never User      E-Cigarette/Vaping Substances   • Nicotine No    • THC No    • CBD No    • Flavoring No    • Other No    • Unknown No      Social History     Tobacco Use   • Smoking status: Never   • Smokeless tobacco: Never   Vaping Use   • Vaping Use: Never used   Substance Use Topics   • Alcohol use: Never     Comment: none per minesh   • Drug use: Yes     Types: Marijuana     Comment: every two three weeks       Review of Systems   Constitutional: Negative for appetite change, chills, diaphoresis, fatigue and fever  HENT: Negative for trouble swallowing  Eyes: Negative for visual disturbance  Respiratory: Positive for chest tightness  Negative for cough, shortness of breath and wheezing      Cardiovascular: Positive for chest pain  Negative for palpitations, orthopnea, claudication, leg swelling, syncope, PND and near-syncope  Gastrointestinal: Negative for abdominal pain, anorexia, constipation, diarrhea, heartburn, nausea and vomiting  Musculoskeletal: Negative for back pain, neck pain and neck stiffness  Skin: Negative for rash and wound  Neurological: Positive for light-headedness  Negative for dizziness, seizures, syncope, weakness, numbness and headaches  Psychiatric/Behavioral: Negative for confusion  All other systems reviewed and are negative  Physical Exam  Physical Exam  Vitals and nursing note reviewed  Constitutional:       General: He is not in acute distress  Appearance: Normal appearance  He is normal weight  He is not ill-appearing, toxic-appearing or diaphoretic  HENT:      Head: Normocephalic and atraumatic  Nose: Nose normal  No congestion or rhinorrhea  Mouth/Throat:      Mouth: Mucous membranes are moist       Pharynx: No oropharyngeal exudate or posterior oropharyngeal erythema  Eyes:      General: No scleral icterus  Right eye: No discharge  Left eye: No discharge  Conjunctiva/sclera: Conjunctivae normal    Cardiovascular:      Rate and Rhythm: Regular rhythm  Tachycardia present  Pulses: Normal pulses  Heart sounds: Normal heart sounds  No murmur heard  No friction rub  No gallop  Pulmonary:      Effort: Pulmonary effort is normal  No respiratory distress  Breath sounds: Normal breath sounds  No stridor  No wheezing, rhonchi or rales  Comments: Clear to auscultation bilaterally without adventitious breath sounds  Chest:      Chest wall: No tenderness  Abdominal:      General: Abdomen is flat  There is no distension  Palpations: Abdomen is soft  Tenderness: There is no abdominal tenderness  There is no right CVA tenderness, left CVA tenderness, guarding or rebound     Musculoskeletal:         General: Normal range of motion  Cervical back: Normal range of motion  No rigidity  Right lower leg: No edema  Left lower leg: No edema  Comments: Negative Homans' sign bilaterally   Skin:     General: Skin is warm and dry  Capillary Refill: Capillary refill takes less than 2 seconds  Coloration: Skin is not jaundiced or pale  Neurological:      General: No focal deficit present  Mental Status: He is alert and oriented to person, place, and time  Mental status is at baseline     Psychiatric:         Mood and Affect: Mood normal          Behavior: Behavior normal          Vital Signs  ED Triage Vitals   Temperature Pulse Respirations Blood Pressure SpO2   06/14/23 1317 06/14/23 1316 06/14/23 1315 06/14/23 1317 06/14/23 1316   97 5 °F (36 4 °C) 101 18 (!) 193/103 100 %      Temp Source Heart Rate Source Patient Position - Orthostatic VS BP Location FiO2 (%)   06/14/23 1317 06/14/23 1315 -- -- --   Oral Monitor         Pain Score       06/14/23 1331       No Pain           Vitals:    06/14/23 1316 06/14/23 1317 06/14/23 1431   BP:  (!) 193/103 (!) 199/107   Pulse: 101  85         Visual Acuity      ED Medications  Medications   sodium chloride 0 9 % bolus 1,000 mL (0 mL Intravenous Stopped 6/14/23 1502)       Diagnostic Studies  Results Reviewed     Procedure Component Value Units Date/Time    Sedimentation rate, automated [[id number]]  (Normal) Collected: 06/14/23 1329    Lab Status: Final result Specimen: Blood from Arm, Right Updated: 06/14/23 1416     Sed Rate 1 mm/hour     C-reactive protein [[id number]]  (Normal) Collected: 06/14/23 1329    Lab Status: Final result Specimen: Blood from Arm, Right Updated: 06/14/23 1414     CRP <1 0 mg/L     HS Troponin 0hr (reflex protocol) [[id number]]  (Normal) Collected: 06/14/23 1329    Lab Status: Final result Specimen: Blood from Arm, Right Updated: 06/14/23 1359     hs TnI 0hr 4 ng/L     Comprehensive metabolic panel [[id number]]  (Abnormal) Collected: 06/14/23 1329    Lab Status: Final result Specimen: Blood from Arm, Right Updated: 06/14/23 1354     Sodium 137 mmol/L      Potassium 4 0 mmol/L      Chloride 101 mmol/L      CO2 30 mmol/L      ANION GAP 6 mmol/L      BUN 13 mg/dL      Creatinine 1 27 mg/dL      Glucose 105 mg/dL      Calcium 9 6 mg/dL      AST 19 U/L      ALT 25 U/L      Alkaline Phosphatase 74 U/L      Total Protein 7 2 g/dL      Albumin 4 7 g/dL      Total Bilirubin 1 13 mg/dL      eGFR 67 ml/min/1 73sq m     Narrative:      [place] guidelines for Chronic Kidney Disease (CKD):   •  Stage 1 with normal or high GFR (GFR > 90 mL/min/1 73 square meters)  •  Stage 2 Mild CKD (GFR = 60-89 mL/min/1 73 square meters)  •  Stage 3A Moderate CKD (GFR = 45-59 mL/min/1 73 square meters)  •  Stage 3B Moderate CKD (GFR = 30-44 mL/min/1 73 square meters)  •  Stage 4 Severe CKD (GFR = 15-29 mL/min/1 73 square meters)  •  Stage 5 End Stage CKD (GFR <15 mL/min/1 73 square meters)  Note: GFR calculation is accurate only with a steady state creatinine    Magnesium [[id number]]  (Normal) Collected: 06/14/23 1329    Lab Status: Final result Specimen: Blood from Arm, Right Updated: 06/14/23 1354     Magnesium 2 2 mg/dL     D-Dimer [[id number]]  (Normal) Collected: 06/14/23 1329    Lab Status: Final result Specimen: Blood from Arm, Right Updated: 06/14/23 1349     D-Dimer, Quant 0 44 ug/ml FEU     CBC and differential [[id number]]  (Abnormal) Collected: 06/14/23 1329    Lab Status: Final result Specimen: Blood from Arm, Right Updated: 06/14/23 1338     WBC 9 56 Thousand/uL      RBC 5 47 Million/uL      Hemoglobin 15 7 g/dL      Hematocrit 46 9 %      MCV 86 fL      MCH 28 7 pg      MCHC 33 5 g/dL      RDW 12 7 %      MPV 8 8 fL      Platelets 587 Thousands/uL      nRBC 0 /100 WBCs      Neutrophils Relative 70 %      Immat GRANS % 0 %      Lymphocytes Relative 18 %      Monocytes Relative 8 %      Eosinophils Relative 3 %      Basophils Relative 1 %      Neutrophils Absolute 6 72 Thousands/µL      Immature Grans Absolute 0 03 Thousand/uL      Lymphocytes Absolute 1 68 Thousands/µL      Monocytes Absolute 0 78 Thousand/µL      Eosinophils Absolute 0 29 Thousand/µL      Basophils Absolute 0 06 Thousands/µL                  XR chest 2 views   ED Interpretation by Ruby Souza PA-C (06/14 7868)   No acute cardiopulmonary disease                 Procedures  ECG 12 Lead Documentation Only    Date/Time: 6/14/2023 1:17 PM    Performed by: Ruby Souza PA-C  Authorized by: Lord Jorge Alberto Colmenares PA-C    Indications / Diagnosis:  Chest Discomfort  Patient location:  ED  Previous ECG:     Previous ECG:  Unavailable  Interpretation:     Interpretation: non-specific    Rate:     ECG rate:  83    ECG rate assessment: normal    Rhythm:     Rhythm: sinus rhythm    Ectopy:     Ectopy: none    QRS:     QRS axis:  Normal  Conduction:     Conduction: normal    ST segments:     ST segments:  Non-specific    Depression:  V2 and V3  T waves:     T waves: normal    Comments:      Normal sinus rhythm with a rate of 83  Nonspecific ST segment changes in V2 and V3  Normal axis  No ectopy  No reciprocal changes  No STEMI  ED Course             HEART Risk Score    Flowsheet Row Most Recent Value   Heart Score Risk Calculator    History 1 Filed at: 06/14/2023 1456   ECG 1 Filed at: 06/14/2023 1456   Age 1 Filed at: 06/14/2023 1456   Risk Factors 2 Filed at: 06/14/2023 1456   Troponin 0 Filed at: 06/14/2023 1456   HEART Score 5 Filed at: 06/14/2023 1456                                      Medical Decision Making  This is a 57-year-old male presenting to the emergency department today for chest discomfort  Ongoing for 3 days  Has been constant  Vitals show hypertension  Patient has a long history of hypertension and takes medications for this at home    Lungs are clear to auscultation bilaterally without adventitious breath "sounds  Negative Homans' sign bilaterally  EKG shows normal sinus rhythm with a rate of 83  Nonspecific ST segment changes are likely benign  Chest x-ray shows no acute cardiopulmonary disease on my independent interpretation  Initial troponin is 4  D-dimer 0 44  Negative inflammatory markers  Normal magnesium  Heart score is 5  The patient is stable for discharge at this time  Follow-up outpatient with cardiology based upon elevated heart score  Strict return precautions were given  Recommend PCP follow-up as soon as possible  The patient and/or patient's proxy verify their understanding and agree to the plan at this time  All questions answered to the patient and/or their proxy's satisfaction  All labs reviewed and utilized in the medical decision making process (if labs were ordered)  Portions of the record may have been created with voice recognition software   Occasional wrong word or \"sound a like\" substitutions may have occurred due to the inherent limitations of voice recognition software   Read the chart carefully and recognize, using context, where substitutions have occurred  Chest discomfort: complicated acute illness or injury  Hypertension: complicated acute illness or injury  Amount and/or Complexity of Data Reviewed  External Data Reviewed: notes  Labs: ordered  Decision-making details documented in ED Course  Radiology: ordered and independent interpretation performed  Decision-making details documented in ED Course  ECG/medicine tests: ordered  Decision-making details documented in ED Course            Disposition  Final diagnoses:   Hypertension   Chest discomfort     Time reflects when diagnosis was documented in both MDM as applicable and the Disposition within this note     Time User Action Codes Description Comment    6/14/2023  2:54 PM Sandeep Barton Add [I10] Hypertension     6/14/2023  2:54 PM Ronnell Colmenares Add [R07 89] Chest discomfort     6/14/2023  " 2:55 PM Dany Colmenares Modify [I10] Hypertension     6/14/2023  2:55 PM Dany Colmenares Modify [R07 89] Chest discomfort       ED Disposition     ED Disposition   Discharge    Condition   Stable    Date/Time   Wed Jun 14, 2023  2:54 PM    Comment   Lyle Hernandez discharge to home/self care                 Follow-up Information     Follow up With Specialties Details Why Contact Info Additional Information    Jeanie Nelson MD Internal Medicine Schedule an appointment as soon as possible for a visit   [address] [facility] [phone]       [address] [facility] Emergency Department Emergency Medicine Go to  If symptoms worsen [address] [facility],[address] [id number]  [address] [facility] Emergency Department, [address], [address] [place] [address]    Grover Contreras MD Cardiology, Cardiology Imaging, Multidisciplinary Schedule an appointment as soon as possible for a visit   [address] [facility]  [address] [place] [address]  [phone]             Discharge Medication List as of 6/14/2023  2:56 PM      CONTINUE these medications which have NOT CHANGED    Details   AMILoride 5 mg tablet Take 2 tablets (10 mg total) by mouth 2 (two) times a day PATIENT TAKES ONE TABLET BID, Starting Wed 2/22/2023, Until Tue 5/23/2023, Normal      ASHWAGANDHA PO Take by mouth daily, Historical Med      carvedilol (COREG) 12 5 mg tablet TAKE 1 TABLET BY MOUTH TWICE A DAY WITH FOOD, Normal      Potassium Chloride ER 20 MEQ TBCR TAKE 1 TABLET BY MOUTH TWICE A DAY, Normal                 PDMP Review       Value Time User    PDMP Reviewed  Yes 8/1/2022  7:02 PM Arpita Taylor MD          ED Provider  Electronically Signed by           Nadiya Jones PA-C  06/14/23 7863

## 2023-06-15 LAB
ATRIAL RATE: 83 BPM
P AXIS: 78 DEGREES
PR INTERVAL: 152 MS
QRS AXIS: 72 DEGREES
QRSD INTERVAL: 70 MS
QT INTERVAL: 340 MS
QTC INTERVAL: 399 MS
T WAVE AXIS: 52 DEGREES
VENTRICULAR RATE: 83 BPM

## 2023-06-15 PROCEDURE — 93010 ELECTROCARDIOGRAM REPORT: CPT | Performed by: INTERNAL MEDICINE

## 2023-07-14 RX ORDER — POTASSIUM CHLORIDE 1500 MG/1
TABLET, EXTENDED RELEASE ORAL
Qty: 120 TABLET | OUTPATIENT
Start: 2023-07-14

## 2023-08-16 ENCOUNTER — OFFICE VISIT (OUTPATIENT)
Dept: FAMILY MEDICINE CLINIC | Facility: CLINIC | Age: 43
End: 2023-08-16
Payer: COMMERCIAL

## 2023-08-16 VITALS
RESPIRATION RATE: 18 BRPM | WEIGHT: 146.8 LBS | BODY MASS INDEX: 22.25 KG/M2 | HEART RATE: 87 BPM | DIASTOLIC BLOOD PRESSURE: 100 MMHG | OXYGEN SATURATION: 99 % | SYSTOLIC BLOOD PRESSURE: 150 MMHG | HEIGHT: 68 IN | TEMPERATURE: 98.3 F

## 2023-08-16 DIAGNOSIS — Z11.4 SCREENING FOR HIV (HUMAN IMMUNODEFICIENCY VIRUS): ICD-10-CM

## 2023-08-16 DIAGNOSIS — E78.2 MIXED HYPERLIPIDEMIA: ICD-10-CM

## 2023-08-16 DIAGNOSIS — I10 PRIMARY HYPERTENSION: ICD-10-CM

## 2023-08-16 DIAGNOSIS — Z12.11 COLON CANCER SCREENING: ICD-10-CM

## 2023-08-16 DIAGNOSIS — E87.6 HYPOKALEMIA: ICD-10-CM

## 2023-08-16 DIAGNOSIS — I15.8 OTHER SECONDARY HYPERTENSION: ICD-10-CM

## 2023-08-16 DIAGNOSIS — I15.1 LIDDLE'S SYNDROME: Primary | ICD-10-CM

## 2023-08-16 DIAGNOSIS — Z11.59 NEED FOR HEPATITIS C SCREENING TEST: ICD-10-CM

## 2023-08-16 PROCEDURE — 99213 OFFICE O/P EST LOW 20 MIN: CPT | Performed by: INTERNAL MEDICINE

## 2023-08-16 RX ORDER — POTASSIUM CHLORIDE 1500 MG/1
1 TABLET, EXTENDED RELEASE ORAL 2 TIMES DAILY
Qty: 180 TABLET | Refills: 0 | Status: SHIPPED | OUTPATIENT
Start: 2023-08-16

## 2023-08-16 RX ORDER — CARVEDILOL 12.5 MG/1
12.5 TABLET ORAL 2 TIMES DAILY WITH MEALS
Qty: 180 TABLET | Refills: 0 | Status: SHIPPED | OUTPATIENT
Start: 2023-08-16

## 2023-08-16 RX ORDER — AMILORIDE HYDROCHLORIDE 5 MG/1
10 TABLET ORAL 2 TIMES DAILY
Qty: 360 TABLET | Refills: 0 | Status: SHIPPED | OUTPATIENT
Start: 2023-08-16 | End: 2023-11-14

## 2023-08-16 NOTE — PROGRESS NOTES
Name: Justin Christy      : 1980      MRN: [id number]  Encounter Provider: Maxx Haddad MD  Encounter Date: 2023   Encounter department: [facility]     1. Liddle's syndrome  Assessment & Plan:  Referral to nephrology given  Not very compliant with medications  Will see what they recommend for his BP meds- he is very hesitant to change anything right now despite pressure being high    Orders:  -     Comprehensive metabolic panel; Future  -     Ambulatory Referral to Nephrology; Future    2. Need for hepatitis C screening test  -     Hepatitis C Antibody; Future    3. Screening for HIV (human immunodeficiency virus)  -     HIV 1/2 AG/AB w Reflex [facility] for 2 yr old and above; Future    4. Other secondary hypertension  Assessment & Plan:  BP is still high though better at home  Discussed medication changes but pt does not want to try something else since he feels like he has not tolerated other things well- suggested amlodipine  Discussed consequences of high blood pressure including stroke   Referral to nephro given to discussed his Liddle's syndrome and determine best treatment for his hypertension    Orders:  -     CBC and differential; Future    5. Mixed hyperlipidemia  -     Lipid panel; Future    6. Colon cancer screening  -     Ambulatory Referral to Gastroenterology; Future    7. Primary hypertension  -     AMILoride 5 mg tablet; Take 2 tablets (10 mg total) by mouth 2 (two) times a day PATIENT TAKES ONE TABLET BID  -     carvedilol (COREG) 12.5 mg tablet; Take 1 tablet (12.5 mg total) by mouth 2 (two) times a day with meals    8. Hypokalemia  Assessment & Plan:  Due for labs- ordered today  Continue supplement for now    Orders:  -     Potassium Chloride ER 20 MEQ TBCR; Take 1 tablet (20 mEq total) by mouth 2 (two) times a day         Lesley Vazquez is a 47-year-old male with a history of Liddle's syndrome who presents to [facility] care. He has not been seen in a while. He is taking his medications however he does not always take both his amiloride or Coreg twice a day. States that he can tell when his blood pressure and potassium is off. He checks his blood pressure at home and it tends to be 262-653 range systolic. Denies headache, chest pain, difficulty breathing, vision changes. Review of Systems   Constitutional: Negative for chills and fever. HENT: Negative for congestion, rhinorrhea and sore throat. Eyes: Negative for visual disturbance. Respiratory: Negative for cough and shortness of breath. Cardiovascular: Negative for chest pain. Gastrointestinal: Negative for abdominal distention, constipation, diarrhea, nausea and vomiting. Endocrine: Negative for polyuria. Genitourinary: Negative for dysuria and frequency. Musculoskeletal: Negative for back pain. Skin: Negative for rash. Neurological: Negative for headaches. Psychiatric/Behavioral: Negative for dysphoric mood. All other systems reviewed and are negative.       Past Medical History:   Diagnosis Date   • Anxiety    • Hypertension    • Liddle's syndrome     diagnosed 2 years ago     Past Surgical History:   Procedure Laterality Date   • CARDIAC CATHETERIZATION  05/31/2018   • [place] OPEN TREATMENT ULNAR FRACTURE PROXIMAL END Left 8/24/2022    Procedure: OPEN REDUCTION W/ INTERNAL FIXATION (ORIF) ELBOW- left olecranon and all associated reconstructive procedure;  Surgeon: Swetha Wilson;  Location:  MAIN OR;  Service: Orthopedics     Family History   Problem Relation Age of Onset   • Hypertension Mother    • Hypertension Maternal Grandmother    • Diabetes Neg Hx    • Coronary artery disease Neg Hx      Social History     Socioeconomic History   • Marital status: Single     Spouse name: None   • Number of children: None   • Years of education: None   • Highest education level: None   Occupational History   • None   Tobacco Use   • Smoking status: Never   • Smokeless tobacco: Never   Vaping Use   • Vaping Use: Never used   Substance and Sexual Activity   • Alcohol use: Never     Comment: none per minesh   • Drug use: Yes     Types: Marijuana     Comment: every two three weeks   • Sexual activity: Yes     Partners: Female   Other Topics Concern   • None   Social History Narrative    · Most recent tobacco use screening:   10-      · Do you currently or have you served in the [address] [place] [address] Street:   No      · Live alone or with others:   with others      · Exercise level:   Occasional      · General stress level:   Medium      · Diet:   Regular      · Caffeine intake:   None      · Last modified by [id number]   07-, 03:32      Social Determinants of Health     Financial Resource Strain: Not on file   Food Insecurity: Not on file   Transportation Needs: Not on file   Physical Activity: Not on file   Stress: Not on file   Social Connections: Not on file   Intimate Partner Violence: Not on file   Housing Stability: Not on file     Current Outpatient Medications on File Prior to Visit   Medication Sig   • ASHWAGANDHA PO Take by mouth daily     No Known Allergies    There is no immunization history on file for this patient. Objective     /100 (BP Location: Right arm, Patient Position: Sitting, Cuff Size: Adult)   Pulse 87   Temp 98.3 °F (36.8 °C) (Temporal)   Resp 18   Ht 5' 7.91" (1.725 m)   Wt 66.6 kg (146 lb 12.8 oz)   SpO2 99%   BMI 22.38 kg/m²     Physical Exam  Vitals reviewed. Constitutional:       General: He is not in acute distress. HENT:      Right Ear: External ear normal.      Left Ear: External ear normal.      Nose: Nose normal.      Mouth/Throat:      Mouth: Mucous membranes are moist.   Eyes:      Conjunctiva/sclera: Conjunctivae normal.   Cardiovascular:      Rate and Rhythm: Normal rate and regular rhythm. Heart sounds: No murmur heard. Pulmonary:      Effort: Pulmonary effort is normal. No respiratory distress. Breath sounds: No wheezing. Abdominal:      General: There is no distension. Palpations: Abdomen is soft. Tenderness: There is no abdominal tenderness. Musculoskeletal:      Right lower leg: No edema. Left lower leg: No edema. Lymphadenopathy:      Cervical: No cervical adenopathy. Neurological:      Mental Status: He is alert and oriented to person, place, and time.    Psychiatric:         Mood and Affect: Mood normal.       Maxx Haddad MD

## 2023-08-17 ENCOUNTER — TELEPHONE (OUTPATIENT)
Dept: NEPHROLOGY | Facility: CLINIC | Age: 43
End: 2023-08-17

## 2023-08-17 NOTE — ASSESSMENT & PLAN NOTE
Referral to nephrology given  Not very compliant with medications  Will see what they recommend for his BP meds- he is very hesitant to change anything right now despite pressure being high

## 2023-08-17 NOTE — ASSESSMENT & PLAN NOTE
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

## 2023-10-19 ENCOUNTER — OFFICE VISIT (OUTPATIENT)
Dept: OTOLARYNGOLOGY | Facility: CLINIC | Age: 43
End: 2023-10-19
Payer: COMMERCIAL

## 2023-10-19 ENCOUNTER — OFFICE VISIT (OUTPATIENT)
Dept: AUDIOLOGY | Facility: CLINIC | Age: 43
End: 2023-10-19
Payer: COMMERCIAL

## 2023-10-19 DIAGNOSIS — K08.409: ICD-10-CM

## 2023-10-19 DIAGNOSIS — G44.209 MUSCLE TENSION HEADACHE: ICD-10-CM

## 2023-10-19 DIAGNOSIS — H92.02 OTALGIA, LEFT: Primary | ICD-10-CM

## 2023-10-19 DIAGNOSIS — M26.622 ARTHRALGIA OF LEFT TEMPOROMANDIBULAR JOINT: Primary | ICD-10-CM

## 2023-10-19 DIAGNOSIS — H91.91 HEARING LOSS OF RIGHT EAR, UNSPECIFIED HEARING LOSS TYPE: ICD-10-CM

## 2023-10-19 PROCEDURE — 99203 OFFICE O/P NEW LOW 30 MIN: CPT | Performed by: OTOLARYNGOLOGY

## 2023-10-19 PROCEDURE — 92567 TYMPANOMETRY: CPT | Performed by: AUDIOLOGIST-HEARING AID FITTER

## 2023-10-19 PROCEDURE — 92557 COMPREHENSIVE HEARING TEST: CPT | Performed by: AUDIOLOGIST-HEARING AID FITTER

## 2023-10-19 NOTE — PROGRESS NOTES
[facility]  Mike Mendesnys Friday 37 y.o. male MRN: [id number]  Encounter: [id number]  Beverly Hagan MD  Office : [phone]  Also available on [employer]    Thank you for referring Hamilton Libman for an evaluation. My recommendations are included. Please do not hesitate to contact me with any questions you may have. ASSESSMENT AND PLAN:      1. Arthralgia of left temporomandibular joint        2. Partially edentulous mandible, unspecified edentulism class        3. Muscle tension headache          Patient with significant TMJ tenderness. Completely normal clinical exam of the ear including normal audiometry. He does have a significant disbalance of his temporomandibular function due to the lack of molars on the left side. Patient needs to have dental evaluation. Also recommend the use of a dental guard, soft diet, avoid chewing gum. Follow-up as needed  ______________________________________________________________________    Reason for consultation : left otalgia     HPI: Hamilton Libman is a 37 y.o. male who reports that 3 months ago he started having sharp severe, recurrent ear , lasting minutes to an hour. No associated hearing loss, no otorrhea. Patient was seen by dentist, (he denies dental pain)    PRIOR VISIT, ENDOSCOPIC EVALUATION :     REVIEW OF SYSTEMS:    Review of systems:  [address] was performed and negative except as above or otherwise noted in the medical record.     Historical Information   Past Medical History:   Diagnosis Date    Anxiety     Hypertension     Liddle's syndrome     diagnosed 2 years ago     Past Surgical History:   Procedure Laterality Date    CARDIAC CATHETERIZATION  05/31/2018    [place] OPEN TREATMENT ULNAR FRACTURE PROXIMAL END Left 8/24/2022    Procedure: OPEN REDUCTION W/ INTERNAL FIXATION (ORIF) ELBOW- left olecranon and all associated reconstructive procedure;  Surgeon: Lv Alejandre;  Location:  MAIN OR;  Service: Orthopedics     Social History   Social History     Substance and Sexual Activity   Alcohol Use Never    Comment: none per minesh     Social History     Substance and Sexual Activity   Drug Use Yes    Types: Marijuana    Comment: every two three weeks     Social History     Tobacco Use   Smoking Status Never   Smokeless Tobacco Never     Family History   Problem Relation Age of Onset    Hypertension Mother     Hypertension Maternal Grandmother     Diabetes Neg Hx     Coronary artery disease Neg Hx        Meds/Allergies       Current Outpatient Medications:     AMILoride 5 mg tablet    ASHWAGANDHA PO    carvedilol (COREG) 12.5 mg tablet    Potassium Chloride ER 20 MEQ TBCR    No Known Allergies      PHYSICAL EXAM:    There were no vitals taken for this visit. There is no height or weight on file to calculate BMI. Constitutional: Oriented to person, place, and time. Well-developed and well-nourished, no apparent distress, non-toxic appearance. Cooperative, able to hear and answer questions without difficulty. Voice: Normal voice quality. Head: Normocephalic, atraumatic. No scars, masses or lesions. Face: Symmetric, no edema, no sinus tenderness. Eyes: Vision grossly intact, extra-ocular movement intact. Right Ear: External ear normal.  Auditory canal clear. Tympanic membrane well-appearing, without retraction or scarring. No fluid present. No post-auricular erythema or tenderness  Left Ear: External ear normal.  Auditory canal clear. Tympanic membrane well-appearing, without retraction or scarring. No fluid present. No post-auricular erythema or tenderness. Nose: Septum midline, nares clear. Mucosa moist, turbinates well appearing. No crusting, polyps or discharge evident. Oral cavity: Partially edentulous, in particular missing all the molars on the mandible left side. Mucosa moist, lips normal.  Tongue mobile, floor of mouth normal.  Hard palate unremarkable. No masses or lesions.    Oropharynx: Uvula is midline, soft palate normal.  Unremarkable oropharyngeal inlet. Tonsils unremarkable. Posterior pharyngeal wall clear. No masses or lesions. Salivary glands:  Parotid glands and submandibular glands symmetric, no enlargement or tenderness. Neck: Normal laryngeal elevation with swallow. Trachea midline. No masses or lesions. No palpable adenopathy. Thyroid: normal in size, unremarkable without tenderness or palpable nodules. Pulmonary/Chest: Normal effort and rate. No respiratory distress. Musculoskeletal: Normal range of motion. Neurological: Cranial nerves 2-12 intact. Skin: Skin is warm and dry. Psychiatric: Normal mood and affect. TMJ: Extremely tender palpation of the condyle particularly on the left side. There is also tenderness on the temporalis muscle on the left side. The excursion of the condyles during the epidural of the mouth is very asymmetric. There is no crepitus. Audiometry: Completely normal hearing level, with pure-tone averages at 12/15 dB, 100% word recognition, SRT's at 10 and 15 dB type a bilaterally tympanograms bilaterally. Imaging Studies: I have personally reviewed images on the PACS system and :   EKG, Pathology, and   Other Studies: I have personally reviewed pertinent reports and review of the chart reveals the presence of consultation to neurology in February 2023 with 1 year history of left facial pain with otalgia.

## 2023-10-31 LAB
ALBUMIN SERPL-MCNC: 4.7 G/DL (ref 3.6–5.1)
ALBUMIN/GLOB SERPL: 2 (CALC) (ref 1–2.5)
ALP SERPL-CCNC: 84 U/L (ref 36–130)
ALT SERPL-CCNC: 28 U/L (ref 9–46)
AST SERPL-CCNC: 20 U/L (ref 10–40)
BASOPHILS # BLD AUTO: 69 CELLS/UL (ref 0–200)
BASOPHILS NFR BLD AUTO: 0.7 %
BILIRUB SERPL-MCNC: 0.7 MG/DL (ref 0.2–1.2)
BUN SERPL-MCNC: 13 MG/DL (ref 7–25)
BUN/CREAT SERPL: NORMAL (CALC) (ref 6–22)
CALCIUM SERPL-MCNC: 9.6 MG/DL (ref 8.6–10.3)
CHLORIDE SERPL-SCNC: 101 MMOL/L (ref 98–110)
CHOLEST SERPL-MCNC: 226 MG/DL
CHOLEST/HDLC SERPL: 4 (CALC)
CO2 SERPL-SCNC: 32 MMOL/L (ref 20–32)
CREAT SERPL-MCNC: 1.21 MG/DL (ref 0.6–1.29)
EOSINOPHIL # BLD AUTO: 426 CELLS/UL (ref 15–500)
EOSINOPHIL NFR BLD AUTO: 4.3 %
ERYTHROCYTE [DISTWIDTH] IN BLOOD BY AUTOMATED COUNT: 13 % (ref 11–15)
GFR/BSA.PRED SERPLBLD CYS-BASED-ARV: 75 ML/MIN/1.73M2
GLOBULIN SER CALC-MCNC: 2.3 G/DL (CALC) (ref 1.9–3.7)
GLUCOSE SERPL-MCNC: 91 MG/DL (ref 65–99)
HCT VFR BLD AUTO: 45.5 % (ref 38.5–50)
HCV AB SERPL QL IA: NORMAL
HDLC SERPL-MCNC: 56 MG/DL
HGB BLD-MCNC: 15.3 G/DL (ref 13.2–17.1)
HIV 1+2 AB+HIV1 P24 AG SERPL QL IA: NORMAL
LDLC SERPL CALC-MCNC: 154 MG/DL (CALC)
LYMPHOCYTES # BLD AUTO: 1495 CELLS/UL (ref 850–3900)
LYMPHOCYTES NFR BLD AUTO: 15.1 %
MCH RBC QN AUTO: 28.9 PG (ref 27–33)
MCHC RBC AUTO-ENTMCNC: 33.6 G/DL (ref 32–36)
MCV RBC AUTO: 86 FL (ref 80–100)
MONOCYTES # BLD AUTO: 644 CELLS/UL (ref 200–950)
MONOCYTES NFR BLD AUTO: 6.5 %
NEUTROPHILS # BLD AUTO: 7267 CELLS/UL (ref 1500–7800)
NEUTROPHILS NFR BLD AUTO: 73.4 %
NONHDLC SERPL-MCNC: 170 MG/DL (CALC)
PLATELET # BLD AUTO: 237 THOUSAND/UL (ref 140–400)
PMV BLD REES-ECKER: 9.6 FL (ref 7.5–12.5)
POTASSIUM SERPL-SCNC: 3.8 MMOL/L (ref 3.5–5.3)
PROT SERPL-MCNC: 7 G/DL (ref 6.1–8.1)
RBC # BLD AUTO: 5.29 MILLION/UL (ref 4.2–5.8)
SODIUM SERPL-SCNC: 139 MMOL/L (ref 135–146)
TRIGL SERPL-MCNC: 63 MG/DL
WBC # BLD AUTO: 9.9 THOUSAND/UL (ref 3.8–10.8)

## 2023-11-01 ENCOUNTER — OFFICE VISIT (OUTPATIENT)
Dept: FAMILY MEDICINE CLINIC | Facility: CLINIC | Age: 43
End: 2023-11-01
Payer: COMMERCIAL

## 2023-11-01 VITALS
BODY MASS INDEX: 24.01 KG/M2 | HEIGHT: 67 IN | DIASTOLIC BLOOD PRESSURE: 100 MMHG | RESPIRATION RATE: 16 BRPM | SYSTOLIC BLOOD PRESSURE: 136 MMHG | OXYGEN SATURATION: 98 % | HEART RATE: 101 BPM | TEMPERATURE: 98.2 F | WEIGHT: 153 LBS

## 2023-11-01 DIAGNOSIS — Z12.11 COLON CANCER SCREENING: ICD-10-CM

## 2023-11-01 DIAGNOSIS — Z00.00 ANNUAL PHYSICAL EXAM: Primary | ICD-10-CM

## 2023-11-01 DIAGNOSIS — I15.8 OTHER SECONDARY HYPERTENSION: ICD-10-CM

## 2023-11-01 DIAGNOSIS — I10 PRIMARY HYPERTENSION: ICD-10-CM

## 2023-11-01 PROCEDURE — 99396 PREV VISIT EST AGE 40-64: CPT | Performed by: INTERNAL MEDICINE

## 2023-11-01 RX ORDER — CARVEDILOL 25 MG/1
25 TABLET ORAL 2 TIMES DAILY WITH MEALS
Qty: 180 TABLET | Refills: 0 | Status: SHIPPED | OUTPATIENT
Start: 2023-11-01

## 2023-11-01 RX ORDER — AMLODIPINE BESYLATE 5 MG/1
5 TABLET ORAL DAILY
Qty: 90 TABLET | Refills: 0 | Status: SHIPPED | OUTPATIENT
Start: 2023-11-01

## 2023-11-01 NOTE — PROGRESS NOTES
ADULT ANNUAL [facility] PRIMARY CARE    NAME: Dagoberto Gitelman  AGE: 37 y.o. SEX: male  : 1980     DATE: 2023     Assessment and Plan:     Problem List Items Addressed This Visit       Annual physical exam - Primary     Annual physical exam completed today. Discussed health maintenance topics including immunizations. Risk and benefits of relevant cancer screenings discussed and offered. Encouraged cessation of smoking if applicable. Encouraged healthy diet and exercise 3-5 times per week as tolerated. Reviewed prior labs and ordered labs if due. Repeat annual physical in 1 year. Other secondary hypertension     BP still high - discussed medication changes  We can increase Coreg to 25 mg BID  Start amlodipine  Unable to do HCTZ given his Liddle's  Encouraged to also follow up with nephrology soon  Monitor BP at home closely  Discussed the consequences of uncontrolled HTN         Relevant Medications    carvedilol (COREG) 25 mg tablet    amLODIPine (NORVASC) 5 mg tablet     Other Visit Diagnoses       Colon cancer screening        Relevant Orders    Ambulatory referral to Gastroenterology    Primary hypertension        Relevant Medications    carvedilol (COREG) 25 mg tablet    amLODIPine (NORVASC) 5 mg tablet            Immunizations and preventive care screenings were discussed with patient today. Appropriate education was printed on patient's after visit summary. Counseling:  Alcohol/drug use: discussed moderation in alcohol intake, the recommendations for healthy alcohol use, and avoidance of illicit drug use. Dental Health: discussed importance of regular tooth brushing, flossing, and dental visits. Injury prevention: discussed safety/seat belts, safety helmets, smoke detectors, carbon dioxide detectors, and smoking near bedding or upholstery. Exercise: the importance of regular exercise/physical activity was discussed. Recommend exercise 3-5 times per week for at least 30 minutes. Return in about 3 months (around 2/1/2024). Chief Complaint:     Chief Complaint   Patient presents with    Physical Exam    Headache      History of Present Illness:     Adult Annual Physical   Patient here for a comprehensive physical exam. The patient reports no problems. He has headaches and lightheadedness once in a while. He has a cuff at home but does not check BP regularly. It varies when he checks it so he is wondering how accurate it is. Diet and Physical Activity  Diet/Nutrition:  admits that diet has not been great lately . Exercise: no formal exercise. Depression Screening  PHQ-2/9 Depression Screening           General Health  Sleep: sleeps poorly. Hearing:  No issues . Vision: no vision problems. Dental: regular dental visits.  Health  Symptoms include: none     Review of Systems:     Review of Systems   Constitutional:  Negative for chills and fever. HENT:  Negative for congestion, rhinorrhea and sore throat. Eyes:  Negative for visual disturbance. Respiratory:  Negative for cough and shortness of breath. Cardiovascular:  Negative for chest pain. Gastrointestinal:  Negative for abdominal distention, constipation, diarrhea, nausea and vomiting. Endocrine: Negative for polyuria. Genitourinary:  Negative for dysuria and frequency. Musculoskeletal:  Negative for back pain. Skin:  Negative for rash. Neurological:  Positive for headaches. Psychiatric/Behavioral:  Negative for dysphoric mood. All other systems reviewed and are negative.      Past Medical History:     Past Medical History:   Diagnosis Date    Anxiety     Hypertension     Liddle's syndrome     diagnosed 2 years ago      Past Surgical History:     Past Surgical History:   Procedure Laterality Date    CARDIAC CATHETERIZATION  05/31/2018    [place] OPEN TREATMENT ULNAR FRACTURE PROXIMAL END Left 8/24/2022    Procedure: OPEN REDUCTION W/ INTERNAL FIXATION (ORIF) ELBOW- left olecranon and all associated reconstructive procedure;  Surgeon: Renan Emmanuel;  Location:  MAIN OR;  Service: Orthopedics      Family History:     Family History   Problem Relation Age of Onset    Hypertension Mother     Hypertension Maternal Grandmother     Diabetes Neg Hx     Coronary artery disease Neg Hx       Social History:     Social History     Socioeconomic History    Marital status: Single     Spouse name: None    Number of children: None    Years of education: None    Highest education level: None   Occupational History    None   Tobacco Use    Smoking status: Never     Passive exposure: Past    Smokeless tobacco: Never   Vaping Use    Vaping Use: Never used   Substance and Sexual Activity    Alcohol use: Never     Comment: none per minesh    Drug use: Yes     Types: Marijuana     Comment: every two three weeks    Sexual activity: Yes     Partners: Female   Other Topics Concern    None   Social History Narrative    · Most recent tobacco use screening:   10-      · Do you currently or have you served in the [address] [place] [address] Street:   No      · Live alone or with others:   with others      · Exercise level:   Occasional      · General stress level:   Medium      · Diet:   Regular      · Caffeine intake:   None      · Last modified by [id number]   07-, 03:32      Social Determinants of Health     Financial Resource Strain: Not on file   Food Insecurity: Not on file   Transportation Needs: Not on file   Physical Activity: Not on file   Stress: Not on file   Social Connections: Not on file   Intimate Partner Violence: Not on file   Housing Stability: Not on file      Current Medications:     Current Outpatient Medications   Medication Sig Dispense Refill    AMILoride 5 mg tablet Take 2 tablets (10 mg total) by mouth 2 (two) times a day PATIENT TAKES ONE TABLET  tablet 0    amLODIPine (NORVASC) 5 mg tablet Take 1 tablet (5 mg total) by mouth daily 90 tablet 0    ASHWAGANDHA PO Take by mouth daily      carvedilol (COREG) 25 mg tablet Take 1 tablet (25 mg total) by mouth 2 (two) times a day with meals 180 tablet 0    Potassium Chloride ER 20 MEQ TBCR Take 1 tablet (20 mEq total) by mouth 2 (two) times a day 180 tablet 0     No current facility-administered medications for this visit. Allergies:     No Known Allergies   Physical Exam:     /100 (BP Location: Left arm, Patient Position: Sitting, Cuff Size: Standard)   Pulse 101   Temp 98.2 °F (36.8 °C) (Tympanic)   Resp 16   Ht 5' 7" (1.702 m)   Wt 69.4 kg (153 lb)   SpO2 98%   BMI 23.96 kg/m²     Physical Exam  Vitals reviewed. Constitutional:       General: He is not in acute distress. Appearance: He is well-developed. HENT:      Head: Normocephalic and atraumatic. Right Ear: Tympanic membrane normal.      Left Ear: Tympanic membrane normal.      Nose: Nose normal.      Mouth/Throat:      Mouth: Mucous membranes are moist.   Eyes:      Extraocular Movements: Extraocular movements intact. Conjunctiva/sclera: Conjunctivae normal.      Pupils: Pupils are equal, round, and reactive to light. Cardiovascular:      Rate and Rhythm: Normal rate and regular rhythm. Heart sounds: No murmur heard. Pulmonary:      Effort: Pulmonary effort is normal. No respiratory distress. Breath sounds: Normal breath sounds. Abdominal:      Palpations: Abdomen is soft. Tenderness: There is no abdominal tenderness. Musculoskeletal:         General: No swelling. Cervical back: Neck supple. Lymphadenopathy:      Cervical: No cervical adenopathy. Skin:     General: Skin is warm and dry. Capillary Refill: Capillary refill takes less than 2 seconds. Neurological:      Mental Status: He is alert and oriented to person, place, and time. Cranial Nerves: No cranial nerve deficit.       Coordination: Coordination normal.   Psychiatric:         Mood and Affect: Mood normal.         Behavior: Behavior normal.         Thought Content:  Thought content normal.          River Azar MD  [address]

## 2023-11-01 NOTE — LETTER
November 1, 2023     Patient: Alexsandra Beck  YOB: 1980  Date of Visit: 11/1/2023      To Whom it May Concern:    Mike Vernon is under my professional care. Mike was seen in my office on 11/1/2023. Please excuse him from work for his appointment. If you have any questions or concerns, please don't hesitate to call.          Sincerely,          Julio Kowalski MD        \

## 2023-11-01 NOTE — ASSESSMENT & PLAN NOTE
BP still high - discussed medication changes  We can increase Coreg to 25 mg BID  Start amlodipine  Unable to do HCTZ given his Liddle's  Encouraged to also follow up with nephrology soon  Monitor BP at home closely  Discussed the consequences of uncontrolled HTN

## 2023-11-03 ENCOUNTER — TELEPHONE (OUTPATIENT)
Dept: NEPHROLOGY | Facility: CLINIC | Age: 43
End: 2023-11-03

## 2023-11-06 ENCOUNTER — TELEPHONE (OUTPATIENT)
Age: 43
End: 2023-11-06

## 2023-11-06 NOTE — TELEPHONE ENCOUNTER
Pt's wife called. She said the doctor referred her  to two different gastro doctors and she wants to know which one she should call. Please, let her know. Thank you.
Spoke with pt wife a recommended calling the most recent refer specialist. Patients wife had no further questions.
Yes

## 2023-11-14 DIAGNOSIS — E87.6 HYPOKALEMIA: ICD-10-CM

## 2023-11-14 RX ORDER — POTASSIUM CHLORIDE 1500 MG/1
1 TABLET, EXTENDED RELEASE ORAL 2 TIMES DAILY
Qty: 180 TABLET | Refills: 0 | Status: SHIPPED | OUTPATIENT
Start: 2023-11-14

## 2023-11-28 ENCOUNTER — TELEPHONE (OUTPATIENT)
Age: 43
End: 2023-11-28

## 2023-11-28 ENCOUNTER — PREP FOR PROCEDURE (OUTPATIENT)
Age: 43
End: 2023-11-28

## 2023-11-28 DIAGNOSIS — Z12.11 SCREENING FOR COLON CANCER: Primary | ICD-10-CM

## 2023-11-28 NOTE — TELEPHONE ENCOUNTER
Scheduled date of colonoscopy (as of today): 12/12/23  Physician performing colonoscopy: Guilherme Gaming  Location of colonoscopy: [facility]  Bowel prep reviewed with patient: esteban/luciano  Instructions reviewed with patient by: maggie  Clearances: n/a

## 2023-11-28 NOTE — TELEPHONE ENCOUNTER
11/28/23  Screened by: Lanny Thorne    Referring Provider BEVERLY     Pre- Screening: Body mass index is 23.96 kg/m². Has patient been referred for a routine screening Colonoscopy? yes  Is the patient between 43-73 years old? yes      Previous Colonoscopy no   If yes:    Date:     Facility:     Reason:       SCHEDULING STAFF: If the patient is between 45yrs-49yrs, please advise patient to confirm benefits/coverage with their insurance company for a routine screening colonoscopy, some insurance carriers will only cover at [address] [place] [address] or older. If the patient is over 66years old, please schedule an office visit. Does the patient want to see a Gastroenterologist prior to their procedure OR are they having any GI symptoms? no    Has the patient been hospitalized or had abdominal surgery in the past 6 months? no    Does the patient use supplemental oxygen? no    Does the patient take Coumadin, Lovenox, Plavix, Elliquis, Xarelto, or other blood thinning medication? no    Has the patient had a stroke, cardiac event, or stent placed in the past year? no    SCHEDULING STAFF: If patient answers NO to above questions, then schedule procedure. If patient answers YES to above questions, then schedule office appointment. If patient is between 45yrs - 49yrs, please advise patient that we will have to confirm benefits & coverage with their insurance company for a routine screening colonoscopy.

## 2023-12-05 ENCOUNTER — ANESTHESIA EVENT (OUTPATIENT)
Dept: ANESTHESIOLOGY | Facility: AMBULATORY SURGERY CENTER | Age: 43
End: 2023-12-05

## 2023-12-05 ENCOUNTER — ANESTHESIA (OUTPATIENT)
Dept: ANESTHESIOLOGY | Facility: AMBULATORY SURGERY CENTER | Age: 43
End: 2023-12-05

## 2023-12-08 ENCOUNTER — TELEPHONE (OUTPATIENT)
Dept: GASTROENTEROLOGY | Facility: CLINIC | Age: 43
End: 2023-12-08

## 2023-12-08 NOTE — TELEPHONE ENCOUNTER
Pt spouse called to r/s colonoscopy. Colonoscopy r/s to 1/5 with Dr. Mindi Amezquita. Resent prep instructions to pts email.

## 2023-12-13 ENCOUNTER — APPOINTMENT (EMERGENCY)
Dept: RADIOLOGY | Facility: HOSPITAL | Age: 43
End: 2023-12-13
Payer: COMMERCIAL

## 2023-12-13 ENCOUNTER — HOSPITAL ENCOUNTER (EMERGENCY)
Facility: HOSPITAL | Age: 43
Discharge: HOME | End: 2023-12-13
Attending: EMERGENCY MEDICINE
Payer: COMMERCIAL

## 2023-12-13 ENCOUNTER — TELEPHONE (OUTPATIENT)
Dept: NEPHROLOGY | Facility: CLINIC | Age: 43
End: 2023-12-13

## 2023-12-13 VITALS
WEIGHT: 152.56 LBS | TEMPERATURE: 98.1 F | RESPIRATION RATE: 12 BRPM | HEART RATE: 82 BPM | OXYGEN SATURATION: 99 % | SYSTOLIC BLOOD PRESSURE: 173 MMHG | BODY MASS INDEX: 23.89 KG/M2 | DIASTOLIC BLOOD PRESSURE: 104 MMHG

## 2023-12-13 DIAGNOSIS — R07.9 CHEST PAIN, UNSPECIFIED TYPE: Primary | ICD-10-CM

## 2023-12-13 LAB
ALBUMIN SERPL BCP-MCNC: 4.8 G/DL (ref 3.5–5)
ALP SERPL-CCNC: 80 U/L (ref 34–104)
ALT SERPL W P-5'-P-CCNC: 17 U/L (ref 7–52)
ANION GAP SERPL CALCULATED.3IONS-SCNC: 7 MMOL/L
AST SERPL W P-5'-P-CCNC: 19 U/L (ref 13–39)
BASOPHILS # BLD AUTO: 0.09 THOUSANDS/ÂΜL (ref 0–0.1)
BASOPHILS NFR BLD AUTO: 1 % (ref 0–1)
BILIRUB SERPL-MCNC: 0.88 MG/DL (ref 0.2–1)
BUN SERPL-MCNC: 17 MG/DL (ref 5–25)
CALCIUM SERPL-MCNC: 10.1 MG/DL (ref 8.4–10.2)
CARDIAC TROPONIN I PNL SERPL HS: 4 NG/L
CHLORIDE SERPL-SCNC: 101 MMOL/L (ref 96–108)
CO2 SERPL-SCNC: 30 MMOL/L (ref 21–32)
CREAT SERPL-MCNC: 1.37 MG/DL (ref 0.6–1.3)
D DIMER PPP FEU-MCNC: <0.27 UG/ML FEU
EOSINOPHIL # BLD AUTO: 0.6 THOUSAND/ÂΜL (ref 0–0.61)
EOSINOPHIL NFR BLD AUTO: 6 % (ref 0–6)
ERYTHROCYTE [DISTWIDTH] IN BLOOD BY AUTOMATED COUNT: 12.8 % (ref 11.6–15.1)
GFR SERPL CREATININE-BSD FRML MDRD: 61 ML/MIN/1.73SQ M
GLUCOSE SERPL-MCNC: 101 MG/DL (ref 65–140)
HCT VFR BLD AUTO: 48.3 % (ref 36.5–49.3)
HGB BLD-MCNC: 16.3 G/DL (ref 12–17)
IMM GRANULOCYTES # BLD AUTO: 0.03 THOUSAND/UL (ref 0–0.2)
IMM GRANULOCYTES NFR BLD AUTO: 0 % (ref 0–2)
LYMPHOCYTES # BLD AUTO: 2.56 THOUSANDS/ÂΜL (ref 0.6–4.47)
LYMPHOCYTES NFR BLD AUTO: 26 % (ref 14–44)
MCH RBC QN AUTO: 29.4 PG (ref 26.8–34.3)
MCHC RBC AUTO-ENTMCNC: 33.7 G/DL (ref 31.4–37.4)
MCV RBC AUTO: 87 FL (ref 82–98)
MONOCYTES # BLD AUTO: 0.93 THOUSAND/ÂΜL (ref 0.17–1.22)
MONOCYTES NFR BLD AUTO: 9 % (ref 4–12)
NEUTROPHILS # BLD AUTO: 5.69 THOUSANDS/ÂΜL (ref 1.85–7.62)
NEUTS SEG NFR BLD AUTO: 58 % (ref 43–75)
NRBC BLD AUTO-RTO: 0 /100 WBCS
PLATELET # BLD AUTO: 243 THOUSANDS/UL (ref 149–390)
PMV BLD AUTO: 9.5 FL (ref 8.9–12.7)
POTASSIUM SERPL-SCNC: 4.1 MMOL/L (ref 3.5–5.3)
PROT SERPL-MCNC: 7.6 G/DL (ref 6.4–8.4)
RBC # BLD AUTO: 5.55 MILLION/UL (ref 3.88–5.62)
SODIUM SERPL-SCNC: 138 MMOL/L (ref 135–147)
WBC # BLD AUTO: 9.9 THOUSAND/UL (ref 4.31–10.16)

## 2023-12-13 PROCEDURE — 93005 ELECTROCARDIOGRAM TRACING: CPT

## 2023-12-13 PROCEDURE — 84484 ASSAY OF TROPONIN QUANT: CPT | Performed by: PHYSICIAN ASSISTANT

## 2023-12-13 PROCEDURE — 99285 EMERGENCY DEPT VISIT HI MDM: CPT

## 2023-12-13 PROCEDURE — 85025 COMPLETE CBC W/AUTO DIFF WBC: CPT | Performed by: PHYSICIAN ASSISTANT

## 2023-12-13 PROCEDURE — 71046 X-RAY EXAM CHEST 2 VIEWS: CPT

## 2023-12-13 PROCEDURE — 85379 FIBRIN DEGRADATION QUANT: CPT | Performed by: PHYSICIAN ASSISTANT

## 2023-12-13 PROCEDURE — 80053 COMPREHEN METABOLIC PANEL: CPT | Performed by: PHYSICIAN ASSISTANT

## 2023-12-13 PROCEDURE — 99285 EMERGENCY DEPT VISIT HI MDM: CPT | Performed by: PHYSICIAN ASSISTANT

## 2023-12-13 PROCEDURE — 36415 COLL VENOUS BLD VENIPUNCTURE: CPT | Performed by: PHYSICIAN ASSISTANT

## 2023-12-14 LAB
ATRIAL RATE: 99 BPM
P AXIS: 77 DEGREES
PR INTERVAL: 142 MS
QRS AXIS: 74 DEGREES
QRSD INTERVAL: 64 MS
QT INTERVAL: 328 MS
QTC INTERVAL: 420 MS
T WAVE AXIS: 55 DEGREES
VENTRICULAR RATE: 99 BPM

## 2023-12-15 ENCOUNTER — OFFICE VISIT (OUTPATIENT)
Dept: NEPHROLOGY | Facility: CLINIC | Age: 43
End: 2023-12-15
Payer: COMMERCIAL

## 2023-12-15 VITALS — DIASTOLIC BLOOD PRESSURE: 94 MMHG | HEART RATE: 70 BPM | SYSTOLIC BLOOD PRESSURE: 154 MMHG

## 2023-12-15 DIAGNOSIS — I10 HYPERTENSION, UNSPECIFIED TYPE: Primary | ICD-10-CM

## 2023-12-15 DIAGNOSIS — E87.6 HYPOKALEMIA: ICD-10-CM

## 2023-12-15 DIAGNOSIS — I15.1 LIDDLE'S SYNDROME: ICD-10-CM

## 2023-12-15 DIAGNOSIS — I15.8 OTHER SECONDARY HYPERTENSION: ICD-10-CM

## 2023-12-15 PROCEDURE — 99214 OFFICE O/P EST MOD 30 MIN: CPT | Performed by: INTERNAL MEDICINE

## 2023-12-15 RX ORDER — VALSARTAN 160 MG/1
160 TABLET ORAL DAILY
Qty: 30 TABLET | Refills: 5 | Status: SHIPPED | OUTPATIENT
Start: 2023-12-15

## 2023-12-15 NOTE — PROGRESS NOTES
NEPHROLOGY PROGRESS NOTE    Olu Veatrice Sandifer 37 y.o. male MRN: [id number]  Unit/Bed#:  Encounter: [id number]  Reason for Consult hypertension and history of Liddle's syndrome    The patient is here for follow-up I have not seen him now since 2021. He states things have been going well with his family and work but he has been noticing the last few months his blood pressure has been fluctuating a lot and it is concerning him as there is values that are elevated. He has been compliant with his medications. He is unable to tolerate amlodipine so has not been taking that. He made the call to come in and try and regulate his blood pressure. ASSESSMENT/PLAN:  1. 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  He was on amiloride potassium supplementation. I only saw him the 1 time and then he is coming back with his blood pressure has been higher and he is concerned about that. 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 He is on amiloride and was on that prior to me meeting him and I have performed the last 2 years of potassiums have all but 1 been normal and the most recent this month was 4.1. At this point his potassium supplement and amiloride are controlling his potassium concentration. Liddle's syndrome as a cause of secondary hypertension but he does have a family history of hypertension as well. He is telling me that some values could be as high as 228 systolic and some to be 601 systolic. He is currently on carvedilol, amiloride and his blood pressures are elevated. He does not seem to tolerate a calcium channel blocker. Patients with Liddle's syndrome generally have low renin and low aldosterone but ongoing to give a trial of valsartan.     Continue current antihypertensives  Valsartan 160 mg p.o. daily  Monitor blood pressure in the after 1-2 weeks on the medication obtain blood work I have provided. I will then contact the patient to see how the blood pressure is doing and if there is no response may consider switching to doxazosin. I have spent a total time of 35 minutes on 12/16/23 in caring for this patient including Diagnostic results, Patient and family education, Impressions, Reviewing / ordering tests, medicine, procedures  , and Obtaining or reviewing history  . SUBJECTIVE:  Review of Systems   Constitutional: Negative for chills, diaphoresis, fever and malaise/fatigue. HENT: Negative. Eyes: Negative. Cardiovascular:  Negative for chest pain, dyspnea on exertion, leg swelling and orthopnea. Respiratory: Negative. Negative for cough, shortness of breath, sputum production and wheezing. Gastrointestinal:  Negative for abdominal pain, diarrhea, nausea and vomiting. Genitourinary:  Negative for dysuria, flank pain, hematuria and incomplete emptying. Neurological:  Negative for dizziness, focal weakness, headaches and weakness. Psychiatric/Behavioral:  Negative for altered mental status, depression, hallucinations and hypervigilance. OBJECTIVE:  Current Weight:    [email or web address]:     Blood pressure 154/94, pulse 70. , There is no height or weight on file to calculate BMI. [unfilled]    Physical Exam: /94 (BP Location: Left arm, Patient Position: Sitting)   Pulse 70   Physical Exam  Constitutional:       General: He is not in acute distress. Appearance: He is not toxic-appearing or diaphoretic. HENT:      Head: Normocephalic and atraumatic. Nose: Nose normal.      Mouth/Throat:      Mouth: Mucous membranes are moist.   Eyes:      General: No scleral icterus. Extraocular Movements: Extraocular movements intact. Cardiovascular:      Rate and Rhythm: Normal rate and regular rhythm. Heart sounds: No friction rub. No gallop.    Pulmonary:      Effort: Pulmonary effort is normal. No respiratory distress. Breath sounds: No wheezing, rhonchi or rales. Abdominal:      General: Bowel sounds are normal. There is no distension. Palpations: Abdomen is soft. Tenderness: There is no abdominal tenderness. There is no rebound. Musculoskeletal:      Cervical back: Normal range of motion and neck supple. Neurological:      General: No focal deficit present. Mental Status: He is alert and oriented to person, place, and time. Mental status is at baseline. Psychiatric:         Mood and Affect: Mood normal.         Behavior: Behavior normal.         Thought Content:  Thought content normal.         Judgment: Judgment normal.         Medications:    Current Outpatient Medications:     AMILoride 5 mg tablet, Take 2 tablets (10 mg total) by mouth 2 (two) times a day PATIENT TAKES ONE TABLET BID, Disp: 360 tablet, Rfl: 0    ASHWAGANDHA PO, Take by mouth daily, Disp: , Rfl:     carvedilol (COREG) 25 mg tablet, Take 1 tablet (25 mg total) by mouth 2 (two) times a day with meals, Disp: 180 tablet, Rfl: 0    Potassium Chloride ER 20 MEQ TBCR, TAKE 1 TABLET BY MOUTH 2 TIMES A DAY., Disp: 180 tablet, Rfl: 0    valsartan (DIOVAN) 160 mg tablet, Take 1 tablet (160 mg total) by mouth daily, Disp: 30 tablet, Rfl: 5    amLODIPine (NORVASC) 5 mg tablet, Take 1 tablet (5 mg total) by mouth daily, Disp: 90 tablet, Rfl: 0    Laboratory Results:  Lab Results   Component Value Date    WBC 9.90 12/13/2023    HGB 16.3 12/13/2023    HCT 48.3 12/13/2023    MCV 87 12/13/2023     12/13/2023     Lab Results   Component Value Date    SODIUM 138 12/13/2023    K 4.1 12/13/2023     12/13/2023    CO2 30 12/13/2023    BUN 17 12/13/2023    CREATININE 1.37 (H) 12/13/2023    GLUC 101 12/13/2023    CALCIUM 10.1 12/13/2023     Lab Results   Component Value Date    CALCIUM 10.1 12/13/2023     No results found for: "LABPROT"

## 2023-12-15 NOTE — LETTER
December 16, 2023     Kinga Gerber, [address] [place] [address] [facility]   [address] [place]  [id number]    Patient: Hamilton Libman   YOB: 1980   Date of Visit: 12/15/2023       Dear Dr. Hetaher Barger:    Thank you for referring Hamilton Libman to me for evaluation. Below are my notes for this consultation. If you have questions, please do not hesitate to call me. I look forward to following your patient along with you. Sincerely,        Ryan Sam MD        CC: No Recipients    Ryan Sam MD  12/16/2023  3:16 PM  Sign when Signing Visit  NEPHROLOGY PROGRESS NOTE    Hamilton Libman 37 y.o. male MRN: [id number]  Unit/Bed#:  Encounter: [id number]  Reason for Consult hypertension and history of Liddle's syndrome    The patient is here for follow-up I have not seen him now since 2021. He states things have been going well with his family and work but he has been noticing the last few months his blood pressure has been fluctuating a lot and it is concerning him as there is values that are elevated. He has been compliant with his medications. He is unable to tolerate amlodipine so has not been taking that. He made the call to come in and try and regulate his blood pressure. ASSESSMENT/PLAN:  1. 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  He was on amiloride potassium supplementation. I only saw him the 1 time and then he is coming back with his blood pressure has been higher and he is concerned about that. 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 He is on amiloride and was on that prior to me meeting him and I have performed the last 2 years of potassiums have all but 1 been normal and the most recent this month was 4.1. At this point his potassium supplement and amiloride are controlling his potassium concentration.     Liddle's syndrome as a cause of secondary hypertension but he does have a family history of hypertension as well. He is telling me that some values could be as high as 548 systolic and some to be 550 systolic. He is currently on carvedilol, amiloride and his blood pressures are elevated. He does not seem to tolerate a calcium channel blocker. Patients with Liddle's syndrome generally have low renin and low aldosterone but ongoing to give a trial of valsartan. Continue current antihypertensives  Valsartan 160 mg p.o. daily  Monitor blood pressure in the after 1-2 weeks on the medication obtain blood work I have provided. I will then contact the patient to see how the blood pressure is doing and if there is no response may consider switching to doxazosin. I have spent a total time of 35 minutes on 12/16/23 in caring for this patient including Diagnostic results, Patient and family education, Impressions, Reviewing / ordering tests, medicine, procedures  , and Obtaining or reviewing history  . SUBJECTIVE:  Review of Systems   Constitutional: Negative for chills, diaphoresis, fever and malaise/fatigue. HENT: Negative. Eyes: Negative. Cardiovascular:  Negative for chest pain, dyspnea on exertion, leg swelling and orthopnea. Respiratory: Negative. Negative for cough, shortness of breath, sputum production and wheezing. Gastrointestinal:  Negative for abdominal pain, diarrhea, nausea and vomiting. Genitourinary:  Negative for dysuria, flank pain, hematuria and incomplete emptying. Neurological:  Negative for dizziness, focal weakness, headaches and weakness. Psychiatric/Behavioral:  Negative for altered mental status, depression, hallucinations and hypervigilance. OBJECTIVE:  Current Weight:    [email or web address]:     Blood pressure 154/94, pulse 70. , There is no height or weight on file to calculate BMI.     [unfilled]    Physical Exam: /94 (BP Location: Left arm, Patient Position: Sitting)   Pulse 70   Physical Exam  Constitutional:       General: He is not in acute distress. Appearance: He is not toxic-appearing or diaphoretic. HENT:      Head: Normocephalic and atraumatic. Nose: Nose normal.      Mouth/Throat:      Mouth: Mucous membranes are moist.   Eyes:      General: No scleral icterus. Extraocular Movements: Extraocular movements intact. Cardiovascular:      Rate and Rhythm: Normal rate and regular rhythm. Heart sounds: No friction rub. No gallop. Pulmonary:      Effort: Pulmonary effort is normal. No respiratory distress. Breath sounds: No wheezing, rhonchi or rales. Abdominal:      General: Bowel sounds are normal. There is no distension. Palpations: Abdomen is soft. Tenderness: There is no abdominal tenderness. There is no rebound. Musculoskeletal:      Cervical back: Normal range of motion and neck supple. Neurological:      General: No focal deficit present. Mental Status: He is alert and oriented to person, place, and time. Mental status is at baseline. Psychiatric:         Mood and Affect: Mood normal.         Behavior: Behavior normal.         Thought Content:  Thought content normal.         Judgment: Judgment normal.         Medications:    Current Outpatient Medications:   •  AMILoride 5 mg tablet, Take 2 tablets (10 mg total) by mouth 2 (two) times a day PATIENT TAKES ONE TABLET BID, Disp: 360 tablet, Rfl: 0  •  ASHWAGANDHA PO, Take by mouth daily, Disp: , Rfl:   •  carvedilol (COREG) 25 mg tablet, Take 1 tablet (25 mg total) by mouth 2 (two) times a day with meals, Disp: 180 tablet, Rfl: 0  •  Potassium Chloride ER 20 MEQ TBCR, TAKE 1 TABLET BY MOUTH 2 TIMES A DAY., Disp: 180 tablet, Rfl: 0  •  valsartan (DIOVAN) 160 mg tablet, Take 1 tablet (160 mg total) by mouth daily, Disp: 30 tablet, Rfl: 5  •  amLODIPine (NORVASC) 5 mg tablet, Take 1 tablet (5 mg total) by mouth daily, Disp: 90 tablet, Rfl: 0    Laboratory Results:  Lab Results   Component Value Date    WBC 9.90 12/13/2023    HGB 16.3 12/13/2023    HCT 48.3 12/13/2023    MCV 87 12/13/2023     12/13/2023     Lab Results   Component Value Date    SODIUM 138 12/13/2023    K 4.1 12/13/2023     12/13/2023    CO2 30 12/13/2023    BUN 17 12/13/2023    CREATININE 1.37 (H) 12/13/2023    GLUC 101 12/13/2023    CALCIUM 10.1 12/13/2023     Lab Results   Component Value Date    CALCIUM 10.1 12/13/2023     No results found for: "LABPROT"

## 2023-12-15 NOTE — PATIENT INSTRUCTIONS
You are here for follow-up it was nice to see you you came back because you are blood pressure has been fluctuating and you are concerned because it is having high values. 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 On amiloride your potassium is staying normal so that is working possibly the blood pressure is high may be due to the family history on top of it and you are getting a little bit older. Continue amiloride and carvedilol. You tell me you are not able to tolerate the amlodipine. Start valsartan 160 mg once a day with your other medication  Monitor your blood pressure if you have any side effects or problems stop the medicine and call me    After you are on the medicine for a week or to obtain the blood work I just want to check your potassium and kidney function I will call you with results and then ask how the blood pressure is doing at that time.

## 2023-12-17 ENCOUNTER — APPOINTMENT (EMERGENCY)
Dept: CT IMAGING | Facility: HOSPITAL | Age: 43
End: 2023-12-17
Payer: COMMERCIAL

## 2023-12-17 ENCOUNTER — HOSPITAL ENCOUNTER (EMERGENCY)
Facility: HOSPITAL | Age: 43
Discharge: HOME | End: 2023-12-17
Attending: INTERNAL MEDICINE | Admitting: INTERNAL MEDICINE
Payer: COMMERCIAL

## 2023-12-17 ENCOUNTER — APPOINTMENT (EMERGENCY)
Dept: RADIOLOGY | Facility: HOSPITAL | Age: 43
End: 2023-12-17
Payer: COMMERCIAL

## 2023-12-17 VITALS
OXYGEN SATURATION: 98 % | DIASTOLIC BLOOD PRESSURE: 85 MMHG | RESPIRATION RATE: 14 BRPM | SYSTOLIC BLOOD PRESSURE: 143 MMHG | TEMPERATURE: 97.4 F | HEART RATE: 97 BPM

## 2023-12-17 DIAGNOSIS — R55 SYNCOPE: ICD-10-CM

## 2023-12-17 DIAGNOSIS — Z53.29 LEFT AGAINST MEDICAL ADVICE: Primary | ICD-10-CM

## 2023-12-17 DIAGNOSIS — N17.9 ACUTE RENAL FAILURE (ARF) (HCC): ICD-10-CM

## 2023-12-17 LAB
ALBUMIN SERPL BCP-MCNC: 4.3 G/DL (ref 3.5–5)
ALP SERPL-CCNC: 73 U/L (ref 34–104)
ALT SERPL W P-5'-P-CCNC: 12 U/L (ref 7–52)
ANION GAP SERPL CALCULATED.3IONS-SCNC: 10 MMOL/L
AST SERPL W P-5'-P-CCNC: 18 U/L (ref 13–39)
ATRIAL RATE: 119 BPM
BASOPHILS # BLD AUTO: 0.1 THOUSANDS/ÂΜL (ref 0–0.1)
BASOPHILS NFR BLD AUTO: 1 % (ref 0–1)
BILIRUB SERPL-MCNC: 0.67 MG/DL (ref 0.2–1)
BNP SERPL-MCNC: 4 PG/ML (ref 0–100)
BUN SERPL-MCNC: 27 MG/DL (ref 5–25)
CALCIUM SERPL-MCNC: 9.4 MG/DL (ref 8.4–10.2)
CARDIAC TROPONIN I PNL SERPL HS: 4 NG/L
CHLORIDE SERPL-SCNC: 101 MMOL/L (ref 96–108)
CO2 SERPL-SCNC: 25 MMOL/L (ref 21–32)
CREAT SERPL-MCNC: 2.06 MG/DL (ref 0.6–1.3)
EOSINOPHIL # BLD AUTO: 0.66 THOUSAND/ÂΜL (ref 0–0.61)
EOSINOPHIL NFR BLD AUTO: 5 % (ref 0–6)
ERYTHROCYTE [DISTWIDTH] IN BLOOD BY AUTOMATED COUNT: 12.7 % (ref 11.6–15.1)
FLUAV RNA RESP QL NAA+PROBE: NEGATIVE
FLUBV RNA RESP QL NAA+PROBE: NEGATIVE
GFR SERPL CREATININE-BSD FRML MDRD: 37 ML/MIN/1.73SQ M
GLUCOSE SERPL-MCNC: 151 MG/DL (ref 65–140)
HCT VFR BLD AUTO: 44.2 % (ref 36.5–49.3)
HGB BLD-MCNC: 15.1 G/DL (ref 12–17)
IMM GRANULOCYTES # BLD AUTO: 0.05 THOUSAND/UL (ref 0–0.2)
IMM GRANULOCYTES NFR BLD AUTO: 0 % (ref 0–2)
LYMPHOCYTES # BLD AUTO: 4.49 THOUSANDS/ÂΜL (ref 0.6–4.47)
LYMPHOCYTES NFR BLD AUTO: 32 % (ref 14–44)
MAGNESIUM SERPL-MCNC: 2.3 MG/DL (ref 1.9–2.7)
MCH RBC QN AUTO: 29.7 PG (ref 26.8–34.3)
MCHC RBC AUTO-ENTMCNC: 34.2 G/DL (ref 31.4–37.4)
MCV RBC AUTO: 87 FL (ref 82–98)
MONOCYTES # BLD AUTO: 1.43 THOUSAND/ÂΜL (ref 0.17–1.22)
MONOCYTES NFR BLD AUTO: 10 % (ref 4–12)
NEUTROPHILS # BLD AUTO: 7.27 THOUSANDS/ÂΜL (ref 1.85–7.62)
NEUTS SEG NFR BLD AUTO: 52 % (ref 43–75)
NRBC BLD AUTO-RTO: 0 /100 WBCS
P AXIS: 78 DEGREES
PLATELET # BLD AUTO: 272 THOUSANDS/UL (ref 149–390)
PMV BLD AUTO: 9.7 FL (ref 8.9–12.7)
POTASSIUM SERPL-SCNC: 3.8 MMOL/L (ref 3.5–5.3)
PR INTERVAL: 150 MS
PROT SERPL-MCNC: 6.7 G/DL (ref 6.4–8.4)
QRS AXIS: 74 DEGREES
QRSD INTERVAL: 66 MS
QT INTERVAL: 330 MS
QTC INTERVAL: 464 MS
RBC # BLD AUTO: 5.08 MILLION/UL (ref 3.88–5.62)
RSV RNA RESP QL NAA+PROBE: NEGATIVE
SARS-COV-2 RNA RESP QL NAA+PROBE: NEGATIVE
SODIUM SERPL-SCNC: 136 MMOL/L (ref 135–147)
T WAVE AXIS: 62 DEGREES
VENTRICULAR RATE: 119 BPM
WBC # BLD AUTO: 14 THOUSAND/UL (ref 4.31–10.16)

## 2023-12-17 PROCEDURE — 99285 EMERGENCY DEPT VISIT HI MDM: CPT

## 2023-12-17 PROCEDURE — 85025 COMPLETE CBC W/AUTO DIFF WBC: CPT | Performed by: PHYSICIAN ASSISTANT

## 2023-12-17 PROCEDURE — 96361 HYDRATE IV INFUSION ADD-ON: CPT

## 2023-12-17 PROCEDURE — G1004 CDSM NDSC: HCPCS

## 2023-12-17 PROCEDURE — 93005 ELECTROCARDIOGRAM TRACING: CPT

## 2023-12-17 PROCEDURE — 84484 ASSAY OF TROPONIN QUANT: CPT | Performed by: PHYSICIAN ASSISTANT

## 2023-12-17 PROCEDURE — 83735 ASSAY OF MAGNESIUM: CPT | Performed by: PHYSICIAN ASSISTANT

## 2023-12-17 PROCEDURE — 80053 COMPREHEN METABOLIC PANEL: CPT | Performed by: PHYSICIAN ASSISTANT

## 2023-12-17 PROCEDURE — 70450 CT HEAD/BRAIN W/O DYE: CPT

## 2023-12-17 PROCEDURE — 83880 ASSAY OF NATRIURETIC PEPTIDE: CPT | Performed by: EMERGENCY MEDICINE

## 2023-12-17 PROCEDURE — 99285 EMERGENCY DEPT VISIT HI MDM: CPT | Performed by: PHYSICIAN ASSISTANT

## 2023-12-17 PROCEDURE — 96374 THER/PROPH/DIAG INJ IV PUSH: CPT

## 2023-12-17 PROCEDURE — 71045 X-RAY EXAM CHEST 1 VIEW: CPT

## 2023-12-17 PROCEDURE — 0241U HB NFCT DS VIR RESP RNA 4 TRGT: CPT | Performed by: PHYSICIAN ASSISTANT

## 2023-12-17 PROCEDURE — 36415 COLL VENOUS BLD VENIPUNCTURE: CPT | Performed by: PHYSICIAN ASSISTANT

## 2023-12-17 RX ORDER — ONDANSETRON 2 MG/ML
4 INJECTION INTRAMUSCULAR; INTRAVENOUS ONCE
Status: COMPLETED | OUTPATIENT
Start: 2023-12-17 | End: 2023-12-17

## 2023-12-17 RX ADMIN — SODIUM CHLORIDE 1000 ML: 0.9 INJECTION, SOLUTION INTRAVENOUS at 20:22

## 2023-12-17 RX ADMIN — ONDANSETRON 4 MG: 2 INJECTION INTRAMUSCULAR; INTRAVENOUS at 20:21

## 2023-12-17 NOTE — Clinical Note
Mike Acevedo was seen and treated in our emergency department on 12/17/2023.                Diagnosis:     Mike  may return to work on return date.    He may return on this date: 12/19/2023         If you have any questions or concerns, please don't hesitate to call.      Tra Green MD    ______________________________           _______________          _______________  Hospital Representative                              Date                                Time

## 2023-12-18 ENCOUNTER — TELEPHONE (OUTPATIENT)
Dept: NEPHROLOGY | Facility: CLINIC | Age: 43
End: 2023-12-18

## 2023-12-18 NOTE — TELEPHONE ENCOUNTER
Received call from patient's spouse Maira stating yesterday evening patient took BP and was 155/71. About ten minutes later BP dropped to 77/46 and he passed out. Patient then went to ER and his BP was 215/143. By the time he was discharged his BP was 143/85. Recommended by ER to have Dr. Muniz review dosage of BP medications. Best contact number is for Maira [phone]

## 2023-12-18 NOTE — DISCHARGE INSTRUCTIONS
Blood pressure was elevated in the emergency department.  You decided to leave the emergency department against medical advice.  You are at risk of death, disability, and worsening of condition.  You may return to the emergency department at any time, with any concerns.  Follow up with your primary doctor/outpatient providers, and return to the emergency department for new or worsening symptoms.      CT BRAIN - WITHOUT CONTRAST     INDICATION:   syncope. History of Liddle's syndrome     COMPARISON: May 28, 2018 [facility]     TECHNIQUE:  CT examination of the brain was performed.  Multiplanar 2D reformatted images were created from the source data.     Radiation dose length product (DLP) for this visit:  928.4 mGy-cm .  This examination, like all CT scans performed in the [facility], was performed utilizing techniques to minimize radiation dose exposure, including the use of iterative   reconstruction and automated exposure control.     IMAGE QUALITY:  Diagnostic.     FINDINGS:     PARENCHYMA: Decreased attenuation is noted in periventricular and subcortical white matter demonstrating an appearance that is statistically most likely to represent mild microangiopathic change.  Relatively dense midline falx are identified. No evidence of hemorrhage.  No CT signs of acute infarction.  No intracranial mass, mass effect or midline shift.  No acute parenchymal hemorrhage. Punctate basal ganglion calcifications.     VENTRICLES AND EXTRA-AXIAL SPACES:  Normal for the patient's age. Small cavum septum pellucidum and septum vergae     VISUALIZED ORBITS: Normal visualized orbits.     PARANASAL SINUSES: Mild mucosal thickening of the visualized paranasal sinuses.     CALVARIUM AND EXTRACRANIAL SOFT TISSUES:  Normal.        IMPRESSION:  No acute intracranial abnormality.              CT BRAIN - WITHOUT CONTRAST     INDICATION:   syncope. History of Liddle's syndrome     COMPARISON: May 28, 2018 [facility]     TECHNIQUE:  CT examination of the brain was performed.  Multiplanar 2D reformatted images were created from the source data.     Radiation dose length product (DLP) for this visit:  928.4 mGy-cm .  This examination, like all CT scans performed in the [facility], was performed utilizing techniques to minimize radiation dose exposure, including the use of iterative   reconstruction and automated exposure control.     IMAGE QUALITY:  Diagnostic.     FINDINGS:     PARENCHYMA: Decreased attenuation is noted in periventricular and subcortical white matter demonstrating an appearance that is statistically most likely to represent mild microangiopathic change.  Relatively dense midline falx are identified. No evidence of hemorrhage.  No CT signs of acute infarction.  No intracranial mass, mass effect or midline shift.  No acute parenchymal hemorrhage. Punctate basal ganglion calcifications.     VENTRICLES AND EXTRA-AXIAL SPACES:  Normal for the patient's age. Small cavum septum pellucidum and septum vergae     VISUALIZED ORBITS: Normal visualized orbits.     PARANASAL SINUSES: Mild mucosal thickening of the visualized paranasal sinuses.     CALVARIUM AND EXTRACRANIAL SOFT TISSUES:  Normal.        IMPRESSION:  No acute intracranial abnormality.

## 2023-12-18 NOTE — ED CARE HANDOFF
Emergency Department Sign Out Note        Signout and transfer of care from my colleague, IMAN Arriaza. See Separate Emergency Department note.     The patient, Mike Acevedo, was evaluated by the previous provider for syncope.    Labs Reviewed   CBC AND DIFFERENTIAL - Abnormal       Result Value Ref Range Status    WBC 14.00 (*) 4.31 - 10.16 Thousand/uL Final    RBC 5.08  3.88 - 5.62 Million/uL Final    Hemoglobin 15.1  12.0 - 17.0 g/dL Final    Hematocrit 44.2  36.5 - 49.3 % Final    MCV 87  82 - 98 fL Final    MCH 29.7  26.8 - 34.3 pg Final    MCHC 34.2  31.4 - 37.4 g/dL Final    RDW 12.7  11.6 - 15.1 % Final    MPV 9.7  8.9 - 12.7 fL Final    Platelets 272  149 - 390 Thousands/uL Final    nRBC 0  /100 WBCs Final    Neutrophils Relative 52  43 - 75 % Final    Immat GRANS % 0  0 - 2 % Final    Lymphocytes Relative 32  14 - 44 % Final    Monocytes Relative 10  4 - 12 % Final    Eosinophils Relative 5  0 - 6 % Final    Basophils Relative 1  0 - 1 % Final    Neutrophils Absolute 7.27  1.85 - 7.62 Thousands/µL Final    Immature Grans Absolute 0.05  0.00 - 0.20 Thousand/uL Final    Lymphocytes Absolute 4.49 (*) 0.60 - 4.47 Thousands/µL Final    Monocytes Absolute 1.43 (*) 0.17 - 1.22 Thousand/µL Final    Eosinophils Absolute 0.66 (*) 0.00 - 0.61 Thousand/µL Final    Basophils Absolute 0.10  0.00 - 0.10 Thousands/µL Final   COMPREHENSIVE METABOLIC PANEL - Abnormal    Sodium 136  135 - 147 mmol/L Final    Potassium 3.8  3.5 - 5.3 mmol/L Final    Chloride 101  96 - 108 mmol/L Final    CO2 25  21 - 32 mmol/L Final    ANION GAP 10  mmol/L Final    BUN 27 (*) 5 - 25 mg/dL Final    Creatinine 2.06 (*) 0.60 - 1.30 mg/dL Final    Comment: Standardized to IDMS reference method    Glucose 151 (*) 65 - 140 mg/dL Final    Comment: If the patient is fasting, the ADA then defines impaired fasting glucose as > 100 mg/dL and diabetes as > or equal to 123 mg/dL.    Calcium 9.4  8.4 - 10.2 mg/dL Final    AST 18  13 - 39 U/L Final    Comment:  "Slightly Hemolyzed:Results may be affected.    ALT 12  7 - 52 U/L Final    Comment: Specimen collection should occur prior to Sulfasalazine administration due to the potential for falsely depressed results.     Alkaline Phosphatase 73  34 - 104 U/L Final    Total Protein 6.7  6.4 - 8.4 g/dL Final    Albumin 4.3  3.5 - 5.0 g/dL Final    Total Bilirubin 0.67  0.20 - 1.00 mg/dL Final    Comment: Use of this assay is not recommended for patients undergoing treatment with eltrombopag due to the potential for falsely elevated results.  N-acetyl-p-benzoquinone imine (metabolite of Acetaminophen) will generate erroneously low results in samples for patients that have taken an overdose of Acetaminophen.    eGFR 37  ml/min/1.73sq m Final    Narrative:     National Kidney Disease Foundation guidelines for Chronic Kidney Disease (CKD):     Stage 1 with normal or high GFR (GFR > 90 mL/min/1.73 square meters)    Stage 2 Mild CKD (GFR = 60-89 mL/min/1.73 square meters)    Stage 3A Moderate CKD (GFR = 45-59 mL/min/1.73 square meters)    Stage 3B Moderate CKD (GFR = 30-44 mL/min/1.73 square meters)    Stage 4 Severe CKD (GFR = 15-29 mL/min/1.73 square meters)    Stage 5 End Stage CKD (GFR <15 mL/min/1.73 square meters)  Note: GFR calculation is accurate only with a steady state creatinine   MAGNESIUM - Normal    Magnesium 2.3  1.9 - 2.7 mg/dL Final   HS TROPONIN I 0HR - Normal    hs TnI 0hr 4  \"Refer to ACS Flowchart\"- see link ng/L Final    Comment:                                              Initial (time 0) result  If >=50 ng/L, Myocardial injury suggested ;  Type of myocardial injury and treatment strategy  to be determined.  If 5-49 ng/L, a delta result at 2 hours and or 4 hours will be needed to further evaluate.  If <4 ng/L, and chest pain has been >3 hours since onset, patient may qualify for discharge based on the HEART score in the ED.  If <5 ng/L and <3hours since onset of chest pain, a delta result at 2 hours will be " needed to further evaluate.    HS Troponin 99th Percentile URL of a Health Population=12 ng/L with a 95% Confidence Interval of 8-18 ng/L.    Second Troponin (time 2 hours)  If calculated delta >= 20 ng/L,  Myocardial injury suggested ; Type of myocardial injury and treatment strategy to be determined.  If 5-49 ng/L and the calculated delta is 5-19 ng/L, consult medical service for evaluation.  Continue evaluation for ischemia on ecg and other possible etiology and repeat hs troponin at 4 hours.  If delta is <5 ng/L at 2 hours, consider discharge based on risk stratification via the HEART score (if in ED), or BLAISE risk score in IP/Observation.    HS Troponin 99th Percentile URL of a Health Population=12 ng/L with a 95% Confidence Interval of 8-18 ng/L.   COVID19, INFLUENZA A/B, RSV PCR, [facility]   UA W REFLEX TO MICROSCOPIC WITH REFLEX TO CULTURE   HS TROPONIN I 2HR           CT head is pending. Plan to admit patient for further evaluation and treatment, with concern for syncope, acute renal failure.    CT head showed no acute intracranial abnormality.  Patient declined admission, and requested to leave the emergency department against medical advice.  Patient was informed of the risk of death, disability, and worsening of condition.  Patient was informed that he may return to the emergency department at any time, with any concerns.                  ED Course as of 12/17/23 2132   Sun Dec 17, 2023   2122 CT head-    IMPRESSION:  No acute intracranial abnormality.          Procedures  Medical Decision Making  Amount and/or Complexity of Data Reviewed  Labs: ordered.  Radiology: ordered and independent interpretation performed.    Risk  Prescription drug management.            Disposition  Final diagnoses:   Syncope   Acute renal failure (ARF) ([facility])     Time reflects when diagnosis was documented in both MDM as applicable and the Disposition within this note       Time User Action Codes Description Comment    12/17/2023   9:01 PM Chika Arriaza [[id number]] Syncope     12/17/2023  9:02 PM Tra Green [N17.9] Acute renal failure (ARF) (HCC)           ED Disposition       None          Follow-up Information    None       Patient's Medications   Discharge Prescriptions    No medications on file     No discharge procedures on file.       ED Provider  Electronically Signed by     Tra Green MD  12/17/23 2105       Tra Green MD  12/17/23 2132

## 2023-12-19 RX ORDER — CARVEDILOL 6.25 MG/1
6.25 TABLET ORAL 2 TIMES DAILY
COMMUNITY

## 2023-12-19 NOTE — PROGRESS NOTES
The patient had gone to the emergency room because he had a syncopal event when his blood pressure dropped to 70.  He was on carvedilol, amiloride and started valsartan.  His blood pressure then went up.  His creatinine had increased to 2.  They gave him some IV fluids and told him he was dehydrated.    When I spoke to him and his wife today they informed me that he was only taking carvedilol 6.25 once a day and I thought he was taking 25 mg twice a day.  At this point I informed him that carvedilol is a twice a day medication due to the weights metabolized and may be the effect is wearing off or potentially he needs more medication.  In our discussion they tell me when he took to 25 mg twice a day his blood pressure went low so it is possible we just may need to titrate this medication up and use it twice a day.    I have to take carvedilol 6.25 mg twice a day  Continue amiloride at current dose  Stop valsartan     I will call him in a couple days to see how his pressure is doing and then we may need to just consider working and titrating the carvedilol if it is not well-controlled.    I will also have him repeat a BMP likely next week to make sure kidney function went back to normal.

## 2023-12-19 NOTE — TELEPHONE ENCOUNTER
Pt's spouse call again and stated pt is not doing well. She said pt is light headed, dizzy, sometimes confused, his BP is high one minute then low the next. Please advise.

## 2023-12-19 NOTE — TELEPHONE ENCOUNTER
Pts wife called regarding Pt. Is requesting a call back from Dr Muniz. I let her know he was off yesterday and that's why they didn't here anything.   His wife explained that pt is still dizzy and needs to know what next steps need to be made. Pt isnt able to go to work or drive bc of the dizziness.

## 2023-12-22 ENCOUNTER — ANESTHESIA EVENT (OUTPATIENT)
Dept: ANESTHESIOLOGY | Facility: AMBULATORY SURGERY CENTER | Age: 43
End: 2023-12-22

## 2023-12-22 ENCOUNTER — ANESTHESIA (OUTPATIENT)
Dept: ANESTHESIOLOGY | Facility: AMBULATORY SURGERY CENTER | Age: 43
End: 2023-12-22

## 2023-12-27 ENCOUNTER — HOSPITAL ENCOUNTER (EMERGENCY)
Facility: HOSPITAL | Age: 43
Discharge: HOME | End: 2023-12-27
Attending: EMERGENCY MEDICINE
Payer: COMMERCIAL

## 2023-12-27 ENCOUNTER — TELEPHONE (OUTPATIENT)
Dept: NEPHROLOGY | Facility: CLINIC | Age: 43
End: 2023-12-27

## 2023-12-27 VITALS
BODY MASS INDEX: 23.89 KG/M2 | TEMPERATURE: 97.6 F | RESPIRATION RATE: 16 BRPM | OXYGEN SATURATION: 100 % | DIASTOLIC BLOOD PRESSURE: 98 MMHG | HEART RATE: 83 BPM | SYSTOLIC BLOOD PRESSURE: 160 MMHG | WEIGHT: 152.56 LBS

## 2023-12-27 DIAGNOSIS — I10 HYPERTENSION, UNSPECIFIED TYPE: ICD-10-CM

## 2023-12-27 DIAGNOSIS — I10 UNCONTROLLED HYPERTENSION: Primary | ICD-10-CM

## 2023-12-27 DIAGNOSIS — I10 CHRONIC HYPERTENSION: ICD-10-CM

## 2023-12-27 DIAGNOSIS — I10 PRIMARY HYPERTENSION: Primary | ICD-10-CM

## 2023-12-27 LAB
2HR DELTA HS TROPONIN: -2 NG/L
ALBUMIN SERPL BCP-MCNC: 4.4 G/DL (ref 3.5–5)
ALP SERPL-CCNC: 62 U/L (ref 34–104)
ALT SERPL W P-5'-P-CCNC: 13 U/L (ref 7–52)
ANION GAP SERPL CALCULATED.3IONS-SCNC: 5 MMOL/L
AST SERPL W P-5'-P-CCNC: 13 U/L (ref 13–39)
ATRIAL RATE: 82 BPM
BASOPHILS # BLD AUTO: 0.06 THOUSANDS/ÂΜL (ref 0–0.1)
BASOPHILS NFR BLD AUTO: 1 % (ref 0–1)
BILIRUB SERPL-MCNC: 0.67 MG/DL (ref 0.2–1)
BUN SERPL-MCNC: 15 MG/DL (ref 5–25)
CALCIUM SERPL-MCNC: 9.6 MG/DL (ref 8.4–10.2)
CARDIAC TROPONIN I PNL SERPL HS: 3 NG/L
CARDIAC TROPONIN I PNL SERPL HS: 5 NG/L
CHLORIDE SERPL-SCNC: 102 MMOL/L (ref 96–108)
CO2 SERPL-SCNC: 30 MMOL/L (ref 21–32)
CREAT SERPL-MCNC: 1.22 MG/DL (ref 0.6–1.3)
EOSINOPHIL # BLD AUTO: 0.51 THOUSAND/ÂΜL (ref 0–0.61)
EOSINOPHIL NFR BLD AUTO: 6 % (ref 0–6)
ERYTHROCYTE [DISTWIDTH] IN BLOOD BY AUTOMATED COUNT: 12.7 % (ref 11.6–15.1)
GFR SERPL CREATININE-BSD FRML MDRD: 71 ML/MIN/1.73SQ M
GLUCOSE SERPL-MCNC: 112 MG/DL (ref 65–140)
HCT VFR BLD AUTO: 43 % (ref 36.5–49.3)
HGB BLD-MCNC: 14.3 G/DL (ref 12–17)
IMM GRANULOCYTES # BLD AUTO: 0.02 THOUSAND/UL (ref 0–0.2)
IMM GRANULOCYTES NFR BLD AUTO: 0 % (ref 0–2)
LYMPHOCYTES # BLD AUTO: 1.13 THOUSANDS/ÂΜL (ref 0.6–4.47)
LYMPHOCYTES NFR BLD AUTO: 13 % (ref 14–44)
MCH RBC QN AUTO: 29.1 PG (ref 26.8–34.3)
MCHC RBC AUTO-ENTMCNC: 33.3 G/DL (ref 31.4–37.4)
MCV RBC AUTO: 87 FL (ref 82–98)
MONOCYTES # BLD AUTO: 0.68 THOUSAND/ÂΜL (ref 0.17–1.22)
MONOCYTES NFR BLD AUTO: 8 % (ref 4–12)
NEUTROPHILS # BLD AUTO: 6.12 THOUSANDS/ÂΜL (ref 1.85–7.62)
NEUTS SEG NFR BLD AUTO: 72 % (ref 43–75)
NRBC BLD AUTO-RTO: 0 /100 WBCS
P AXIS: 77 DEGREES
PLATELET # BLD AUTO: 236 THOUSANDS/UL (ref 149–390)
PMV BLD AUTO: 8.9 FL (ref 8.9–12.7)
POTASSIUM SERPL-SCNC: 3.7 MMOL/L (ref 3.5–5.3)
PR INTERVAL: 168 MS
PROT SERPL-MCNC: 6.8 G/DL (ref 6.4–8.4)
QRS AXIS: 80 DEGREES
QRSD INTERVAL: 82 MS
QT INTERVAL: 346 MS
QTC INTERVAL: 404 MS
RBC # BLD AUTO: 4.92 MILLION/UL (ref 3.88–5.62)
SODIUM SERPL-SCNC: 137 MMOL/L (ref 135–147)
T WAVE AXIS: 59 DEGREES
VENTRICULAR RATE: 82 BPM
WBC # BLD AUTO: 8.52 THOUSAND/UL (ref 4.31–10.16)

## 2023-12-27 PROCEDURE — 85025 COMPLETE CBC W/AUTO DIFF WBC: CPT

## 2023-12-27 PROCEDURE — 99285 EMERGENCY DEPT VISIT HI MDM: CPT | Performed by: EMERGENCY MEDICINE

## 2023-12-27 PROCEDURE — 84484 ASSAY OF TROPONIN QUANT: CPT

## 2023-12-27 PROCEDURE — 99283 EMERGENCY DEPT VISIT LOW MDM: CPT

## 2023-12-27 PROCEDURE — 36415 COLL VENOUS BLD VENIPUNCTURE: CPT

## 2023-12-27 PROCEDURE — 93005 ELECTROCARDIOGRAM TRACING: CPT

## 2023-12-27 PROCEDURE — 80053 COMPREHEN METABOLIC PANEL: CPT

## 2023-12-27 RX ORDER — DOXAZOSIN 2 MG/1
2 TABLET ORAL DAILY
Qty: 30 TABLET | Refills: 5 | Status: SHIPPED | OUTPATIENT
Start: 2023-12-27 | End: 2024-01-05 | Stop reason: ALTCHOICE

## 2023-12-27 NOTE — ED PROVIDER NOTES
History  Chief Complaint   Patient presents with    Hypertension     Reports hx of hypertension at home systolic was in the 200's PTA. Pt was told by his PCP to stop his valsartan d/t having a reaction.      This is a 43-year-old male who presents to the emergency department complaining of high blood pressure.  The patient states that he was at home when his blood pressure was taken with his 200/100.  He states this caused him concern.  He states that he was feeling lightheaded and slightly nauseous.  He denied chest pain or trouble breathing.  He denied fevers or chills.  He denied weakness on one side of his body or the other.  He has a mild headache which is similar to headaches he has had in the past.        Prior to Admission Medications   Prescriptions Last Dose Informant Patient Reported? Taking?   AMILoride 5 mg tablet   No No   Sig: Take 2 tablets (10 mg total) by mouth 2 (two) times a day PATIENT TAKES ONE TABLET BID   Patient taking differently: Take 10 mg by mouth in the morning PATIENT TAKES ONE TABLET BID   ASHWAGANDHA PO  Self Yes No   Sig: Take by mouth daily   Potassium Chloride ER 20 MEQ TBCR   No No   Sig: TAKE 1 TABLET BY MOUTH 2 TIMES A DAY.   amLODIPine (NORVASC) 5 mg tablet   No No   Sig: Take 1 tablet (5 mg total) by mouth daily   carvedilol (COREG) 6.25 mg tablet   Yes No   Sig: Take 6.25 mg by mouth 2 (two) times a day   doxazosin (CARDURA) 2 mg tablet   No No   Sig: Take 1 tablet (2 mg total) by mouth in the morning      Facility-Administered Medications: None       Past Medical History:   Diagnosis Date    Anxiety     Hypertension     Liddle's syndrome     diagnosed 2 years ago       Past Surgical History:   Procedure Laterality Date    CARDIAC CATHETERIZATION  05/31/2018    [place] OPEN TREATMENT ULNAR FRACTURE PROXIMAL END Left 8/24/2022    Procedure: OPEN REDUCTION W/ INTERNAL FIXATION (ORIF) ELBOW- left olecranon and all associated reconstructive procedure;  Surgeon: Farnaz Lora;   Location:  MAIN OR;  Service: Orthopedics       Family History   Problem Relation Age of Onset    Hypertension Mother     Hypertension Maternal Grandmother     Diabetes Neg Hx     Coronary artery disease Neg Hx      I have reviewed and agree with the history as documented.    E-Cigarette/Vaping    E-Cigarette Use Never User      E-Cigarette/Vaping Substances    Nicotine No     THC No     CBD No     Flavoring No     Other No     Unknown No      Social History     Tobacco Use    Smoking status: Never     Passive exposure: Past    Smokeless tobacco: Never   Vaping Use    Vaping status: Never Used   Substance Use Topics    Alcohol use: Never     Comment: none per minesh    Drug use: Yes     Types: Marijuana     Comment: every two three weeks       Review of Systems   All other systems reviewed and are negative.      Physical Exam  Physical Exam  Constitutional:  Vital signs reviewed, patient appears nontoxic, no acute distress  Eyes: Pupils equal round reactive to light and accommodation, extraocular muscles intact  HEENT: trachea midline, no JVD, moist mucous membranes  Respiratory: lung sounds clear throughout, no rhonchi, no rales  Cardiovascular: regular rate rhythm, no murmurs or rubs  Abdomen: soft, nontender, nondistended, no rebound or guarding  Back: no CVA tenderness, normal inspection  Extremities: no edema, pulses equal in all 4 extremities  Neuro: awake, alert, oriented, no focal weakness  Skin: warm, dry, intact, no rashes noted    Vital Signs  ED Triage Vitals [12/27/23 1308]   Temperature Pulse Respirations Blood Pressure SpO2   97.6 °F (36.4 °C) 88 16 (!) 185/133 100 %      Temp Source Heart Rate Source Patient Position - Orthostatic VS BP Location FiO2 (%)   Oral -- Lying Right arm --      Pain Score       1           Vitals:    12/27/23 1515 12/27/23 1530 12/27/23 1545 12/27/23 1600   BP:    160/98   Pulse: 78 81 80 83   Patient Position - Orthostatic VS:             Visual Acuity      ED  Medications  Medications - No data to display    Diagnostic Studies  Results Reviewed       Procedure Component Value Units Date/Time    HS Troponin I 2hr [[id number]]  (Normal) Collected: 12/27/23 1526    Lab Status: Final result Specimen: Blood from Arm, Left Updated: 12/27/23 1553     hs TnI 2hr 3 ng/L      Delta 2hr hsTnI -2 ng/L     HS Troponin 0hr (reflex protocol) [[id number]]  (Normal) Collected: 12/27/23 1340    Lab Status: Final result Specimen: Blood from Arm, Left Updated: 12/27/23 1416     hs TnI 0hr 5 ng/L     Comprehensive metabolic panel [[id number]] Collected: 12/27/23 1340    Lab Status: Final result Specimen: Blood from Arm, Left Updated: 12/27/23 1407     Sodium 137 mmol/L      Potassium 3.7 mmol/L      Chloride 102 mmol/L      CO2 30 mmol/L      ANION GAP 5 mmol/L      BUN 15 mg/dL      Creatinine 1.22 mg/dL      Glucose 112 mg/dL      Calcium 9.6 mg/dL      AST 13 U/L      ALT 13 U/L      Alkaline Phosphatase 62 U/L      Total Protein 6.8 g/dL      Albumin 4.4 g/dL      Total Bilirubin 0.67 mg/dL      eGFR 71 ml/min/1.73sq m     Narrative:      National Kidney Disease Foundation guidelines for Chronic Kidney Disease (CKD):     Stage 1 with normal or high GFR (GFR > 90 mL/min/1.73 square meters)    Stage 2 Mild CKD (GFR = 60-89 mL/min/1.73 square meters)    Stage 3A Moderate CKD (GFR = 45-59 mL/min/1.73 square meters)    Stage 3B Moderate CKD (GFR = 30-44 mL/min/1.73 square meters)    Stage 4 Severe CKD (GFR = 15-29 mL/min/1.73 square meters)    Stage 5 End Stage CKD (GFR <15 mL/min/1.73 square meters)  Note: GFR calculation is accurate only with a steady state creatinine    CBC and differential [[id number]]  (Abnormal) Collected: 12/27/23 1340    Lab Status: Final result Specimen: Blood from Arm, Left Updated: 12/27/23 1351     WBC 8.52 Thousand/uL      RBC 4.92 Million/uL      Hemoglobin 14.3 g/dL      Hematocrit 43.0 %      MCV 87 fL      MCH 29.1 pg      MCHC 33.3 g/dL      RDW 12.7 %      MPV  8.9 fL      Platelets 236 Thousands/uL      nRBC 0 /100 WBCs      Neutrophils Relative 72 %      Immat GRANS % 0 %      Lymphocytes Relative 13 %      Monocytes Relative 8 %      Eosinophils Relative 6 %      Basophils Relative 1 %      Neutrophils Absolute 6.12 Thousands/µL      Immature Grans Absolute 0.02 Thousand/uL      Lymphocytes Absolute 1.13 Thousands/µL      Monocytes Absolute 0.68 Thousand/µL      Eosinophils Absolute 0.51 Thousand/µL      Basophils Absolute 0.06 Thousands/µL                    No orders to display              Procedures  ECG 12 Lead Documentation Only    Date/Time: 12/27/2023 4:30 PM    Performed by: Tra Palacios DO  Authorized by: Tra Palacios DO    ECG reviewed by me, the ED Provider: yes    Patient location:  ED  Comments:      Normal sinus rhythm, rate of 82, normal [place], normal QTc, diffuse ST-T segment elevation consistent with early repolarization, no significant change compared to December 17, 2023, EKG independently interpreted by me           ED Course  ED Course as of 12/28/23 0729   Wed Dec 27, 2023   1454 The patient had initial lab work that was significant for troponin of 5.  The patient denies chest pain at this point.  Will continue to monitor the patient's blood pressure.  His last blood pressure was 173/108.  At this point the patient was taken off of his blood pressure medication due to syncope.  His primary care physician just did order doxazosin for his blood pressure.  I do not feel like adding an additional blood pressure medication at this point would be beneficial.  Will wait for a 2-hour troponin.   1726 The patient had a second troponin of 3 with a delta of -2.  He had no chest pain and nonischemic EKG.  The patient had a repeat blood pressure of 160/98.  I discussed with the patient has chronic hypertension and the importance of controlling his chronic hypertension.  His primary care physician has prescribed him an additional medication today which she  will start.  The patient will be discharged with follow-up to his primary care doctor.                               SBIRT 20yo+      Flowsheet Row Most Recent Value   Initial Alcohol Screen: US AUDIT-C     1. How often do you have a drink containing alcohol? 0 Filed at: 12/27/2023 1308   2. How many drinks containing alcohol do you have on a typical day you are drinking?  0 Filed at: 12/27/2023 1308   3a. Male UNDER 65: How often do you have five or more drinks on one occasion? 0 Filed at: 12/27/2023 1308   Audit-C Score 0 Filed at: 12/27/2023 1308   JUANCARLOS: How many times in the past year have you...    Used an illegal drug or used a prescription medication for non-medical reasons? Never Filed at: 12/27/2023 1308                      Medical Decision Making  Is a 43-year-old male who presents to the emergency department complaining of high blood pressure.  I considered chronic hypertension, endorgan dysfunction as a result of hypertensive emergency. These and other diagnoses were considered.                  Disposition  Final diagnoses:   Uncontrolled hypertension   Chronic hypertension     Time reflects when diagnosis was documented in both MDM as applicable and the Disposition within this note       Time User Action Codes Description Comment    12/27/2023  4:03 PM Tra Palacios Add [I10] Uncontrolled hypertension     12/27/2023  4:03 PM Tra Palacios Add [I10] Chronic hypertension           ED Disposition       ED Disposition   Discharge    Condition   Stable    Date/Time   Wed Dec 27, 2023 1603    Comment   Mike Acevedo discharge to home/self care.                   Follow-up Information       Follow up With Specialties Details Why Contact Info Additional Information    Lizeth Croft MD Internal Medicine In 2 days  [address]   [address]  [facility] [id number] [phone]       [facility] Emergency Department Emergency Medicine  If symptoms worsen [address] [place] [address]  [place] [id number]  [phone] [facility], [address] [place] [id number]            Discharge Medication List as of 12/27/2023  4:08 PM        CONTINUE these medications which have NOT CHANGED    Details   AMILoride 5 mg tablet Take 2 tablets (10 mg total) by mouth 2 (two) times a day PATIENT TAKES ONE TABLET BID, Starting Wed 8/16/2023, Until Sun 12/17/2023, Normal      amLODIPine (NORVASC) 5 mg tablet Take 1 tablet (5 mg total) by mouth daily, Starting Wed 11/1/2023, Normal      ASHWAGANDHA PO Take by mouth daily, Historical Med      carvedilol (COREG) 6.25 mg tablet Take 6.25 mg by mouth 2 (two) times a day, Historical Med      doxazosin (CARDURA) 2 mg tablet Take 1 tablet (2 mg total) by mouth in the morning, Starting Wed 12/27/2023, Normal      Potassium Chloride ER 20 MEQ TBCR TAKE 1 TABLET BY MOUTH 2 TIMES A DAY., Starting Tue 11/14/2023, Normal             No discharge procedures on file.    PDMP Review         Value Time User    PDMP Reviewed  Yes 8/16/2023  4:29 PM Lizeth Croft MD            ED Provider  Electronically Signed by             Tra Palacios DO  12/28/23 0729

## 2023-12-27 NOTE — TELEPHONE ENCOUNTER
Pt's wife called to to let Dr. Muniz know that they went to ER. She would appreciate a call from Dr. Muniz at his earliest convenience.

## 2023-12-27 NOTE — DISCHARGE INSTRUCTIONS
Please take your blood pressure medications as prescribed by your PCP. Please continue to monitor your blood pressure and follow up with your PCP regarding the readings.

## 2023-12-27 NOTE — TELEPHONE ENCOUNTER
Patient's wife, Maira, called to let Dr. Muniz know his blood pressures are still high.  He's been running in the 180's/100's.  He is having nausea, dizziness and headache/pressure.  He has been taking the carvedilol 6.25 twice daily as well as the amiloride 5mg twice daily.

## 2024-01-02 ENCOUNTER — OFFICE VISIT (OUTPATIENT)
Dept: FAMILY MEDICINE CLINIC | Facility: CLINIC | Age: 44
End: 2024-01-02
Payer: COMMERCIAL

## 2024-01-02 ENCOUNTER — TELEPHONE (OUTPATIENT)
Dept: NEPHROLOGY | Facility: CLINIC | Age: 44
End: 2024-01-02

## 2024-01-02 VITALS
BODY MASS INDEX: 24.22 KG/M2 | TEMPERATURE: 98 F | RESPIRATION RATE: 18 BRPM | DIASTOLIC BLOOD PRESSURE: 100 MMHG | HEART RATE: 97 BPM | OXYGEN SATURATION: 98 % | WEIGHT: 154.3 LBS | HEIGHT: 67 IN | SYSTOLIC BLOOD PRESSURE: 190 MMHG

## 2024-01-02 DIAGNOSIS — I15.8 OTHER SECONDARY HYPERTENSION: Primary | ICD-10-CM

## 2024-01-02 DIAGNOSIS — I10 ACCELERATED HYPERTENSION: ICD-10-CM

## 2024-01-02 PROCEDURE — 99213 OFFICE O/P EST LOW 20 MIN: CPT | Performed by: INTERNAL MEDICINE

## 2024-01-02 RX ORDER — CARVEDILOL 25 MG/1
25 TABLET ORAL 2 TIMES DAILY
Qty: 60 TABLET | Refills: 1 | Status: SHIPPED | OUTPATIENT
Start: 2024-01-02

## 2024-01-02 NOTE — PROGRESS NOTES
Assessment/Plan:       Problem List Items Addressed This Visit       Other secondary hypertension - Primary     Blood pressure is uncontrolled  He has not been compliant with prior medications due to various side effects  I encouraged him to continue the Cardura and just give it a little bit more time to see if he can tolerate it  He will speak with his nephrologist about his blood pressure medication  I increased his carvedilol to 25 mg twice daily from 12.5 mg twice daily-will also discuss this with nephrology to make sure they are okay with this  Follow-up in February as scheduled though continue to monitor blood pressure at home and follow-up with nephrology         Relevant Medications    carvedilol (COREG) 25 mg tablet         Subjective:     Chief Complaint   Patient presents with    Hypertension     Patient stated was a bit dizzy this morning. Patient stated was in ED 12/27 for high BP           Patient ID: Mike Acevedo is a 43 y.o. male who presents with high blood pressure.  He has been to the ER multiple times since the middle of December due to similar complaints.  He has had dizziness and occasional lightheadedness associated with his symptoms.  States that he was on valsartan which was started by his nephrologist but then his blood pressure went too low.  He went to the ER and his blood pressure was very high at that time.  He ended up stopping the valsartan after speaking with his nephrologist.  They started him on Cardura which he only took once but states that he did not feel good on it so he stopped it.  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  He is not sure what to do about the blood pressure but is calling his nephrologist today to see if further adjustments need to be made.  He states that he takes his medications as prescribed unless he has issues with them.  He has been tolerating the amiloride and carvedilol without any side  "effects.        Patient's past medical history, surgical history, family history, medications, allergies and social history reviewed and updated    Review of Systems   Constitutional:  Negative for chills and fever.   HENT:  Negative for congestion and sore throat.    Respiratory:  Negative for cough and shortness of breath.    Cardiovascular:  Negative for leg swelling.   Gastrointestinal:  Negative for abdominal pain, blood in stool and diarrhea.   Genitourinary:  Negative for dysuria and frequency.   Neurological:  Positive for light-headedness and headaches.         All other ROS negative.     Objective:    Vitals:    01/02/24 0845   BP: (!) 190/100   Pulse: 97   Resp: 18   Temp: 98 °F (36.7 °C)   SpO2: 98%          Physical Exam  Vitals reviewed.   Constitutional:       General: He is not in acute distress.  HENT:      Right Ear: External ear normal.      Left Ear: External ear normal.      Nose: Nose normal.      Mouth/Throat:      Mouth: Mucous membranes are moist.   Eyes:      Conjunctiva/sclera: Conjunctivae normal.   Cardiovascular:      Rate and Rhythm: Normal rate and regular rhythm.      Heart sounds: No murmur heard.  Pulmonary:      Effort: Pulmonary effort is normal. No respiratory distress.      Breath sounds: No wheezing.   Abdominal:      General: There is no distension.      Palpations: Abdomen is soft.      Tenderness: There is no abdominal tenderness.   Musculoskeletal:      Right lower leg: No edema.      Left lower leg: No edema.   Lymphadenopathy:      Cervical: No cervical adenopathy.   Neurological:      Mental Status: He is alert and oriented to person, place, and time.   Psychiatric:         Mood and Affect: Mood normal.               Portions of the record may have been created with voice recognition software.  Occasional wrong word or \"sound a like\" substitutions may have occurred due to the inherent limitations of voice recognition software.  Read the chart carefully and recognize, " using context, where substitutions have occurred.     Lizeth Croft MD  Internal Medicine and Pediatrics

## 2024-01-02 NOTE — ASSESSMENT & PLAN NOTE
Blood pressure is uncontrolled  He has not been compliant with prior medications due to various side effects  I encouraged him to continue the Cardura and just give it a little bit more time to see if he can tolerate it  He will speak with his nephrologist about his blood pressure medication  I increased his carvedilol to 25 mg twice daily from 12.5 mg twice daily-will also discuss this with nephrology to make sure they are okay with this  Follow-up in February as scheduled though continue to monitor blood pressure at home and follow-up with nephrology

## 2024-01-02 NOTE — TELEPHONE ENCOUNTER
Pts wife Maira came in to drop off paperwork for Dr Muniz to fill out for his disability. Let Dr Muniz know and he stated it should be done through his primary care. Let Maira know and she stated she will come by to  the paperwork from us before EOD.

## 2024-01-03 DIAGNOSIS — I10 PRIMARY HYPERTENSION: ICD-10-CM

## 2024-01-03 RX ORDER — AMILORIDE HYDROCHLORIDE 5 MG/1
TABLET ORAL
Qty: 360 TABLET | Refills: 0 | Status: SHIPPED | OUTPATIENT
Start: 2024-01-03

## 2024-01-03 NOTE — TELEPHONE ENCOUNTER
Maira, patient spouse called to verify that paperwork she dropped off yesterday was recd by Dr Croft. I advised her that it was recd.  After call ended, I noticed that paperwork was dropped off off for Dr Muniz and not Guerda; called patient and left message advising her that paperwork was not dropped off with pcp office.

## 2024-01-04 RX ORDER — SODIUM CHLORIDE, SODIUM LACTATE, POTASSIUM CHLORIDE, CALCIUM CHLORIDE 600; 310; 30; 20 MG/100ML; MG/100ML; MG/100ML; MG/100ML
125 INJECTION, SOLUTION INTRAVENOUS CONTINUOUS
Status: CANCELLED | OUTPATIENT
Start: 2024-01-04

## 2024-01-04 NOTE — TELEPHONE ENCOUNTER
Pt called today to verify we recv the ppwk because someone said we had it and somene said we had not, she dropped it off Tuesday night. Called over to office and they verified we did have the paperwork but  was not in yesterday but will be in today to fill it out and will call her later when its completed.

## 2024-01-05 ENCOUNTER — ANESTHESIA EVENT (OUTPATIENT)
Dept: GASTROENTEROLOGY | Facility: AMBULATORY SURGERY CENTER | Age: 44
End: 2024-01-05

## 2024-01-05 ENCOUNTER — ANESTHESIA (OUTPATIENT)
Dept: GASTROENTEROLOGY | Facility: AMBULATORY SURGERY CENTER | Age: 44
End: 2024-01-05

## 2024-01-05 ENCOUNTER — HOSPITAL ENCOUNTER (OUTPATIENT)
Dept: GASTROENTEROLOGY | Facility: AMBULATORY SURGERY CENTER | Age: 44
Discharge: HOME | End: 2024-01-05
Payer: COMMERCIAL

## 2024-01-05 VITALS
WEIGHT: 150 LBS | SYSTOLIC BLOOD PRESSURE: 178 MMHG | HEIGHT: 69 IN | BODY MASS INDEX: 22.22 KG/M2 | TEMPERATURE: 99.1 F | HEART RATE: 81 BPM | RESPIRATION RATE: 18 BRPM | OXYGEN SATURATION: 100 % | DIASTOLIC BLOOD PRESSURE: 119 MMHG

## 2024-01-05 DIAGNOSIS — Z12.11 SCREENING FOR COLON CANCER: ICD-10-CM

## 2024-01-05 DIAGNOSIS — E87.6 HYPOKALEMIA: ICD-10-CM

## 2024-01-05 PROBLEM — F41.9 ANXIETY: Status: ACTIVE | Noted: 2024-01-05

## 2024-01-05 PROCEDURE — 88305 TISSUE EXAM BY PATHOLOGIST: CPT | Performed by: PATHOLOGY

## 2024-01-05 PROCEDURE — 45385 COLONOSCOPY W/LESION REMOVAL: CPT | Performed by: COLON & RECTAL SURGERY

## 2024-01-05 RX ORDER — SODIUM CHLORIDE 9 MG/ML
50 INJECTION, SOLUTION INTRAVENOUS CONTINUOUS
Status: CANCELLED | OUTPATIENT
Start: 2024-01-05

## 2024-01-05 RX ORDER — SODIUM CHLORIDE, SODIUM LACTATE, POTASSIUM CHLORIDE, CALCIUM CHLORIDE 600; 310; 30; 20 MG/100ML; MG/100ML; MG/100ML; MG/100ML
125 INJECTION, SOLUTION INTRAVENOUS CONTINUOUS
Status: DISCONTINUED | OUTPATIENT
Start: 2024-01-05 | End: 2024-01-05

## 2024-01-05 RX ORDER — POTASSIUM CHLORIDE 1500 MG/1
1 TABLET, EXTENDED RELEASE ORAL 2 TIMES DAILY
Qty: 180 TABLET | Refills: 1 | Status: SHIPPED | OUTPATIENT
Start: 2024-01-05

## 2024-01-05 RX ORDER — PROPOFOL 10 MG/ML
INJECTION, EMULSION INTRAVENOUS AS NEEDED
Status: DISCONTINUED | OUTPATIENT
Start: 2024-01-05 | End: 2024-01-05

## 2024-01-05 RX ADMIN — PROPOFOL 50 MG: 10 INJECTION, EMULSION INTRAVENOUS at 09:16

## 2024-01-05 RX ADMIN — PROPOFOL 100 MG: 10 INJECTION, EMULSION INTRAVENOUS at 09:07

## 2024-01-05 RX ADMIN — SODIUM CHLORIDE, SODIUM LACTATE, POTASSIUM CHLORIDE, CALCIUM CHLORIDE: 600; 310; 30; 20 INJECTION, SOLUTION INTRAVENOUS at 08:57

## 2024-01-05 RX ADMIN — PROPOFOL 50 MG: 10 INJECTION, EMULSION INTRAVENOUS at 09:12

## 2024-01-05 RX ADMIN — PROPOFOL 50 MG: 10 INJECTION, EMULSION INTRAVENOUS at 09:22

## 2024-01-05 RX ADMIN — PROPOFOL 150 MG: 10 INJECTION, EMULSION INTRAVENOUS at 09:02

## 2024-01-05 NOTE — H&P
History and Physical   Colon and Rectal Surgery   Mike Acevedo 43 y.o. male MRN: [id number]  Unit/Bed#:  Encounter: [id number]  01/05/24   8:55 AM      CC:  Screening    History of Present Illness   HPI:  Mike Acevedo is a 43 y.o. male with no GI symptoms.  Historical Information   Past Medical History:   Diagnosis Date    Anxiety     Hypertension     Liddle's syndrome     diagnosed 2 years ago    Palpitations     occasional when BP is elevated  1/5/24     Past Surgical History:   Procedure Laterality Date    CARDIAC CATHETERIZATION  05/31/2018    [place] OPEN TREATMENT ULNAR FRACTURE PROXIMAL END Left 8/24/2022    Procedure: OPEN REDUCTION W/ INTERNAL FIXATION (ORIF) ELBOW- left olecranon and all associated reconstructive procedure;  Surgeon: Farnaz Lora;  Location:  MAIN OR;  Service: Orthopedics       Meds/Allergies     (Not in a hospital admission)        Current Outpatient Medications:     AMILoride 5 mg tablet, TAKE 2 TABLETS BY MOUTH 2 TIMES A DAY OR AS DIRECTED BY MD, Disp: 360 tablet, Rfl: 0    carvedilol (COREG) 25 mg tablet, Take 1 tablet (25 mg total) by mouth 2 (two) times a day, Disp: 60 tablet, Rfl: 1    Potassium Chloride ER 20 MEQ TBCR, TAKE 1 TABLET BY MOUTH 2 TIMES A DAY., Disp: 180 tablet, Rfl: 0    Current Facility-Administered Medications:     lactated ringers infusion, 125 mL/hr, Intravenous, Continuous, Charly Merrill, DO    No Known Allergies      Social History   Social History     Substance and Sexual Activity   Alcohol Use Not Currently    Comment: none per minesh     Social History     Substance and Sexual Activity   Drug Use Yes    Types: Marijuana    Comment: every two three weeks; last used 1/3/24     Social History     Tobacco Use   Smoking Status Never    Passive exposure: Past   Smokeless Tobacco Never         Family History:   Family History   Problem Relation Age of Onset    Hypertension Mother     Hypertension Maternal Grandmother     Diabetes Neg Hx     Coronary artery  "disease Neg Hx          Objective     Current Vitals:   Blood Pressure: (!) 180/126 (Dr. Merrill made aware.) (01/05/24 0817)  Pulse: 88 (01/05/24 0817)  Temperature: 99.1 °F (37.3 °C) (01/05/24 0817)  Temp Source: Temporal (01/05/24 0817)  Respirations: 16 (01/05/24 0817)  Height: 5' 9\" (175.3 cm) (01/05/24 0817)  Weight - Scale: 68 kg (150 lb) (01/05/24 0817)  SpO2: 100 % (01/05/24 0817)  No intake or output data in the 24 hours ending 01/05/24 0855    Physical Exam:  General:  Well nourished, no distress.  Neuro: Alert and oriented  Eyes:Sclera anicteric, conjunctiva pink.  Pulm: Clear to auscultation bilaterally. No respiratory Distress.   CV:  Regular rate and rhythm. No murmurs.  Abdomen:  Soft, flat, non-tender, without masses or hepatosplenomegaly.    Lab Results:       ASSESSMENT:  Mike Acevedo is a 43 y.o. male for screening.  PLAN:  Colonoscopy.  Risks , including, but not limited to, bleeding, perforation, missed lesions, and potential need for surgery, were reviewed. Alternatives to colonoscopy were discussed.  JOHANNA España MD  "

## 2024-01-05 NOTE — ANESTHESIA POSTPROCEDURE EVALUATION
Post-Op Assessment Note    CV Status:  Stable  Pain Score: 0    Pain management: adequate       Mental Status:  Alert and awake   Hydration Status:  Euvolemic   PONV Controlled:  Controlled   Airway Patency:  Patent     Post Op Vitals Reviewed: Yes      Staff: CRNA               BP   151/84   Temp   98.0   Pulse  89   Resp   18   SpO2   100

## 2024-01-05 NOTE — ANESTHESIA PREPROCEDURE EVALUATION
Procedure:  COLONOSCOPY    Relevant Problems   ANESTHESIA (within normal limits)      CARDIO   (+) Other secondary hypertension   (+) Primary hypertension (Resolved)      NEURO/PSYCH   (+) Anxiety      Endocrine   (+) Liddle's syndrome      Other   (+) Hypokalemia        Physical Exam    Airway    Mallampati score: II  TM Distance: >3 FB  Neck ROM: full     Dental   No notable dental hx     Cardiovascular      Pulmonary      Other Findings        Anesthesia Plan  ASA Score- 2     Anesthesia Type- IV sedation with anesthesia with ASA Monitors.         Additional Monitors:     Airway Plan:            Plan Factors-Exercise tolerance (METS): >4 METS.    Chart reviewed. EKG reviewed.  Existing labs reviewed. Patient summary reviewed.    Patient is not a current smoker.              Induction- intravenous.    Postoperative Plan-     Informed Consent- Anesthetic plan and risks discussed with patient.  I personally reviewed this patient with the CRNA. Discussed and agreed on the Anesthesia Plan with the CRNA..

## 2024-01-09 ENCOUNTER — TELEPHONE (OUTPATIENT)
Dept: FAMILY MEDICINE CLINIC | Facility: CLINIC | Age: 44
End: 2024-01-09

## 2024-01-09 PROCEDURE — 88305 TISSUE EXAM BY PATHOLOGIST: CPT | Performed by: PATHOLOGY

## 2024-01-12 ENCOUNTER — TELEPHONE (OUTPATIENT)
Dept: NEPHROLOGY | Facility: CLINIC | Age: 44
End: 2024-01-12

## 2024-01-12 NOTE — TELEPHONE ENCOUNTER
Called patient and left a voicemail asking patient to please call the office to go over the medications.

## 2024-01-12 NOTE — TELEPHONE ENCOUNTER
----- Message from Best Muniz MD sent at 1/12/2024  7:42 AM EST -----  Please call and ask what meds he is actually taking.  Looks like only carvedilol and amiloride.  I saw BP was up at colonoscopy.  If he is only on those 2 and BP up I want to send in a med called nifedipine to see if will help. Let me know that they are OK with that. Thanks.

## 2024-01-14 NOTE — TELEPHONE ENCOUNTER
Patient's wife called to reschedule missed appt  Informed her we currently have no openings for Dr Hieu Mari or JUS in [facility] or [facility]   Added patient to recall and waitlist
stated

## 2024-01-25 DIAGNOSIS — I15.8 OTHER SECONDARY HYPERTENSION: ICD-10-CM

## 2024-01-25 RX ORDER — CARVEDILOL 25 MG/1
25 TABLET ORAL 2 TIMES DAILY
Qty: 180 TABLET | Refills: 1 | Status: SHIPPED | OUTPATIENT
Start: 2024-01-25

## 2024-02-02 ENCOUNTER — TELEPHONE (OUTPATIENT)
Dept: NEPHROLOGY | Facility: CLINIC | Age: 44
End: 2024-02-02

## 2024-02-02 NOTE — TELEPHONE ENCOUNTER
Called patient and left a voicemail stating the following information:     Please call the office back and let up know how your BP is doing.    I asked the patient please call back with further questions.

## 2024-02-02 NOTE — TELEPHONE ENCOUNTER
----- Message from Best Muniz MD sent at 2/2/2024  8:43 AM EST -----  Please calland ask how BP is doing?

## 2024-02-07 ENCOUNTER — OFFICE VISIT (OUTPATIENT)
Dept: FAMILY MEDICINE CLINIC | Facility: CLINIC | Age: 44
End: 2024-02-07

## 2024-02-07 VITALS
TEMPERATURE: 98 F | HEIGHT: 69 IN | RESPIRATION RATE: 18 BRPM | BODY MASS INDEX: 24.38 KG/M2 | HEART RATE: 86 BPM | DIASTOLIC BLOOD PRESSURE: 110 MMHG | SYSTOLIC BLOOD PRESSURE: 140 MMHG | OXYGEN SATURATION: 98 % | WEIGHT: 164.6 LBS

## 2024-02-07 DIAGNOSIS — F41.9 ANXIETY: ICD-10-CM

## 2024-02-07 DIAGNOSIS — I15.1 LIDDLE'S SYNDROME: ICD-10-CM

## 2024-02-07 DIAGNOSIS — I15.8 OTHER SECONDARY HYPERTENSION: Primary | ICD-10-CM

## 2024-02-07 PROCEDURE — 99213 OFFICE O/P EST LOW 20 MIN: CPT | Performed by: INTERNAL MEDICINE

## 2024-02-07 NOTE — PROGRESS NOTES
Assessment/Plan:       Problem List Items Addressed This Visit       Liddle's syndrome     Following with nephrology  Continue amiloride         Other secondary hypertension - Primary     Blood pressure is better today than I have seen in a while  It is still not normal  Per patient however blood pressure seems to be running better when he is calm at home  We can keep the same medications for now but encouraged to follow-up with his nephrologist to see if further changes need to be made  He has not tolerated numerous blood pressure medications due to various minor side effects which leads to patient stopping them         Anxiety     Could be contributing to his elevated blood pressure it seems but hard to say for sure  Advised to try and monitor blood pressure when he is calm at home at rest              Subjective:     Chief Complaint   Patient presents with    Follow-up     3 month follow up           Patient ID: Mike Acevedo is a 43 y.o. male who comes in for his regular follow-up.  He has been taking the carvedilol 25 mg twice a day and has tolerated it fine without any issues.  He is very hesitant to start any other medications because of all the side effects he has had.  States that some blood pressure medications has made his blood pressure so low that he feels very lightheaded.  He notes that when he is anxious and worked up his blood pressure tends to be much higher.  When he wakes up his blood pressure is always high but after he takes a warm shower and takes his medications his blood pressure gets to a normal range.  States that systolic is often 130 or so.  When he is stressed about something and checks his blood pressure it tends to be high.  He has not had a chance to follow-up with nephrology yet but will call and schedule follow-up as soon as possible.        Patient's past medical history, surgical history, family history, medications, allergies and social history reviewed and updated    Review of  "Systems   Constitutional:  Negative for chills and fever.   HENT:  Negative for congestion and sore throat.    Respiratory:  Negative for cough and shortness of breath.    Cardiovascular:  Negative for chest pain and leg swelling.   Gastrointestinal:  Negative for abdominal pain, blood in stool and diarrhea.   Genitourinary:  Negative for dysuria and frequency.   Neurological:  Negative for headaches.         All other ROS negative.     Objective:    Vitals:    02/07/24 1701   BP: (!) 140/110   Pulse: 86   Resp: 18   Temp: 98 °F (36.7 °C)   SpO2: 98%          Physical Exam  Vitals reviewed.   Constitutional:       General: He is not in acute distress.  HENT:      Right Ear: External ear normal.      Left Ear: External ear normal.      Nose: Nose normal.      Mouth/Throat:      Mouth: Mucous membranes are moist.   Eyes:      Conjunctiva/sclera: Conjunctivae normal.   Cardiovascular:      Rate and Rhythm: Normal rate and regular rhythm.      Heart sounds: No murmur heard.  Pulmonary:      Effort: Pulmonary effort is normal. No respiratory distress.      Breath sounds: No wheezing.   Abdominal:      General: There is no distension.      Palpations: Abdomen is soft.      Tenderness: There is no abdominal tenderness.   Musculoskeletal:      Right lower leg: No edema.      Left lower leg: No edema.   Lymphadenopathy:      Cervical: No cervical adenopathy.   Neurological:      Mental Status: He is alert and oriented to person, place, and time.   Psychiatric:         Mood and Affect: Mood normal.               Portions of the record may have been created with voice recognition software.  Occasional wrong word or \"sound a like\" substitutions may have occurred due to the inherent limitations of voice recognition software.  Read the chart carefully and recognize, using context, where substitutions have occurred.     Lizeth Croft MD  Internal Medicine and Pediatrics  "

## 2024-02-08 NOTE — ASSESSMENT & PLAN NOTE
Could be contributing to his elevated blood pressure it seems but hard to say for sure  Advised to try and monitor blood pressure when he is calm at home at rest

## 2024-02-08 NOTE — ASSESSMENT & PLAN NOTE
Blood pressure is better today than I have seen in a while  It is still not normal  Per patient however blood pressure seems to be running better when he is calm at home  We can keep the same medications for now but encouraged to follow-up with his nephrologist to see if further changes need to be made  He has not tolerated numerous blood pressure medications due to various minor side effects which leads to patient stopping them

## 2024-03-12 DIAGNOSIS — I15.8 OTHER SECONDARY HYPERTENSION: ICD-10-CM

## 2024-03-12 DIAGNOSIS — E87.6 HYPOKALEMIA: ICD-10-CM

## 2024-03-12 DIAGNOSIS — I10 PRIMARY HYPERTENSION: ICD-10-CM

## 2024-03-12 RX ORDER — AMILORIDE HYDROCHLORIDE 5 MG/1
TABLET ORAL
Qty: 360 TABLET | Refills: 1 | Status: SHIPPED | OUTPATIENT
Start: 2024-03-12

## 2024-03-12 RX ORDER — CARVEDILOL 25 MG/1
25 TABLET ORAL 2 TIMES DAILY
Qty: 180 TABLET | Refills: 1 | Status: SHIPPED | OUTPATIENT
Start: 2024-03-12

## 2024-03-12 RX ORDER — POTASSIUM CHLORIDE 1500 MG/1
1 TABLET, EXTENDED RELEASE ORAL 2 TIMES DAILY
Qty: 180 TABLET | Refills: 1 | Status: SHIPPED | OUTPATIENT
Start: 2024-03-12

## 2024-03-14 ENCOUNTER — TELEPHONE (OUTPATIENT)
Age: 44
End: 2024-03-14

## 2024-03-14 NOTE — TELEPHONE ENCOUNTER
Marylin from the [facility] called she is looking for the status on the attending physician progress report it was faxed to the office on 3/11/24  any questions she can be reached at [phone]   at extension   [phone]         Fax [phone]

## 2024-03-14 NOTE — TELEPHONE ENCOUNTER
Tried to call [facility] back as the paperwork requested was faxed on 1/9/24. Had to leave  a message with the  for [facility] to call the office.  The form sent to us recently was identical to the form faxed to [facility] on 1/9/24,need to know if it was received and /or if new information is needed.

## 2024-03-20 NOTE — TELEPHONE ENCOUNTER
I never received a call from [facility] if they had received the paperwork faxed in Jan. Refaxed paperwork 3/19/24

## 2024-05-15 ENCOUNTER — OFFICE VISIT (OUTPATIENT)
Dept: FAMILY MEDICINE CLINIC | Facility: CLINIC | Age: 44
End: 2024-05-15

## 2024-05-15 VITALS
HEIGHT: 69 IN | DIASTOLIC BLOOD PRESSURE: 120 MMHG | RESPIRATION RATE: 18 BRPM | TEMPERATURE: 98.2 F | OXYGEN SATURATION: 98 % | BODY MASS INDEX: 24.32 KG/M2 | WEIGHT: 164.2 LBS | SYSTOLIC BLOOD PRESSURE: 164 MMHG | HEART RATE: 61 BPM

## 2024-05-15 DIAGNOSIS — R20.2 RIGHT ARM AND RIGHT FACE TINGLING: ICD-10-CM

## 2024-05-15 DIAGNOSIS — I15.1 LIDDLE'S SYNDROME: Primary | ICD-10-CM

## 2024-05-15 DIAGNOSIS — I15.8 OTHER SECONDARY HYPERTENSION: ICD-10-CM

## 2024-05-15 PROBLEM — G50.0 TRIGEMINAL NEURALGIA: Status: RESOLVED | Noted: 2021-10-07 | Resolved: 2024-05-15

## 2024-05-15 PROCEDURE — 99214 OFFICE O/P EST MOD 30 MIN: CPT | Performed by: INTERNAL MEDICINE

## 2024-05-15 RX ORDER — LOSARTAN POTASSIUM 25 MG/1
25 TABLET ORAL DAILY
Qty: 30 TABLET | Refills: 2 | Status: SHIPPED | OUTPATIENT
Start: 2024-05-15

## 2024-05-15 NOTE — PROGRESS NOTES
"Assessment/Plan:       Problem List Items Addressed This Visit       Liddle's syndrome - Primary     Strongly encouraged to follow up with nephrology- has been non compliant with making follow up appt with nephro  Discussed consequences of severe hypertension          Other secondary hypertension     BP remains uncontrolled  I think we need to add medication- pt has always been very hesitant  Start losartan (can monitor K level since also on amilioride though has had issues with hypokalemia in the past)  We can decrease K supplementation but will check labs in 1 week          Relevant Medications    losartan (COZAAR) 25 mg tablet    Other Relevant Orders    Basic metabolic panel    Right arm and right face tingling     Started 2 weeks ago, less often this week but happens for a few seconds 1-2 times a day  Neuro exam unremarkable  Call if symptoms more persistent- could be related to his blood pressure              Subjective:     Chief Complaint   Patient presents with    Follow-up     3 month follow up           Patient ID: Mike Acevedo is a 44 y.o. male who presents for a follow-up for his blood pressure.  He continues to have very high blood pressure readings.  States that at home his blood pressure is mostly \"good\" which means that it is around 140/90 or less.  Every now and then his blood pressure readings are much higher.  Dates that when he wakes up his blood pressure is usually very high.  Systolic is usually 180 and diastolic is usually 110 or higher.  Denies any headaches, chest pain, difficulty breathing.  He continues to be very hesitant to take different medications and has not made a follow-up appointment with his nephrologist.    He also notes as he is leaving the office that he has had tingling in his right arm and face 1-2 times a day.  Only lasts a few seconds.  It was worse last week but less frequently this week.  He denies any weakness or difficulty holding things.  Denies any increased " "stress.        Patient's past medical history, surgical history, family history, medications, allergies and social history reviewed and updated    Review of Systems   Constitutional:  Negative for chills and fever.   HENT:  Negative for congestion and sore throat.    Respiratory:  Negative for cough and shortness of breath.    Cardiovascular:  Negative for chest pain and leg swelling.   Gastrointestinal:  Negative for abdominal pain, blood in stool and diarrhea.   Genitourinary:  Negative for dysuria and frequency.   Neurological:  Negative for headaches.         All other ROS negative.     Objective:    Vitals:    05/15/24 1737   BP: (!) 164/120   Pulse: 61   Resp: 18   Temp: 98.2 °F (36.8 °C)   SpO2: 98%          Physical Exam  Vitals reviewed.   Constitutional:       General: He is not in acute distress.  HENT:      Right Ear: External ear normal.      Left Ear: External ear normal.      Nose: Nose normal.      Mouth/Throat:      Mouth: Mucous membranes are moist.   Eyes:      Conjunctiva/sclera: Conjunctivae normal.   Cardiovascular:      Rate and Rhythm: Normal rate and regular rhythm.      Heart sounds: No murmur heard.  Pulmonary:      Effort: Pulmonary effort is normal. No respiratory distress.      Breath sounds: No wheezing.   Abdominal:      General: There is no distension.      Palpations: Abdomen is soft.      Tenderness: There is no abdominal tenderness.   Musculoskeletal:      Right lower leg: No edema.      Left lower leg: No edema.   Lymphadenopathy:      Cervical: No cervical adenopathy.   Neurological:      General: No focal deficit present.      Mental Status: He is alert and oriented to person, place, and time. Mental status is at baseline.      Sensory: No sensory deficit.      Motor: No weakness.   Psychiatric:         Mood and Affect: Mood normal.               Portions of the record may have been created with voice recognition software.  Occasional wrong word or \"sound a like\" substitutions " may have occurred due to the inherent limitations of voice recognition software.  Read the chart carefully and recognize, using context, where substitutions have occurred.     Lizeth Croft MD  Internal Medicine and Pediatrics

## 2024-05-16 PROBLEM — R20.2 RIGHT ARM AND RIGHT FACE TINGLING: Status: ACTIVE | Noted: 2024-05-16

## 2024-05-16 NOTE — ASSESSMENT & PLAN NOTE
Strongly encouraged to follow up with nephrology- has been non compliant with making follow up appt with nephro  Discussed consequences of severe hypertension

## 2024-05-16 NOTE — ASSESSMENT & PLAN NOTE
BP remains uncontrolled  I think we need to add medication- pt has always been very hesitant  Start losartan (can monitor K level since also on amilioride though has had issues with hypokalemia in the past)  We can decrease K supplementation but will check labs in 1 week

## 2024-05-16 NOTE — ASSESSMENT & PLAN NOTE
Started 2 weeks ago, less often this week but happens for a few seconds 1-2 times a day  Neuro exam unremarkable  Call if symptoms more persistent- could be related to his blood pressure

## 2024-06-12 DIAGNOSIS — I15.8 OTHER SECONDARY HYPERTENSION: ICD-10-CM

## 2024-06-12 RX ORDER — LOSARTAN POTASSIUM 25 MG/1
25 TABLET ORAL DAILY
Qty: 90 TABLET | Refills: 1 | Status: SHIPPED | OUTPATIENT
Start: 2024-06-12

## 2024-08-15 NOTE — PROGRESS NOTES
Daily Note     Today's date: 2022  Patient name: Clair Walters  : 1980  MRN: [id number]  Referring provider: Karime Caal PA-C  Dx:   Encounter Diagnosis     ICD-10-CM    1  Closed fracture of proximal end of left ulna with routine healing, unspecified fracture morphology, subsequent encounter  [id number]        Start Time: 1145  Stop Time: 1227  Total time in clinic (min): 42 minutes    Subjective: Patient reports he is "still stiff but having less pain"  Objective: See treatment diary below      Assessment: Tolerated treatment well and shows good form with all exercises completed today  Patient was further educated on the appropriate intensity at which to perform his exercises  No increases in discomfort present post session  Patient demonstrated fatigue post treatment and would benefit from continued PT  Plan: Continue per plan of care  Precautions:   Past Medical History:   Diagnosis Date    Anxiety     Hypertension     Liddle's syndrome     diagnosed 2 years ago     ORIF Left ulna 22  Per doctors orders no strengthening till 6 weeks post op (10/5)        Manuals         Elbow PROM  TE 13' TE 10' TE 15' focus more on flex/ext today TE 15' flex/ext        STM  TE gentle anterior elbow 5' TE gentle anterior elbow 5'                                    Neuro Re-Ed                                                                                                        Ther Ex             Elbow self PROM patient educated on technique 20x 2" ea  Flex/ext/pron/sup 20x 5" ea  Flex/ext/pron/sup 20x 5" ea  Flex/ext/pron/sup 20x 5" ea  Flex/ext/pron/sup        Elbow AROM  20x 2" ea  Flex/ext/sup/pron 20x 2" ea  Flex/ext/sup/pron 20x 2" ea  Flex/ext/sup/pron 20x 2" ea   Flex/ext/sup/pron        Supine low load elbow extension stretch  2x2' elbow resting on towel roll 2'x2 elbow resting on towel  2'x2 elbow resting on towel  2'x2 elbow resting on towel         Wrist Telemetry Bed?: Yes   Admitting Physician: FAROOQ JULIAN [[id number]]   Is this a telephone or verbal order?: This is a telephone order from the admitting physician   AROM   10x5" ea  Flex/ext Elbow slightly bent 2x10 5" ea  Flex/ext Elbow extended 2x10 5" ea  Flex/ext Elbow extended 10x10" ea  Flex/ext Elbow extended        Table slides for elbow flex/ext ROM  AAROM done over slide board, Forearm neutral 20x ea  AROM done over slide board, Forearm neutral 20x 5" ea  AROM done over slide board, Forearm neutral 20x 5" ea  AROM done over slide board, Forearm neutral 20x 5" ea          Finger ladder      x10                                  Ther Activity                                       Gait Training                                       Modalities

## 2024-09-19 DIAGNOSIS — I15.8 OTHER SECONDARY HYPERTENSION: ICD-10-CM

## 2024-09-19 DIAGNOSIS — I10 PRIMARY HYPERTENSION: ICD-10-CM

## 2024-09-19 RX ORDER — CARVEDILOL 25 MG/1
25 TABLET ORAL 2 TIMES DAILY
Qty: 180 TABLET | Refills: 1 | Status: SHIPPED | OUTPATIENT
Start: 2024-09-19

## 2024-09-19 RX ORDER — AMILORIDE HYDROCHLORIDE 5 MG/1
TABLET ORAL
Qty: 360 TABLET | Refills: 1 | Status: SHIPPED | OUTPATIENT
Start: 2024-09-19

## 2024-10-22 ENCOUNTER — TELEPHONE (OUTPATIENT)
Age: 44
End: 2024-10-22

## 2024-10-22 NOTE — TELEPHONE ENCOUNTER
Left message for patient that Dr Stanley is not returning from maternity leave and to please call [phone] [facility] to get rescheduled with Dr Badillo

## 2024-10-23 DIAGNOSIS — E87.6 HYPOKALEMIA: ICD-10-CM

## 2024-10-24 RX ORDER — POTASSIUM CHLORIDE 1500 MG/1
1 TABLET, EXTENDED RELEASE ORAL 2 TIMES DAILY
Qty: 180 TABLET | Refills: 1 | Status: SHIPPED | OUTPATIENT
Start: 2024-10-24

## 2025-03-12 ENCOUNTER — TELEPHONE (OUTPATIENT)
Age: 45
End: 2025-03-12

## 2025-03-29 NOTE — TELEPHONE ENCOUNTER
03/29/25 12:09 PM        The office's request has been received, reviewed, and the patient chart updated. The PCP has successfully been removed with a patient attribution note. This message will now be completed.        Thank you  Susie Neville

## (undated) DEVICE — MEDI-VAC YANKAUER SUCTION HANDLE W/STRAIGHT TIP & CONTROL VENT: Brand: CARDINAL HEALTH

## (undated) DEVICE — 2.8MM DRILL BIT/QC/165MM

## (undated) DEVICE — 2.7MM VA LCKNG SCREW SLF-TPNG WITH T8 STARDRIVE RECESS 20MM
Type: IMPLANTABLE DEVICE | Site: ELBOW | Status: NON-FUNCTIONAL
Removed: 2022-08-24

## (undated) DEVICE — TUBING SUCTION 5MM X 12 FT

## (undated) DEVICE — CONNECTOR SIMS STRL

## (undated) DEVICE — ARM SLING: Brand: DEROYAL

## (undated) DEVICE — SUT MONOCRYL 2-0 SH 27 IN Y417H

## (undated) DEVICE — MAYO STAND COVER: Brand: CONVERTORS

## (undated) DEVICE — 10FR FRAZIER SUCTION HANDLE: Brand: CARDINAL HEALTH

## (undated) DEVICE — DISPOSABLE EQUIPMENT COVER: Brand: SMALL TOWEL DRAPE

## (undated) DEVICE — ACE WRAP 4 IN UNSTERILE

## (undated) DEVICE — GLOVE INDICATOR PI UNDERGLOVE SZ 7 BLUE

## (undated) DEVICE — DRAPE C-ARM X-RAY

## (undated) DEVICE — STERILE BETHLEHEM PLASTIC HAND: Brand: CARDINAL HEALTH

## (undated) DEVICE — 3M™ STERI-STRIP™ REINFORCED ADHESIVE SKIN CLOSURES, R1547, 1/2 IN X 4 IN (12 MM X 100 MM), 6 STRIPS/ENVELOPE: Brand: 3M™ STERI-STRIP™

## (undated) DEVICE — INTENDED FOR TISSUE SEPARATION, AND OTHER PROCEDURES THAT REQUIRE A SHARP SURGICAL BLADE TO PUNCTURE OR CUT.: Brand: BARD-PARKER ® CARBON RIB-BACK BLADES

## (undated) DEVICE — U-DRAPE: Brand: CONVERTORS

## (undated) DEVICE — 2.5MM DRILL BIT/QC/GOLD/110MM

## (undated) DEVICE — ADHESIVE SKIN HIGH VISCOSITY EXOFIN 1ML

## (undated) DEVICE — SPONGE LAP 18 X 18 IN STRL RFD

## (undated) DEVICE — PADDING CAST 4 IN  COTTON STRL

## (undated) DEVICE — CUFF TOURNIQUET 18 X 4 IN QUICK CONNECT DISP 1 BLADDER

## (undated) DEVICE — PAD GROUNDING ADULT

## (undated) DEVICE — LIGHT HANDLE COVER SLEEVE DISP BLUE STELLAR

## (undated) DEVICE — NEPTUNE E-SEP SMOKE EVACUATION PENCIL, COATED, 70MM BLADE, PUSH BUTTON SWITCH: Brand: NEPTUNE E-SEP

## (undated) DEVICE — GLOVE INDICATOR PI UNDERGLOVE SZ 8 BLUE

## (undated) DEVICE — ABDOMINAL PAD: Brand: DERMACEA

## (undated) DEVICE — IMPERVIOUS STOCKINETTE: Brand: DEROYAL

## (undated) DEVICE — 2.0MM DRILL BIT WITH DEPTH MARK/QC/140MM

## (undated) DEVICE — SUT MONOCRYL 3-0 PS-2 27 IN Y427H

## (undated) DEVICE — ANTIBACTERIAL VIOLET BRAIDED (POLYGLACTIN 910), SYNTHETIC ABSORBABLE SUTURE: Brand: COATED VICRYL

## (undated) DEVICE — GLOVE SRG BIOGEL 6.5

## (undated) DEVICE — HEAVY DUTY TABLE COVER: Brand: CONVERTORS

## (undated) DEVICE — 2.7MM VA LCKNG SCREW SLF-TPNG WITH T8 STARDRIVE RECESS 12MM
Type: IMPLANTABLE DEVICE | Site: ELBOW | Status: NON-FUNCTIONAL
Removed: 2022-08-24

## (undated) DEVICE — COBAN 4 IN STERILE

## (undated) DEVICE — OCCLUSIVE GAUZE STRIP,3% BISMUTH TRIBROMOPHENATE IN PETROLATUM BLEND: Brand: XEROFORM

## (undated) DEVICE — GLOVE SRG BIOGEL 8

## (undated) DEVICE — CHLORAPREP HI-LITE 26ML ORANGE